# Patient Record
Sex: MALE | Race: WHITE | NOT HISPANIC OR LATINO | Employment: FULL TIME | URBAN - METROPOLITAN AREA
[De-identification: names, ages, dates, MRNs, and addresses within clinical notes are randomized per-mention and may not be internally consistent; named-entity substitution may affect disease eponyms.]

---

## 2017-07-03 RX ORDER — VALSARTAN AND HYDROCHLOROTHIAZIDE 160; 25 MG/1; MG/1
TABLET ORAL
Qty: 90 TABLET | Refills: 2 | Status: SHIPPED | OUTPATIENT
Start: 2017-07-03 | End: 2018-08-11

## 2017-07-26 ENCOUNTER — OFFICE VISIT (OUTPATIENT)
Dept: NEUROSURGERY | Facility: CLINIC | Age: 72
End: 2017-07-26

## 2017-07-26 VITALS
HEART RATE: 94 BPM | HEIGHT: 71 IN | DIASTOLIC BLOOD PRESSURE: 106 MMHG | SYSTOLIC BLOOD PRESSURE: 152 MMHG | WEIGHT: 222.5 LBS | BODY MASS INDEX: 31.15 KG/M2

## 2017-07-26 DIAGNOSIS — R42 DIZZINESS: Primary | ICD-10-CM

## 2017-07-26 DIAGNOSIS — G56.80 PHRENIC NERVE PALSY: ICD-10-CM

## 2017-07-26 PROCEDURE — 99202 OFFICE O/P NEW SF 15 MIN: CPT | Performed by: NEUROLOGICAL SURGERY

## 2017-07-26 NOTE — PROGRESS NOTES
Subjective   Patient ID: Harris Christian is a 71 y.o. male is here today as a self referral for left eye drooping, dizziness and fatigue.    Neurologic Problem   The patient's primary symptoms include focal weakness. This is a new problem. The current episode started more than 1 month ago. The neurological problem developed gradually. The problem has been gradually improving since onset. There was facial and left-sided focality noted. Associated symptoms include dizziness and fatigue. Pertinent negatives include no abdominal pain, auditory change, back pain, bladder incontinence, chest pain, confusion, headaches, light-headedness, neck pain, palpitations, shortness of breath, vertigo or vomiting. Past treatments include nothing.     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  At that time he was found to have an elevated left hemidiaphragm.  The CT scan done at that time reported that the diaphragm was fairly thin and that this had been going on for a long time.  Over the past month or so he has noticed some dizziness and fatigue as well as a little drooping of his left eye.  He has no changes in his vision, no neck pain, no trouble with his arm movement or function, and no pain down his arms.  He has no other symptoms.    The following portions of the patient's history were reviewed and updated as appropriate: allergies, current medications, past family history, past medical history, past social history, past surgical history and problem list.    Review of Systems   Constitutional: Positive for fatigue.   Eyes: Positive for pain ( Drooping left eye).   Respiratory: Negative for chest tightness and shortness of breath.    Cardiovascular: Negative for chest pain and palpitations.   Gastrointestinal: Negative for abdominal pain and vomiting.   Genitourinary: Negative for bladder incontinence.   Musculoskeletal: Negative for back pain and neck pain.   Neurological: Positive for dizziness  and focal weakness. Negative for vertigo, light-headedness and headaches.   Psychiatric/Behavioral: Negative for confusion.   All other systems reviewed and are negative.      Objective   Physical Exam   Constitutional: He is oriented to person, place, and time. He appears well-developed and well-nourished.   HENT:   Head: Normocephalic and atraumatic.   Eyes: Conjunctivae and EOM are normal. Pupils are equal, round, and reactive to light.   Fundoscopic exam:       The right eye shows no papilledema. The right eye shows venous pulsations.        The left eye shows no papilledema. The left eye shows venous pulsations.   Neck: Carotid bruit is not present.   Neurological: He is oriented to person, place, and time. He has a normal Finger-Nose-Finger Test and a normal Heel to Shin Test. Gait normal.   Reflex Scores:       Tricep reflexes are 2+ on the right side and 2+ on the left side.       Bicep reflexes are 2+ on the right side and 2+ on the left side.       Brachioradialis reflexes are 2+ on the right side and 2+ on the left side.       Patellar reflexes are 2+ on the right side and 2+ on the left side.       Achilles reflexes are 2+ on the right side and 2+ on the left side.  Psychiatric: His speech is normal.     Neurologic Exam     Mental Status   Oriented to person, place, and time.   Registration of memory: Good recent and remote memory.   Attention: normal. Concentration: normal.   Speech: speech is normal   Level of consciousness: alert  Knowledge: consistent with education.     Cranial Nerves     CN II   Visual fields full to confrontation.   Visual acuity: normal    CN III, IV, VI   Pupils are equal, round, and reactive to light.  Extraocular motions are normal.     CN V   Facial sensation intact.   Right corneal reflex: normal  Left corneal reflex: normal    CN VII   Facial expression full, symmetric.   Right facial weakness: none  Left facial weakness: none (There is a little drooping of the left eyelid  but no other facial weakness.)    CN VIII   Hearing: intact    CN IX, X   Palate: symmetric    CN XI   Right sternocleidomastoid strength: normal  Left sternocleidomastoid strength: normal    CN XII   Tongue: not atrophic  Tongue deviation: none    Motor Exam   Muscle bulk: normal  Right arm tone: normal  Left arm tone: normal  Right leg tone: normal  Left leg tone: normal    Strength   Strength 5/5 except as noted.     Sensory Exam   Light touch normal.     Gait, Coordination, and Reflexes     Gait  Gait: normal    Coordination   Finger to nose coordination: normal  Heel to shin coordination: normal    Reflexes   Right brachioradialis: 2+  Left brachioradialis: 2+  Right biceps: 2+  Left biceps: 2+  Right triceps: 2+  Left triceps: 2+  Right patellar: 2+  Left patellar: 2+  Right achilles: 2+  Left achilles: 2+  Right : 2+  Left : 2+      Assessment/Plan   Independent Review of Radiographic Studies:      I read the report of the CT scan of his chest which is discussed above.    Medical Decision Making:      I think to be safe we should check an MRI of his brain.  If that looks okay then we might consider either an MRI or a CT of the soft tissues of his neck.  I don't see evidence of a Darby's syndrome.  I don't think we need any contrast for the brain MRI.    Harris was seen today for eye problem.    Diagnoses and all orders for this visit:    Dizziness  -     MRI Brain Without Contrast; Future    Return for Call with results.               mri

## 2017-07-27 PROBLEM — G56.80 PHRENIC NERVE PALSY: Status: ACTIVE | Noted: 2017-07-27

## 2017-07-28 ENCOUNTER — TELEPHONE (OUTPATIENT)
Dept: NEUROSURGERY | Facility: CLINIC | Age: 72
End: 2017-07-28

## 2017-07-28 NOTE — TELEPHONE ENCOUNTER
I talked to this patient today on the phone.  I reviewed his MRI of the cervical soft tissues and his MRI of the brain.  The MRI of the brain shows some tortuosity in the vessels of the brain which I think could account for some mild ptosis.  In addition the MRI of the neck shows a large bone spur on the left side at C3 4 compressing both the C3 and the C4 nerves.  Consequently I think that explains the phrenic nerve paresis.  Because the phrenic nerve paresis has been so long-standing I think it is very unlikely that any surgery would help improve that but I did tell him that I would discuss the films with some other neurosurgeons to see if I get a different opinion.  Otherwise I think he should still keep his appointment with a neurologist just to be sure he doesn't need a spinal tap but I really think that we have a good explanation for his clinical symptoms based on the MRI reports.  I told him to call me if anything else happens.

## 2017-08-01 ENCOUNTER — OFFICE VISIT (OUTPATIENT)
Dept: NEUROLOGY | Facility: CLINIC | Age: 72
End: 2017-08-01

## 2017-08-01 VITALS
SYSTOLIC BLOOD PRESSURE: 148 MMHG | WEIGHT: 231 LBS | HEIGHT: 71 IN | OXYGEN SATURATION: 93 % | HEART RATE: 84 BPM | BODY MASS INDEX: 32.34 KG/M2 | DIASTOLIC BLOOD PRESSURE: 104 MMHG

## 2017-08-01 DIAGNOSIS — H02.402 PTOSIS, LEFT: Primary | ICD-10-CM

## 2017-08-01 PROCEDURE — 99243 OFF/OP CNSLTJ NEW/EST LOW 30: CPT | Performed by: PSYCHIATRY & NEUROLOGY

## 2017-08-01 NOTE — PROGRESS NOTES
Answers for HPI/ROS submitted by the patient on 8/1/2017   Neurologic complaint  altered mental status: No  clumsiness: No  focal sensory loss: No  focal weakness: No  loss of balance: No  memory loss: No  near-syncope: No  slurred speech: No  visual change: No  Chronicity: new  Onset: more than 1 month ago  Onset quality: insidiously  Progression since onset: unchanged  Focality: facial, left-sided  auditory change: No  aura: No  bladder incontinence: No  bowel incontinence: No  vertigo: No  Treatments tried: neck support, position change  Improvement on treatment: moderate      CC: Left ptosis    HPI:  Harris Christian is a  71 y.o.  right-handed white male who was sent for neurologic consultation by Dr. Joshua and Dr. Christian regarding left ptosis.  The patient states that his physician wife had mentioned to him some drooping of the left eyelid.  This has been about one or 2 months ago.  She indicates that it seems to get worse later in the day.  He does not notice it and it does not occlude his vision.  He also states that he has no double vision.  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  This was treated with cervical traction effectively.  He had indicated the right hand was going numb and with cervical traction it stopped going numb.    He does fatigue some gets short of breath some but he is not concerned that it's out of the ordinary.  He denies any speech or swallowing difficulties.  He denies headaches and denies any eye pain.    About a year ago he had thyroid functions which were normal.  He also has had a chest CT which there was no mention of any abnormality in the anterior mediastinum.  MRI of the brain was obtained recently showing some microvascular changes.  There was comment regarding the tip of the basilar and posterior cerebral arteries perhaps may produce some mass effect on the  third nerve.  No tumor and no aneurysm was visible.  Films reviewed.    He does indicate that his hands and feet may go to sleep but that's rare.    There is previous history of a near concussion playing football in high school 1 or 2 times.  No other significant head injuries.  No spine injuries.  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   • Hypertension    • Lower extremity edema    • 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   • Vitamin B 12 deficiency          Past Surgical History:   Procedure Laterality Date   • CARDIAC CATHETERIZATION N/A 6/3/2016    Procedure: Coronary angiography;  Surgeon: Александр Man MD;  Location:  [facility];  Service:    • CARDIAC CATHETERIZATION N/A 6/3/2016    Procedure: Right and Left Heart Cath;  Surgeon: Александр Man MD;  Location:  [facility];  Service:    • CARDIAC CATHETERIZATION N/A 6/3/2016    Procedure: Left ventriculography;  Surgeon: Александр Man MD;  Location:  [facility];  Service:    • NOSE SURGERY             Current Outpatient Prescriptions:   •  aspirin 81 MG EC tablet, Take 81 mg by mouth daily., Disp: , Rfl:   •  Cyanocobalamin 1000 MCG/ML kit, Inject 1,000 mcg as directed every 3 (three) months., Disp: , Rfl:   •  mometasone-formoterol (DULERA 100) 100-5 MCG/ACT inhaler, Inhale 2 puffs 2 (two) times a day., Disp: , Rfl:   •  valsartan (DIOVAN) 160 MG tablet, Take 160 mg by mouth every night., Disp: , Rfl:   •  valsartan-hydrochlorothiazide (DIOVAN-HCT) 160-25 MG per tablet, TAKE 1 TABLET BY MOUTH DAILY, Disp: 90 tablet, Rfl: 2      Family History   Problem Relation Age of Onset   • Heart attack Father    • Cancer Father     • Alcohol abuse Father    • Dementia Father          Social History     Social History   • Marital status:      Spouse name: N/A   • Number of children: N/A   • Years of education: N/A     Occupational History   • Not on file.     Social History Main Topics   • Smoking status: Never Smoker   • Smokeless tobacco: Never Used   • Alcohol use Yes     2 Standard drinks or equivalent per week   • Drug use: No   • Sexual activity: Yes     Partners: Female     Other Topics Concern   • Not on file     Social History Narrative         No Known Allergies      Pain Scale:0/10        ROS:  Review of Systems   Constitutional: Positive for fatigue. Negative for activity change, appetite change, diaphoresis and fever.   HENT: Negative for ear pain, facial swelling and hearing loss.    Eyes: Negative for pain, redness and itching.   Respiratory: Negative for cough, choking and shortness of breath.    Cardiovascular: Negative for chest pain, palpitations and leg swelling.   Gastrointestinal: Negative for abdominal pain, nausea and vomiting.   Endocrine: Negative for cold intolerance and heat intolerance.   Musculoskeletal: Negative for arthralgias, back pain, joint swelling and neck pain.   Skin: Negative for color change, pallor, rash and wound.   Allergic/Immunologic: Negative for environmental allergies and food allergies.   Neurological: Positive for dizziness. Negative for tremors, seizures, syncope, facial asymmetry, speech difficulty, weakness, light-headedness, numbness and headaches.   Hematological: Negative for adenopathy. Does not bruise/bleed easily.   Psychiatric/Behavioral: Negative for agitation, behavioral problems, confusion, decreased concentration, dysphoric mood, hallucinations, self-injury, sleep disturbance and suicidal ideas. The patient is not nervous/anxious and is not hyperactive.            Physical Exam:  Vitals:    08/01/17 1452   BP: (!) 148/104   Pulse: 84   SpO2: 93%   Weight: 231 lb (105  "kg)   Height: 71\" (180.3 cm)     Body mass index is 32.22 kg/(m^2).    Physical Exam  Gen.: Overweight white male no acute distress  HEENT: Normocephalic no evidence of trauma.  Discs flat.  Throat negative.,  Ears: TMs intact  Neck: Supple.  No thyromegaly.  No cervical bruits.  Radial pulses strong and simultaneous.  Heart: Regular rate and rhythm without murmurs         Neurological Exam:   Mental status: Awake alert oriented conversant provides a good history without evidence of an affective disorder thought disorder delusions or hallucinations.  H CF: No aphasia or apraxia or dysarthria.  Recent and remote memory intact.  Knowledge of recent events intact.  Cranial nerves: Visual fields full no left inattention.  Eye movements full no nystagmus.  There is mild left ptosis.  Pupils equal and reactive including in dim light.  Light touch and pinprick normal.  Face symmetric.  Hearing was intact.  Soft palate elevates equally.  Sternomastoid and trapezius are strong.  Tongue midline.  Motor: Normal tone and bulk with full power in all muscles tested in the arms and legs.  Reflexes: Symmetric with downgoing toe signs.  Cerebellar: Finger to nose shows minimal postural tremor.  Rapid movements heel-to-shin normal  Gait and Station: Casual walk toe walk heel walk and tandem walk appear normal for age        Results:      Lab Results   Component Value Date    GLUCOSE 91 05/16/2016    BUN 25 06/15/2016    CREATININE 1.03 06/15/2016    EGFRIFNONA 74 06/15/2016    EGFRIFAFRI 85 06/15/2016    BCR 24 (H) 06/15/2016    CO2 25 06/15/2016    CALCIUM 9.6 06/15/2016    ALBUMIN 4.00 05/16/2016    LABIL2 1.1 05/16/2016    AST 37 05/16/2016    ALT 29 05/16/2016       Lab Results   Component Value Date    WBC 7.08 05/16/2016    HGB 14.6 06/03/2016    HCT 43 06/03/2016    MCV 94.6 05/16/2016     05/16/2016         .No results found for: RPR      Lab Results   Component Value Date    TSH 1.360 05/16/2016    Y6APBEN 7.05 " 05/16/2016         No results found for: UPKOMWEE22      No results found for: FOLATE      No results found for: HGBA1C    MRI brain reviewed as noted above.  MRI of the neck was complex.  There is spurring at C3/4 with left-sided lateral recess stenosis plus similar findings on the left at T1/2        Assessment:   1.  Left ptosis-strongest suspicion is connective tissue breakdown in the lid.  Myasthenia gravis to be ruled out.  I am not impressed with a structural CNS lesion is present to explain it and he does not have a Darby syndrome.  2.  Cervical degenerative disc disease as noted above          Plan:  1.  Myasthenia gravis panel (acetylcholine receptor antibody panel 3 antibodies including blocking, modulating and binding antibodies.  2.  Call for results.  Return when necessary                          Dictated utilizing Dragon dictation.

## 2017-08-03 ENCOUNTER — LAB (OUTPATIENT)
Dept: LAB | Facility: HOSPITAL | Age: 72
End: 2017-08-03

## 2017-08-03 DIAGNOSIS — H02.402 PTOSIS, LEFT: ICD-10-CM

## 2017-08-03 PROCEDURE — 83519 RIA NONANTIBODY: CPT

## 2017-08-03 PROCEDURE — 36415 COLL VENOUS BLD VENIPUNCTURE: CPT

## 2017-08-15 LAB
ACHR AB SER-SCNC: <0.03 NMOL/L (ref 0–0.24)
ACHR BLOCK AB/ACHR TOTAL SFR SER: 20 % (ref 0–25)
ACHR MOD AB/ACHR TOTAL SFR SER: <12 % (ref 0–20)

## 2017-08-30 ENCOUNTER — TELEPHONE (OUTPATIENT)
Dept: NEUROLOGY | Facility: CLINIC | Age: 72
End: 2017-08-30

## 2017-08-30 NOTE — TELEPHONE ENCOUNTER
----- Message from Son Reese MD sent at 8/23/2017  7:24 PM EDT -----  Acetylcholine receptor antibodies were negative for myasthenia gravis.    Jose, please let him know  Gns

## 2018-02-19 ENCOUNTER — DOCUMENTATION (OUTPATIENT)
Dept: SURGERY | Facility: CLINIC | Age: 73
End: 2018-02-19

## 2018-04-03 RX ORDER — VALSARTAN AND HYDROCHLOROTHIAZIDE 160; 25 MG/1; MG/1
TABLET ORAL
Qty: 90 TABLET | Refills: 1 | OUTPATIENT
Start: 2018-04-03

## 2018-04-05 RX ORDER — VALSARTAN AND HYDROCHLOROTHIAZIDE 160; 25 MG/1; MG/1
TABLET ORAL
Qty: 90 TABLET | Refills: 1 | OUTPATIENT
Start: 2018-04-05

## 2018-08-11 RX ORDER — LOSARTAN POTASSIUM 50 MG/1
TABLET ORAL
Qty: 90 TABLET | Refills: 3 | Status: SHIPPED | OUTPATIENT
Start: 2018-08-11 | End: 2019-07-30 | Stop reason: SDUPTHER

## 2018-08-11 RX ORDER — LOSARTAN POTASSIUM AND HYDROCHLOROTHIAZIDE 12.5; 5 MG/1; MG/1
TABLET ORAL
Qty: 90 TABLET | Refills: 3 | Status: SHIPPED | OUTPATIENT
Start: 2018-08-11 | End: 2019-07-30 | Stop reason: SDUPTHER

## 2019-06-01 ENCOUNTER — DOCUMENTATION (OUTPATIENT)
Dept: SURGERY | Facility: CLINIC | Age: 74
End: 2019-06-01

## 2019-06-01 RX ORDER — SULFAMETHOXAZOLE AND TRIMETHOPRIM 800; 160 MG/1; MG/1
1 TABLET ORAL 2 TIMES DAILY
Qty: 30 TABLET | Refills: 0 | Status: SHIPPED | OUTPATIENT
Start: 2019-06-01 | End: 2020-03-11

## 2019-07-26 RX ORDER — LOSARTAN POTASSIUM 50 MG/1
TABLET ORAL
Qty: 90 TABLET | Refills: 2 | OUTPATIENT
Start: 2019-07-26

## 2019-07-26 RX ORDER — LOSARTAN POTASSIUM AND HYDROCHLOROTHIAZIDE 12.5; 5 MG/1; MG/1
TABLET ORAL
Qty: 90 TABLET | Refills: 2 | OUTPATIENT
Start: 2019-07-26

## 2019-07-30 RX ORDER — LOSARTAN POTASSIUM 50 MG/1
TABLET ORAL
Qty: 90 TABLET | Refills: 3 | Status: SHIPPED | OUTPATIENT
Start: 2019-07-30 | End: 2020-01-01

## 2019-07-30 RX ORDER — LOSARTAN POTASSIUM AND HYDROCHLOROTHIAZIDE 12.5; 5 MG/1; MG/1
TABLET ORAL
Qty: 90 TABLET | Refills: 3 | Status: SHIPPED | OUTPATIENT
Start: 2019-07-30 | End: 2020-01-01

## 2020-01-01 ENCOUNTER — TRANSCRIBE ORDERS (OUTPATIENT)
Dept: ADMINISTRATIVE | Facility: HOSPITAL | Age: 75
End: 2020-01-01

## 2020-01-01 ENCOUNTER — HOSPITAL ENCOUNTER (OUTPATIENT)
Dept: MRI IMAGING | Facility: HOSPITAL | Age: 75
Discharge: HOME | End: 2020-07-02
Admitting: SURGERY

## 2020-01-01 ENCOUNTER — TRANSCRIBE ORDERS (OUTPATIENT)
Dept: SLEEP MEDICINE | Facility: HOSPITAL | Age: 75
End: 2020-01-01

## 2020-01-01 ENCOUNTER — LAB (OUTPATIENT)
Dept: OTHER | Facility: HOSPITAL | Age: 75
End: 2020-01-01

## 2020-01-01 ENCOUNTER — HOSPITAL ENCOUNTER (OUTPATIENT)
Dept: PET IMAGING | Facility: HOSPITAL | Age: 75
Discharge: HOME | End: 2020-07-16

## 2020-01-01 ENCOUNTER — OFFICE VISIT (OUTPATIENT)
Dept: ONCOLOGY | Facility: CLINIC | Age: 75
End: 2020-01-01

## 2020-01-01 ENCOUNTER — OFFICE VISIT (OUTPATIENT)
Dept: CARDIOLOGY | Facility: CLINIC | Age: 75
End: 2020-01-01

## 2020-01-01 ENCOUNTER — DOCUMENTATION (OUTPATIENT)
Dept: SURGERY | Facility: CLINIC | Age: 75
End: 2020-01-01

## 2020-01-01 ENCOUNTER — HOSPITAL ENCOUNTER (OUTPATIENT)
Dept: MRI IMAGING | Facility: HOSPITAL | Age: 75
Discharge: HOME | End: 2020-10-15
Admitting: RADIOLOGY

## 2020-01-01 ENCOUNTER — TELEPHONE (OUTPATIENT)
Dept: ONCOLOGY | Facility: CLINIC | Age: 75
End: 2020-01-01

## 2020-01-01 ENCOUNTER — LAB (OUTPATIENT)
Dept: LAB | Facility: HOSPITAL | Age: 75
End: 2020-01-01

## 2020-01-01 ENCOUNTER — DOCUMENTATION (OUTPATIENT)
Dept: ONCOLOGY | Facility: CLINIC | Age: 75
End: 2020-01-01

## 2020-01-01 ENCOUNTER — APPOINTMENT (OUTPATIENT)
Dept: LAB | Facility: HOSPITAL | Age: 75
End: 2020-01-01

## 2020-01-01 ENCOUNTER — PREP FOR SURGERY (OUTPATIENT)
Dept: OTHER | Facility: HOSPITAL | Age: 75
End: 2020-01-01

## 2020-01-01 ENCOUNTER — OFFICE VISIT (OUTPATIENT)
Dept: GASTROENTEROLOGY | Facility: CLINIC | Age: 75
End: 2020-01-01

## 2020-01-01 ENCOUNTER — HOSPITAL ENCOUNTER (OUTPATIENT)
Dept: PET IMAGING | Facility: HOSPITAL | Age: 75
Discharge: HOME | End: 2020-07-16
Admitting: INTERNAL MEDICINE

## 2020-01-01 ENCOUNTER — HOSPITAL ENCOUNTER (OUTPATIENT)
Dept: CT IMAGING | Facility: HOSPITAL | Age: 75
Discharge: HOME | End: 2020-06-26
Admitting: SURGERY

## 2020-01-01 ENCOUNTER — CONSULT (OUTPATIENT)
Dept: ONCOLOGY | Facility: CLINIC | Age: 75
End: 2020-01-01

## 2020-01-01 ENCOUNTER — HOSPITAL ENCOUNTER (OUTPATIENT)
Dept: RESPIRATORY THERAPY | Facility: HOSPITAL | Age: 75
Discharge: HOME | End: 2020-07-13
Admitting: INTERNAL MEDICINE

## 2020-01-01 ENCOUNTER — APPOINTMENT (OUTPATIENT)
Dept: OTHER | Facility: HOSPITAL | Age: 75
End: 2020-01-01

## 2020-01-01 VITALS
BODY MASS INDEX: 28.27 KG/M2 | TEMPERATURE: 97.3 F | HEART RATE: 100 BPM | RESPIRATION RATE: 16 BRPM | DIASTOLIC BLOOD PRESSURE: 82 MMHG | SYSTOLIC BLOOD PRESSURE: 128 MMHG | HEIGHT: 71 IN | WEIGHT: 201.9 LBS | OXYGEN SATURATION: 96 %

## 2020-01-01 VITALS
SYSTOLIC BLOOD PRESSURE: 100 MMHG | BODY MASS INDEX: 28.98 KG/M2 | WEIGHT: 207 LBS | DIASTOLIC BLOOD PRESSURE: 60 MMHG | HEART RATE: 82 BPM | HEIGHT: 71 IN

## 2020-01-01 VITALS
DIASTOLIC BLOOD PRESSURE: 102 MMHG | SYSTOLIC BLOOD PRESSURE: 162 MMHG | WEIGHT: 218.6 LBS | HEIGHT: 71 IN | BODY MASS INDEX: 30.6 KG/M2 | HEART RATE: 91 BPM | OXYGEN SATURATION: 98 %

## 2020-01-01 VITALS
DIASTOLIC BLOOD PRESSURE: 78 MMHG | SYSTOLIC BLOOD PRESSURE: 116 MMHG | WEIGHT: 211.2 LBS | BODY MASS INDEX: 29.57 KG/M2 | OXYGEN SATURATION: 97 % | RESPIRATION RATE: 16 BRPM | HEIGHT: 71 IN | HEART RATE: 85 BPM | TEMPERATURE: 97.4 F

## 2020-01-01 VITALS
HEART RATE: 79 BPM | OXYGEN SATURATION: 97 % | SYSTOLIC BLOOD PRESSURE: 119 MMHG | TEMPERATURE: 97.1 F | HEIGHT: 71 IN | DIASTOLIC BLOOD PRESSURE: 71 MMHG | WEIGHT: 206.4 LBS | BODY MASS INDEX: 28.9 KG/M2 | RESPIRATION RATE: 16 BRPM

## 2020-01-01 DIAGNOSIS — R60.0 BILATERAL LEG EDEMA: ICD-10-CM

## 2020-01-01 DIAGNOSIS — Z01.818 OTHER SPECIFIED PRE-OPERATIVE EXAMINATION: ICD-10-CM

## 2020-01-01 DIAGNOSIS — C22.9 MALIGNANT NEOPLASM OF LIVER, UNSPECIFIED LIVER MALIGNANCY TYPE (HCC): ICD-10-CM

## 2020-01-01 DIAGNOSIS — D51.0 PERNICIOUS ANEMIA: Primary | ICD-10-CM

## 2020-01-01 DIAGNOSIS — C22.9 MALIGNANT NEOPLASM OF LIVER, UNSPECIFIED LIVER MALIGNANCY TYPE (HCC): Primary | ICD-10-CM

## 2020-01-01 DIAGNOSIS — D51.0 PERNICIOUS ANEMIA: ICD-10-CM

## 2020-01-01 DIAGNOSIS — I10 ESSENTIAL HYPERTENSION: Primary | ICD-10-CM

## 2020-01-01 DIAGNOSIS — C22.0 HEPATOMA (HCC): ICD-10-CM

## 2020-01-01 DIAGNOSIS — C22.0 HEPATOCELLULAR CARCINOMA (HCC): ICD-10-CM

## 2020-01-01 DIAGNOSIS — R93.2 ABNORMAL CT OF LIVER: Primary | ICD-10-CM

## 2020-01-01 DIAGNOSIS — Z01.818 OTHER SPECIFIED PRE-OPERATIVE EXAMINATION: Primary | ICD-10-CM

## 2020-01-01 DIAGNOSIS — C22.0 HEPATOCELLULAR CARCINOMA (HCC): Primary | ICD-10-CM

## 2020-01-01 DIAGNOSIS — K74.60 CIRRHOSIS OF LIVER WITHOUT ASCITES, UNSPECIFIED HEPATIC CIRRHOSIS TYPE (HCC): ICD-10-CM

## 2020-01-01 DIAGNOSIS — R93.2 ABNORMAL CT OF LIVER: ICD-10-CM

## 2020-01-01 DIAGNOSIS — K74.60 HEPATIC CIRRHOSIS, UNSPECIFIED HEPATIC CIRRHOSIS TYPE, UNSPECIFIED WHETHER ASCITES PRESENT (HCC): ICD-10-CM

## 2020-01-01 DIAGNOSIS — C22.0 HEPATOMA (HCC): Primary | ICD-10-CM

## 2020-01-01 DIAGNOSIS — J98.6 ELEVATED HEMIDIAPHRAGM: ICD-10-CM

## 2020-01-01 DIAGNOSIS — R16.0 LIVER MASS, RIGHT LOBE: ICD-10-CM

## 2020-01-01 DIAGNOSIS — C22.7 OTHER SPECIFIED CARCINOMAS OF LIVER (HCC): Primary | ICD-10-CM

## 2020-01-01 LAB
ALBUMIN SERPL-MCNC: 3.3 G/DL (ref 3.5–5.2)
ALBUMIN SERPL-MCNC: 3.5 G/DL (ref 3.5–5.2)
ALBUMIN/GLOB SERPL: 1 G/DL
ALBUMIN/GLOB SERPL: 1.1 G/DL (ref 1.1–2.4)
ALP SERPL-CCNC: 163 U/L (ref 38–116)
ALP SERPL-CCNC: 264 U/L (ref 39–117)
ALPHA-FETOPROTEIN: 4.14 NG/ML (ref 0–8.3)
ALPHA-FETOPROTEIN: 4.24 NG/ML (ref 0–8.3)
ALPHA-FETOPROTEIN: 4.83 NG/ML (ref 0–8.3)
ALT SERPL W P-5'-P-CCNC: 41 U/L (ref 1–41)
ALT SERPL W P-5'-P-CCNC: 46 U/L (ref 0–41)
ANION GAP SERPL CALCULATED.3IONS-SCNC: 10.2 MMOL/L (ref 5–15)
ANION GAP SERPL CALCULATED.3IONS-SCNC: 10.9 MMOL/L (ref 5–15)
APTT PPP: 35.6 SECONDS (ref 22.7–35.4)
AST SERPL-CCNC: 75 U/L (ref 0–40)
AST SERPL-CCNC: 76 U/L (ref 1–40)
BASOPHILS # BLD AUTO: 0.02 10*3/MM3 (ref 0–0.2)
BASOPHILS # BLD AUTO: 0.03 10*3/MM3 (ref 0–0.2)
BASOPHILS # BLD AUTO: 0.03 10*3/MM3 (ref 0–0.2)
BASOPHILS NFR BLD AUTO: 0.5 % (ref 0–1.5)
BASOPHILS NFR BLD AUTO: 0.7 % (ref 0–1.5)
BASOPHILS NFR BLD AUTO: 0.7 % (ref 0–1.5)
BDY SITE: NORMAL
BILIRUB SERPL-MCNC: 1.5 MG/DL (ref 0–1.2)
BILIRUB SERPL-MCNC: 1.6 MG/DL (ref 0.2–1.2)
BUN SERPL-MCNC: 17 MG/DL (ref 6–20)
BUN SERPL-MCNC: 19 MG/DL (ref 8–23)
BUN/CREAT SERPL: 19.8 (ref 7.3–30)
BUN/CREAT SERPL: 23.5 (ref 7–25)
CALCIUM SPEC-SCNC: 9.7 MG/DL (ref 8.6–10.5)
CALCIUM SPEC-SCNC: 9.8 MG/DL (ref 8.5–10.2)
CHLORIDE SERPL-SCNC: 101 MMOL/L (ref 98–107)
CHLORIDE SERPL-SCNC: 103 MMOL/L (ref 98–107)
CO2 SERPL-SCNC: 26.1 MMOL/L (ref 22–29)
CO2 SERPL-SCNC: 26.8 MMOL/L (ref 22–29)
CREAT BLDA-MCNC: 0.8 MG/DL (ref 0.6–1.3)
CREAT SERPL-MCNC: 0.81 MG/DL (ref 0.76–1.27)
CREAT SERPL-MCNC: 0.86 MG/DL (ref 0.7–1.3)
DEPRECATED RDW RBC AUTO: 52.9 FL (ref 37–54)
DEPRECATED RDW RBC AUTO: 53.6 FL (ref 37–54)
DEPRECATED RDW RBC AUTO: 55.8 FL (ref 37–54)
EOSINOPHIL # BLD AUTO: 0.06 10*3/MM3 (ref 0–0.4)
EOSINOPHIL # BLD AUTO: 0.08 10*3/MM3 (ref 0–0.4)
EOSINOPHIL # BLD AUTO: 0.12 10*3/MM3 (ref 0–0.4)
EOSINOPHIL NFR BLD AUTO: 1.3 % (ref 0.3–6.2)
EOSINOPHIL NFR BLD AUTO: 1.8 % (ref 0.3–6.2)
EOSINOPHIL NFR BLD AUTO: 2.8 % (ref 0.3–6.2)
ERYTHROCYTE [DISTWIDTH] IN BLOOD BY AUTOMATED COUNT: 13.9 % (ref 12.3–15.4)
ERYTHROCYTE [DISTWIDTH] IN BLOOD BY AUTOMATED COUNT: 14.3 % (ref 12.3–15.4)
ERYTHROCYTE [DISTWIDTH] IN BLOOD BY AUTOMATED COUNT: 14.6 % (ref 12.3–15.4)
FIBRINOGEN PPP-MCNC: 221 MG/DL (ref 219–464)
GFR SERPL CREATININE-BSD FRML MDRD: 87 ML/MIN/1.73
GFR SERPL CREATININE-BSD FRML MDRD: 93 ML/MIN/1.73
GLOBULIN UR ELPH-MCNC: 3.2 GM/DL (ref 1.8–3.5)
GLOBULIN UR ELPH-MCNC: 3.3 GM/DL
GLUCOSE BLDC GLUCOMTR-MCNC: 88 MG/DL (ref 70–130)
GLUCOSE SERPL-MCNC: 126 MG/DL (ref 74–124)
GLUCOSE SERPL-MCNC: 88 MG/DL (ref 65–99)
HCT VFR BLD AUTO: 37.1 % (ref 37.5–51)
HCT VFR BLD AUTO: 41.1 % (ref 37.5–51)
HCT VFR BLD AUTO: 42.1 % (ref 37.5–51)
HGB BLD-MCNC: 12.5 G/DL (ref 13–17.7)
HGB BLD-MCNC: 13.8 G/DL (ref 13–17.7)
HGB BLD-MCNC: 13.9 G/DL (ref 13–17.7)
HGB BLDA-MCNC: 14 G/DL (ref 12–18)
IMM GRANULOCYTES # BLD AUTO: 0.01 10*3/MM3 (ref 0–0.05)
IMM GRANULOCYTES # BLD AUTO: 0.01 10*3/MM3 (ref 0–0.05)
IMM GRANULOCYTES # BLD AUTO: 0.02 10*3/MM3 (ref 0–0.05)
IMM GRANULOCYTES NFR BLD AUTO: 0.2 % (ref 0–0.5)
IMM GRANULOCYTES NFR BLD AUTO: 0.2 % (ref 0–0.5)
IMM GRANULOCYTES NFR BLD AUTO: 0.5 % (ref 0–0.5)
INR PPP: 1.09 (ref 0.9–1.1)
LYMPHOCYTES # BLD AUTO: 1.18 10*3/MM3 (ref 0.7–3.1)
LYMPHOCYTES # BLD AUTO: 1.42 10*3/MM3 (ref 0.7–3.1)
LYMPHOCYTES # BLD AUTO: 1.68 10*3/MM3 (ref 0.7–3.1)
LYMPHOCYTES NFR BLD AUTO: 26.9 % (ref 19.6–45.3)
LYMPHOCYTES NFR BLD AUTO: 33.3 % (ref 19.6–45.3)
LYMPHOCYTES NFR BLD AUTO: 37.8 % (ref 19.6–45.3)
MACROCYTES BLD QL SMEAR: NORMAL
MCH RBC QN AUTO: 33.7 PG (ref 26.6–33)
MCH RBC QN AUTO: 33.9 PG (ref 26.6–33)
MCH RBC QN AUTO: 34.9 PG (ref 26.6–33)
MCHC RBC AUTO-ENTMCNC: 33 G/DL (ref 31.5–35.7)
MCHC RBC AUTO-ENTMCNC: 33.6 G/DL (ref 31.5–35.7)
MCHC RBC AUTO-ENTMCNC: 33.7 G/DL (ref 31.5–35.7)
MCV RBC AUTO: 100.5 FL (ref 79–97)
MCV RBC AUTO: 102.7 FL (ref 79–97)
MCV RBC AUTO: 103.6 FL (ref 79–97)
MONOCYTES # BLD AUTO: 0.66 10*3/MM3 (ref 0.1–0.9)
MONOCYTES # BLD AUTO: 0.75 10*3/MM3 (ref 0.1–0.9)
MONOCYTES # BLD AUTO: 0.79 10*3/MM3 (ref 0.1–0.9)
MONOCYTES NFR BLD AUTO: 14.8 % (ref 5–12)
MONOCYTES NFR BLD AUTO: 17.6 % (ref 5–12)
MONOCYTES NFR BLD AUTO: 18 % (ref 5–12)
NEUTROPHILS NFR BLD AUTO: 1.93 10*3/MM3 (ref 1.7–7)
NEUTROPHILS NFR BLD AUTO: 2.01 10*3/MM3 (ref 1.7–7)
NEUTROPHILS NFR BLD AUTO: 2.29 10*3/MM3 (ref 1.7–7)
NEUTROPHILS NFR BLD AUTO: 45.2 % (ref 42.7–76)
NEUTROPHILS NFR BLD AUTO: 45.4 % (ref 42.7–76)
NEUTROPHILS NFR BLD AUTO: 52.3 % (ref 42.7–76)
NRBC BLD AUTO-RTO: 0 /100 WBC (ref 0–0.2)
PLAT MORPH BLD: NORMAL
PLATELET # BLD AUTO: 109 10*3/MM3 (ref 140–450)
PLATELET # BLD AUTO: 122 10*3/MM3 (ref 140–450)
PLATELET # BLD AUTO: 126 10*3/MM3 (ref 140–450)
PMV BLD AUTO: 10.2 FL (ref 6–12)
PMV BLD AUTO: 10.5 FL (ref 6–12)
PMV BLD AUTO: 10.6 FL (ref 6–12)
POTASSIUM SERPL-SCNC: 3.9 MMOL/L (ref 3.5–4.7)
POTASSIUM SERPL-SCNC: 4 MMOL/L (ref 3.5–5.2)
PROT SERPL-MCNC: 6.6 G/DL (ref 6–8.5)
PROT SERPL-MCNC: 6.7 G/DL (ref 6.3–8)
PROTHROMBIN TIME: 14 SECONDS (ref 11.7–14.2)
RBC # BLD AUTO: 3.58 10*6/MM3 (ref 4.14–5.8)
RBC # BLD AUTO: 4.09 10*6/MM3 (ref 4.14–5.8)
RBC # BLD AUTO: 4.1 10*6/MM3 (ref 4.14–5.8)
REF LAB TEST METHOD: NORMAL
REF LAB TEST METHOD: NORMAL
SARS-COV-2 RNA RESP QL NAA+PROBE: NOT DETECTED
SODIUM SERPL-SCNC: 138 MMOL/L (ref 136–145)
SODIUM SERPL-SCNC: 140 MMOL/L (ref 134–145)
T3FREE SERPL-MCNC: 3.26 PG/ML (ref 2–4.4)
T4 FREE SERPL-MCNC: 1.07 NG/DL (ref 0.93–1.7)
TSH SERPL DL<=0.05 MIU/L-ACNC: 1.34 UIU/ML (ref 0.27–4.2)
WBC # BLD AUTO: 4.26 10*3/MM3 (ref 3.4–10.8)
WBC # BLD AUTO: 4.38 10*3/MM3 (ref 3.4–10.8)
WBC # BLD AUTO: 4.45 10*3/MM3 (ref 3.4–10.8)
WBC MORPH BLD: NORMAL

## 2020-01-01 PROCEDURE — 36415 COLL VENOUS BLD VENIPUNCTURE: CPT

## 2020-01-01 PROCEDURE — 82105 ALPHA-FETOPROTEIN SERUM: CPT | Performed by: INTERNAL MEDICINE

## 2020-01-01 PROCEDURE — 74183 MRI ABD W/O CNTR FLWD CNTR: CPT

## 2020-01-01 PROCEDURE — 99213 OFFICE O/P EST LOW 20 MIN: CPT | Performed by: INTERNAL MEDICINE

## 2020-01-01 PROCEDURE — 94729 DIFFUSING CAPACITY: CPT

## 2020-01-01 PROCEDURE — U0004 COV-19 TEST NON-CDC HGH THRU: HCPCS

## 2020-01-01 PROCEDURE — A9577 INJ MULTIHANCE: HCPCS | Performed by: RADIOLOGY

## 2020-01-01 PROCEDURE — 84481 FREE ASSAY (FT-3): CPT

## 2020-01-01 PROCEDURE — 93000 ELECTROCARDIOGRAM COMPLETE: CPT | Performed by: INTERNAL MEDICINE

## 2020-01-01 PROCEDURE — 80053 COMPREHEN METABOLIC PANEL: CPT

## 2020-01-01 PROCEDURE — 99442 PR PHYS/QHP TELEPHONE EVALUATION 11-20 MIN: CPT | Performed by: INTERNAL MEDICINE

## 2020-01-01 PROCEDURE — 82565 ASSAY OF CREATININE: CPT

## 2020-01-01 PROCEDURE — 0 FLUDEOXYGLUCOSE F18 SOLUTION: Performed by: INTERNAL MEDICINE

## 2020-01-01 PROCEDURE — 94726 PLETHYSMOGRAPHY LUNG VOLUMES: CPT

## 2020-01-01 PROCEDURE — 99245 OFF/OP CONSLTJ NEW/EST HI 55: CPT | Performed by: INTERNAL MEDICINE

## 2020-01-01 PROCEDURE — 36415 COLL VENOUS BLD VENIPUNCTURE: CPT | Performed by: INTERNAL MEDICINE

## 2020-01-01 PROCEDURE — A9552 F18 FDG: HCPCS | Performed by: INTERNAL MEDICINE

## 2020-01-01 PROCEDURE — 85007 BL SMEAR W/DIFF WBC COUNT: CPT | Performed by: INTERNAL MEDICINE

## 2020-01-01 PROCEDURE — 94010 BREATHING CAPACITY TEST: CPT

## 2020-01-01 PROCEDURE — 82962 GLUCOSE BLOOD TEST: CPT

## 2020-01-01 PROCEDURE — 99214 OFFICE O/P EST MOD 30 MIN: CPT | Performed by: INTERNAL MEDICINE

## 2020-01-01 PROCEDURE — 85025 COMPLETE CBC W/AUTO DIFF WBC: CPT

## 2020-01-01 PROCEDURE — C9803 HOPD COVID-19 SPEC COLLECT: HCPCS

## 2020-01-01 PROCEDURE — 85730 THROMBOPLASTIN TIME PARTIAL: CPT | Performed by: INTERNAL MEDICINE

## 2020-01-01 PROCEDURE — 85384 FIBRINOGEN ACTIVITY: CPT | Performed by: INTERNAL MEDICINE

## 2020-01-01 PROCEDURE — A9577 INJ MULTIHANCE: HCPCS | Performed by: SURGERY

## 2020-01-01 PROCEDURE — 25010000002 IOPAMIDOL 61 % SOLUTION: Performed by: SURGERY

## 2020-01-01 PROCEDURE — 80053 COMPREHEN METABOLIC PANEL: CPT | Performed by: INTERNAL MEDICINE

## 2020-01-01 PROCEDURE — 78815 PET IMAGE W/CT SKULL-THIGH: CPT

## 2020-01-01 PROCEDURE — 84443 ASSAY THYROID STIM HORMONE: CPT

## 2020-01-01 PROCEDURE — 99203 OFFICE O/P NEW LOW 30 MIN: CPT | Performed by: INTERNAL MEDICINE

## 2020-01-01 PROCEDURE — 82820 HEMOGLOBIN-OXYGEN AFFINITY: CPT | Performed by: INTERNAL MEDICINE

## 2020-01-01 PROCEDURE — 0 GADOBENATE DIMEGLUMINE 529 MG/ML SOLUTION: Performed by: RADIOLOGY

## 2020-01-01 PROCEDURE — 85610 PROTHROMBIN TIME: CPT | Performed by: INTERNAL MEDICINE

## 2020-01-01 PROCEDURE — 85025 COMPLETE CBC W/AUTO DIFF WBC: CPT | Performed by: INTERNAL MEDICINE

## 2020-01-01 PROCEDURE — 0 GADOBENATE DIMEGLUMINE 529 MG/ML SOLUTION: Performed by: SURGERY

## 2020-01-01 PROCEDURE — 84439 ASSAY OF FREE THYROXINE: CPT

## 2020-01-01 PROCEDURE — 74178 CT ABD&PLV WO CNTR FLWD CNTR: CPT

## 2020-01-01 RX ORDER — BENAZEPRIL HYDROCHLORIDE 20 MG/1
20 TABLET ORAL 2 TIMES DAILY
Qty: 180 TABLET | Refills: 1 | Status: ON HOLD | OUTPATIENT
Start: 2020-01-01 | End: 2021-01-01

## 2020-01-01 RX ORDER — OXYCODONE HCL 10 MG/1
10 TABLET, FILM COATED, EXTENDED RELEASE ORAL ONCE
Status: CANCELLED | OUTPATIENT
Start: 2020-01-01 | End: 2020-01-01

## 2020-01-01 RX ORDER — SPIRONOLACTONE 25 MG/1
25 TABLET ORAL DAILY
Qty: 30 TABLET | Refills: 1 | Status: SHIPPED | OUTPATIENT
Start: 2020-01-01 | End: 2020-01-01 | Stop reason: SDUPTHER

## 2020-01-01 RX ORDER — CEFAZOLIN SODIUM 2 G/100ML
2 INJECTION, SOLUTION INTRAVENOUS ONCE
Status: CANCELLED | OUTPATIENT
Start: 2020-01-01 | End: 2020-01-01

## 2020-01-01 RX ORDER — SPIRONOLACTONE 25 MG/1
25 TABLET ORAL DAILY
Qty: 90 TABLET | Refills: 1 | Status: SHIPPED | OUTPATIENT
Start: 2020-01-01 | End: 2021-01-01

## 2020-01-01 RX ORDER — DOXYCYCLINE HYCLATE 20 MG
TABLET ORAL
COMMUNITY
Start: 2020-01-01 | End: 2021-01-01

## 2020-01-01 RX ORDER — SODIUM CHLORIDE 0.9 % (FLUSH) 0.9 %
3 SYRINGE (ML) INJECTION EVERY 12 HOURS SCHEDULED
Status: CANCELLED | OUTPATIENT
Start: 2020-01-01

## 2020-01-01 RX ORDER — SODIUM CHLORIDE 0.9 % (FLUSH) 0.9 %
1-10 SYRINGE (ML) INJECTION AS NEEDED
Status: CANCELLED | OUTPATIENT
Start: 2020-01-01

## 2020-01-01 RX ORDER — BENAZEPRIL HYDROCHLORIDE 20 MG/1
20 TABLET ORAL 2 TIMES DAILY
Qty: 60 TABLET | Refills: 1 | Status: SHIPPED | OUTPATIENT
Start: 2020-01-01 | End: 2020-01-01 | Stop reason: SDUPTHER

## 2020-01-01 RX ADMIN — GADOBENATE DIMEGLUMINE 20 ML: 529 INJECTION, SOLUTION INTRAVENOUS at 10:46

## 2020-01-01 RX ADMIN — FLUDEOXYGLUCOSE F18 1 DOSE: 300 INJECTION INTRAVENOUS at 07:25

## 2020-01-01 RX ADMIN — GADOBENATE DIMEGLUMINE 20 ML: 529 INJECTION, SOLUTION INTRAVENOUS at 17:38

## 2020-01-01 RX ADMIN — IOPAMIDOL 85 ML: 612 INJECTION, SOLUTION INTRAVENOUS at 08:32

## 2020-01-19 DIAGNOSIS — J98.6 ELEVATED HEMIDIAPHRAGM: Primary | ICD-10-CM

## 2020-02-11 DIAGNOSIS — D51.0 PERNICIOUS ANEMIA: Primary | ICD-10-CM

## 2020-02-11 DIAGNOSIS — J98.6 ELEVATED HEMIDIAPHRAGM: ICD-10-CM

## 2020-02-28 ENCOUNTER — LAB (OUTPATIENT)
Dept: LAB | Facility: HOSPITAL | Age: 75
End: 2020-02-28

## 2020-02-28 ENCOUNTER — HOSPITAL ENCOUNTER (OUTPATIENT)
Dept: GENERAL RADIOLOGY | Facility: HOSPITAL | Age: 75
Discharge: HOME | End: 2020-02-28
Admitting: SURGERY

## 2020-02-28 ENCOUNTER — HOSPITAL ENCOUNTER (OUTPATIENT)
Dept: RESPIRATORY THERAPY | Facility: HOSPITAL | Age: 75
Discharge: HOME | End: 2020-02-28

## 2020-02-28 DIAGNOSIS — J98.6 ELEVATED HEMIDIAPHRAGM: ICD-10-CM

## 2020-02-28 DIAGNOSIS — D51.0 PERNICIOUS ANEMIA: ICD-10-CM

## 2020-02-28 LAB
ALBUMIN SERPL-MCNC: 3.5 G/DL (ref 3.5–5.2)
ALBUMIN/GLOB SERPL: 1.2 G/DL
ALP SERPL-CCNC: 187 U/L (ref 39–117)
ALT SERPL W P-5'-P-CCNC: 46 U/L (ref 1–41)
ANION GAP SERPL CALCULATED.3IONS-SCNC: 11.2 MMOL/L (ref 5–15)
ARTERIAL PATENCY WRIST A: POSITIVE
AST SERPL-CCNC: 69 U/L (ref 1–40)
ATMOSPHERIC PRESS: 755.3 MMHG
BASE EXCESS BLDA CALC-SCNC: 7.1 MMOL/L (ref 0–2)
BASOPHILS # BLD AUTO: 0.05 10*3/MM3 (ref 0–0.2)
BASOPHILS NFR BLD AUTO: 1 % (ref 0–1.5)
BDY SITE: ABNORMAL
BILIRUB SERPL-MCNC: 1.8 MG/DL (ref 0.2–1.2)
BUN BLD-MCNC: 13 MG/DL (ref 8–23)
BUN/CREAT SERPL: 14 (ref 7–25)
CALCIUM SPEC-SCNC: 9.5 MG/DL (ref 8.6–10.5)
CHLORIDE SERPL-SCNC: 99 MMOL/L (ref 98–107)
CHOLEST SERPL-MCNC: 186 MG/DL (ref 0–200)
CO2 SERPL-SCNC: 30.8 MMOL/L (ref 22–29)
CREAT BLD-MCNC: 0.93 MG/DL (ref 0.76–1.27)
DEPRECATED RDW RBC AUTO: 45.4 FL (ref 37–54)
EOSINOPHIL # BLD AUTO: 0.12 10*3/MM3 (ref 0–0.4)
EOSINOPHIL NFR BLD AUTO: 2.5 % (ref 0.3–6.2)
ERYTHROCYTE [DISTWIDTH] IN BLOOD BY AUTOMATED COUNT: 12.2 % (ref 12.3–15.4)
FOLATE SERPL-MCNC: >20 NG/ML (ref 4.78–24.2)
GFR SERPL CREATININE-BSD FRML MDRD: 80 ML/MIN/1.73
GLOBULIN UR ELPH-MCNC: 3 GM/DL
GLUCOSE BLD-MCNC: 96 MG/DL (ref 65–99)
HCO3 BLDA-SCNC: 32.7 MMOL/L (ref 22–28)
HCT VFR BLD AUTO: 44.3 % (ref 37.5–51)
HDLC SERPL-MCNC: 75 MG/DL (ref 40–60)
HGB BLD-MCNC: 14.7 G/DL (ref 13–17.7)
IMM GRANULOCYTES # BLD AUTO: 0.01 10*3/MM3 (ref 0–0.05)
IMM GRANULOCYTES NFR BLD AUTO: 0.2 % (ref 0–0.5)
LDLC SERPL CALC-MCNC: 97 MG/DL (ref 0–100)
LDLC/HDLC SERPL: 1.29 {RATIO}
LYMPHOCYTES # BLD AUTO: 1.64 10*3/MM3 (ref 0.7–3.1)
LYMPHOCYTES NFR BLD AUTO: 34.2 % (ref 19.6–45.3)
MCH RBC QN AUTO: 33 PG (ref 26.6–33)
MCHC RBC AUTO-ENTMCNC: 33.2 G/DL (ref 31.5–35.7)
MCV RBC AUTO: 99.6 FL (ref 79–97)
MODALITY: ABNORMAL
MONOCYTES # BLD AUTO: 0.69 10*3/MM3 (ref 0.1–0.9)
MONOCYTES NFR BLD AUTO: 14.4 % (ref 5–12)
NEUTROPHILS # BLD AUTO: 2.28 10*3/MM3 (ref 1.7–7)
NEUTROPHILS NFR BLD AUTO: 47.7 % (ref 42.7–76)
NRBC BLD AUTO-RTO: 0 /100 WBC (ref 0–0.2)
PCO2 BLDA: 47.4 MM HG (ref 35–45)
PH BLDA: 7.45 PH UNITS (ref 7.35–7.45)
PLATELET # BLD AUTO: 139 10*3/MM3 (ref 140–450)
PMV BLD AUTO: 10.5 FL (ref 6–12)
PO2 BLDA: 65.1 MM HG (ref 80–100)
POTASSIUM BLD-SCNC: 4 MMOL/L (ref 3.5–5.2)
PROT SERPL-MCNC: 6.5 G/DL (ref 6–8.5)
RBC # BLD AUTO: 4.45 10*6/MM3 (ref 4.14–5.8)
SAO2 % BLDCOA: 93 % (ref 92–99)
SODIUM BLD-SCNC: 141 MMOL/L (ref 136–145)
TOTAL RATE: 18 BREATHS/MINUTE
TRIGL SERPL-MCNC: 70 MG/DL (ref 0–150)
VIT B12 BLD-MCNC: 473 PG/ML (ref 211–946)
VLDLC SERPL-MCNC: 14 MG/DL (ref 5–40)
WBC NRBC COR # BLD: 4.79 10*3/MM3 (ref 3.4–10.8)

## 2020-02-28 PROCEDURE — 82607 VITAMIN B-12: CPT

## 2020-02-28 PROCEDURE — 80053 COMPREHEN METABOLIC PANEL: CPT

## 2020-02-28 PROCEDURE — 82746 ASSAY OF FOLIC ACID SERUM: CPT

## 2020-02-28 PROCEDURE — 82803 BLOOD GASES ANY COMBINATION: CPT | Performed by: INTERNAL MEDICINE

## 2020-02-28 PROCEDURE — 71046 X-RAY EXAM CHEST 2 VIEWS: CPT

## 2020-02-28 PROCEDURE — 80061 LIPID PANEL: CPT

## 2020-02-28 PROCEDURE — 36600 WITHDRAWAL OF ARTERIAL BLOOD: CPT | Performed by: INTERNAL MEDICINE

## 2020-02-28 PROCEDURE — 36415 COLL VENOUS BLD VENIPUNCTURE: CPT

## 2020-02-28 PROCEDURE — 85025 COMPLETE CBC W/AUTO DIFF WBC: CPT

## 2020-03-02 ENCOUNTER — OFFICE VISIT (OUTPATIENT)
Dept: GASTROENTEROLOGY | Facility: CLINIC | Age: 75
End: 2020-03-02

## 2020-03-02 VITALS
WEIGHT: 223.5 LBS | SYSTOLIC BLOOD PRESSURE: 130 MMHG | RESPIRATION RATE: 16 BRPM | OXYGEN SATURATION: 92 % | HEIGHT: 71 IN | HEART RATE: 90 BPM | TEMPERATURE: 98.7 F | BODY MASS INDEX: 31.29 KG/M2 | DIASTOLIC BLOOD PRESSURE: 90 MMHG

## 2020-03-02 DIAGNOSIS — Z12.11 ENCOUNTER FOR SCREENING FOR MALIGNANT NEOPLASM OF COLON: ICD-10-CM

## 2020-03-02 DIAGNOSIS — J98.6 ELEVATED HEMIDIAPHRAGM: ICD-10-CM

## 2020-03-02 DIAGNOSIS — G56.80 PHRENIC NERVE PALSY: ICD-10-CM

## 2020-03-02 DIAGNOSIS — R79.89 ELEVATED LIVER FUNCTION TESTS: Primary | ICD-10-CM

## 2020-03-02 DIAGNOSIS — E53.8 COBALAMIN DEFICIENCY: ICD-10-CM

## 2020-03-02 PROCEDURE — 99204 OFFICE O/P NEW MOD 45 MIN: CPT | Performed by: INTERNAL MEDICINE

## 2020-03-02 NOTE — PROGRESS NOTES
Elevated Hepatic Enzymes      HPI  74 year old male presents today for consult regarding abdnormal blood work.     Symptoms first started with shortness of breath walking across the parking lot.     He has had recent blood work with elevated LFT.    He has regular bowel movements, denies rectal bleeding, melena.     Denies heartburn, reflux, nausea or vomiting.     He reports previous cardiac cath that was normal. He has never been diagnosed with heart failure.     Denies yellowing of the skin, history of hepatitis or family history of liver disease.     No more that two drinks per day, averages four drinks per week.     Denies lower extremity swelling or edema.     He has had previous liver biopsy and was told this was normal. Biopsy was obtained due to abnormal liver test.     Denies tobacco use.     No previous colonoscopy or EGD. No family history of colon cancer or polyps.     He has a diagnosis of pernicious anemia. He takes B12 shots.     LIVER BIOPSY RESULTS 9/16/2016 reviewed with patchy mild portal chronic inflammation. No significant steatosis or lobular inflammation. Mild periportal fibrosis without definite architectural distortion, iron stain is negative.       Review of Systems   Constitutional: Negative.    HENT: Negative.    Eyes: Negative.    Respiratory: Negative.    Cardiovascular: Negative.    Endocrine: Negative.    Genitourinary: Negative.    Musculoskeletal: Negative.    Skin: Negative.    Allergic/Immunologic: Negative.    Neurological: Negative.    Hematological: Negative.    Psychiatric/Behavioral: Negative.         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/2016   • Hypertension    • Lower extremity edema    • 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   • Vitamin B 12 deficiency         Social History:    Social History     Socioeconomic History   • Marital status:      Spouse name: Not on file   • Number of children: Not on file   • Years of education: Not on file   • Highest education level: Not on file   Tobacco Use   • Smoking status: Never Smoker   • Smokeless tobacco: Never Used   Substance and Sexual Activity   • Alcohol use: Yes     Alcohol/week: 6.0 standard drinks     Types: 2 Standard drinks or equivalent, 4 Shots of liquor per week   • Drug use: No   • Sexual activity: Yes     Partners: Female       Family History:   Family History   Problem Relation Age of Onset   • Heart attack Father    • Cancer Father    • Alcohol abuse Father    • Dementia Father    • Colon cancer Neg Hx    • Colon polyps Neg Hx        Surgical History:   Past Surgical History:   Procedure Laterality Date   • CARDIAC CATHETERIZATION N/A 6/3/2016    Procedure: Coronary angiography;  Surgeon: Александр Man MD;  Location: [facility] INV[facility] LOC[facility];  Service:    • CARDIAC CATHETERIZATION N/A 6/3/2016    Procedure: Right and Left Heart Cath;  Surgeon: Александр Man MD;  Location: [facility]ATH [facility]VAS[facility] LOCATION;  Service:    • CARDIAC CATHETERIZATION N/A 6/3/2016    Procedure: Left ventriculography;  Surgeon: Александр Man MD;  Location: [facility] CATH INVASIVE LOCATION;  Service:    • NOSE SURGERY           Current Outpatient Medications:   •  aspirin 81 MG EC tablet, Take 81 mg by mouth daily., Disp: , Rfl:   •  Cyanocobalamin 1000 MCG/ML kit, Inject 1,000 mcg as directed every 3 (three) months., Disp: , Rfl:   •  losartan (COZAAR) 50 MG tablet, Take 1 tablet every evening, Disp:  90 tablet, Rfl: 3  •  losartan-hydrochlorothiazide (HYZAAR) 50-12.5 MG per tablet, Take 1 tablet every morning, Disp: 90 tablet, Rfl: 3  •  sulfamethoxazole-trimethoprim (BACTRIM DS) 800-160 MG per tablet, Take 1 tablet by mouth 2 (Two) Times a Day., Disp: 30 tablet, Rfl: 0  •  mometasone-formoterol (DULERA 100) 100-5 MCG/ACT inhaler, Inhale 2 puffs 2 (two) times a day., Disp: , Rfl:     Allergies: No Known Allergies     The following portions of the patient's history were reviewed and updated as appropriate: allergies, current medications, past family history, past medical history, past social history, past surgical history and problem list.    Vitals:    03/02/20 1155   BP: 130/90   Pulse: 90   Resp: 16   Temp: 98.7 °F (37.1 °C)   SpO2: 92%         03/02/20  1155   Weight: 101 kg (223 lb 8 oz)     Body mass index is 31.17 kg/m².      PHYSICAL EXAM:  Physical Exam   HENT:   Nose: No nasal deformity.   Eyes: No scleral icterus.   Neck:   Trachea midline.   Cardiovascular: Normal rate and regular rhythm.   Pulmonary/Chest: Breath sounds normal. No respiratory distress.   Abdominal: Soft. Normal appearance and bowel sounds are normal. He exhibits no distension and no mass. There is no tenderness.   Musculoskeletal: He exhibits edema.   Bilateral 1 plus pitting edema   Lymphadenopathy:   No periumbilical lymphadenopathy.   Neurological: He is alert.   Skin: Skin is warm. No cyanosis.   Psychiatric: He has a normal mood and affect.   Vitals reviewed.        Assessment/ Plan  Harris was seen today for elevated hepatic enzymes.    Diagnoses and all orders for this visit:    Elevated liver function tests  -     CT Abdomen Liver With & Without Contrast; Future  -     Alpha - 1 - Antitrypsin  -     ALBIN  -     Anti-microsomal Antibody  -     Anti-Smooth Muscle Antibody Titer  -     CBC & Differential  -     Comprehensive Metabolic Panel  -     Ferritin  -     Celiac Disease Panel  -     Ceruloplasmin  -     Gamma GT  -      Hepatitis Panel, Acute  -     IgG, IgA, IgM  -     Iron  -     Iron Profile  -     Mitochondrial Antibodies, M2    Encounter for screening for malignant neoplasm of colon    Cobalamin deficiency    Phrenic nerve palsy    Elevated hemidiaphragm         Return for after planned testing.      Discussion:  Reviewed previous liver biopsy and recent blood work with elevated liver function tests.  Recommend liver serologies for further evaluation, orders placed.     Colon cancer screening options discussed including cologuard stool test. Given risk for sedation with colonoscopy if test would be positive, to discuss his preference for screening options again in the future.     Plans to include triple phase CT of the liver to assess for signs of cirrhosis.  CT chosen over MR due to patient self report of possible claustrophobia.  Also to order ultrasound with Doppler flow to ensure vasculature into and out of the liver is normal without congestion or thrombosis.  Also full work-up for causes of elevated LFTs given previous biopsy with portal inflammation simultaneous autoimmune disease of pernicious anemia and lack of fatty cells on previous liver biopsy.  Repeat liver biopsy is not excluded but not necessary as first test.    Documentation was completed by Edyta VARGAS acting as a scribe in the following sections (HPI, Assessment, Plan) on behalf of the billing provider. Edenilson    Note: Review and summarization of old patient records in this note have been personally reviewed by me  And   Refer to After Visit Summary for details of patient counseling, education and instructions provided during the encounter

## 2020-03-04 LAB
A1AT SERPL-MCNC: 164 MG/DL (ref 101–187)
ACTIN IGG SERPL-ACNC: 17 UNITS (ref 0–19)
ALBUMIN SERPL-MCNC: 3.9 G/DL (ref 3.7–4.7)
ALBUMIN/GLOB SERPL: 1.4 {RATIO} (ref 1.2–2.2)
ALP SERPL-CCNC: 202 IU/L (ref 39–117)
ALT SERPL-CCNC: 51 IU/L (ref 0–44)
ANA SER QL: NEGATIVE
AST SERPL-CCNC: 78 IU/L (ref 0–40)
BASOPHILS # BLD AUTO: 0 X10E3/UL (ref 0–0.2)
BASOPHILS NFR BLD AUTO: 1 %
BILIRUB SERPL-MCNC: 1.6 MG/DL (ref 0–1.2)
BUN SERPL-MCNC: 15 MG/DL (ref 8–27)
BUN/CREAT SERPL: 17 (ref 10–24)
CALCIUM SERPL-MCNC: 9.8 MG/DL (ref 8.6–10.2)
CERULOPLASMIN SERPL-MCNC: 28.9 MG/DL (ref 16–31)
CHLORIDE SERPL-SCNC: 100 MMOL/L (ref 96–106)
CO2 SERPL-SCNC: 28 MMOL/L (ref 20–29)
CREAT SERPL-MCNC: 0.9 MG/DL (ref 0.76–1.27)
ENDOMYSIUM IGA SER QL: NEGATIVE
EOSINOPHIL # BLD AUTO: 0.1 X10E3/UL (ref 0–0.4)
EOSINOPHIL NFR BLD AUTO: 2 %
ERYTHROCYTE [DISTWIDTH] IN BLOOD BY AUTOMATED COUNT: 11.9 % (ref 11.6–15.4)
FERRITIN SERPL-MCNC: 151 NG/ML (ref 30–400)
GGT SERPL-CCNC: 281 IU/L (ref 0–65)
GLOBULIN SER CALC-MCNC: 2.7 G/DL (ref 1.5–4.5)
GLUCOSE SERPL-MCNC: 90 MG/DL (ref 65–99)
HAV IGM SERPL QL IA: NEGATIVE
HBV CORE IGM SERPL QL IA: NEGATIVE
HBV SURFACE AG SERPL QL IA: NEGATIVE
HCT VFR BLD AUTO: 46.1 % (ref 37.5–51)
HCV AB S/CO SERPL IA: 0.1 S/CO RATIO (ref 0–0.9)
HGB BLD-MCNC: 15 G/DL (ref 13–17.7)
IGA SERPL-MCNC: 348 MG/DL (ref 61–437)
IGG SERPL-MCNC: 1416 MG/DL (ref 700–1600)
IGM SERPL-MCNC: 195 MG/DL (ref 15–143)
IMM GRANULOCYTES # BLD AUTO: 0 X10E3/UL (ref 0–0.1)
IMM GRANULOCYTES NFR BLD AUTO: 0 %
IRON SATN MFR SERPL: 50 % (ref 15–55)
IRON SERPL-MCNC: 135 UG/DL (ref 38–169)
LKM-1 AB SER-ACNC: 1.2 UNITS (ref 0–20)
LYMPHOCYTES # BLD AUTO: 1.7 X10E3/UL (ref 0.7–3.1)
LYMPHOCYTES NFR BLD AUTO: 34 %
MCH RBC QN AUTO: 32.4 PG (ref 26.6–33)
MCHC RBC AUTO-ENTMCNC: 32.5 G/DL (ref 31.5–35.7)
MCV RBC AUTO: 100 FL (ref 79–97)
MITOCHONDRIA M2 IGG SER-ACNC: 23.2 UNITS (ref 0–20)
MONOCYTES # BLD AUTO: 0.7 X10E3/UL (ref 0.1–0.9)
MONOCYTES NFR BLD AUTO: 13 %
NEUTROPHILS # BLD AUTO: 2.5 X10E3/UL (ref 1.4–7)
NEUTROPHILS NFR BLD AUTO: 50 %
PLATELET # BLD AUTO: 138 X10E3/UL (ref 150–450)
POTASSIUM SERPL-SCNC: 3.9 MMOL/L (ref 3.5–5.2)
PROT SERPL-MCNC: 6.6 G/DL (ref 6–8.5)
RBC # BLD AUTO: 4.63 X10E6/UL (ref 4.14–5.8)
SODIUM SERPL-SCNC: 143 MMOL/L (ref 134–144)
TIBC SERPL-MCNC: 270 UG/DL (ref 250–450)
TTG IGA SER-ACNC: <2 U/ML (ref 0–3)
UIBC SERPL-MCNC: 135 UG/DL (ref 111–343)
WBC # BLD AUTO: 5.1 X10E3/UL (ref 3.4–10.8)

## 2020-03-05 ENCOUNTER — TRANSCRIBE ORDERS (OUTPATIENT)
Dept: ADMINISTRATIVE | Facility: HOSPITAL | Age: 75
End: 2020-03-05

## 2020-03-05 DIAGNOSIS — J98.6 DISORDERS OF DIAPHRAGM: ICD-10-CM

## 2020-03-05 DIAGNOSIS — Z12.11 SPECIAL SCREENING FOR MALIGNANT NEOPLASMS, COLON: ICD-10-CM

## 2020-03-05 DIAGNOSIS — G56.80 PHRENIC NERVE PARALYSIS: ICD-10-CM

## 2020-03-05 DIAGNOSIS — E53.8 BIOTIN-(PROPIONYL-COA-CARBOXYLASE) LIGASE DEFICIENCY: ICD-10-CM

## 2020-03-05 DIAGNOSIS — R94.5 NONSPECIFIC ABNORMAL RESULTS OF LIVER FUNCTION STUDY: Primary | ICD-10-CM

## 2020-03-06 ENCOUNTER — TELEPHONE (OUTPATIENT)
Dept: GASTROENTEROLOGY | Facility: CLINIC | Age: 75
End: 2020-03-06

## 2020-03-06 DIAGNOSIS — R79.89 ELEVATED LIVER FUNCTION TESTS: Primary | ICD-10-CM

## 2020-03-06 NOTE — TELEPHONE ENCOUNTER
Neris with [facility] called and stated the pt needs to have a [id number] to get authorization for his ultrasound (CPT code 77977). She states our provider, Dr. Talavera (saw pt on 3/2/2020), needs to call [employer] at [phone] for this authorization. If there are any questions, Neris can be reached at [phone].

## 2020-03-09 ENCOUNTER — APPOINTMENT (OUTPATIENT)
Dept: CARDIOLOGY | Facility: HOSPITAL | Age: 75
End: 2020-03-09

## 2020-03-09 ENCOUNTER — APPOINTMENT (OUTPATIENT)
Dept: CT IMAGING | Facility: HOSPITAL | Age: 75
End: 2020-03-09

## 2020-03-09 ENCOUNTER — HOSPITAL ENCOUNTER (OUTPATIENT)
Dept: ULTRASOUND IMAGING | Facility: HOSPITAL | Age: 75
End: 2020-03-09

## 2020-03-09 ENCOUNTER — HOSPITAL ENCOUNTER (OUTPATIENT)
Dept: CT IMAGING | Facility: HOSPITAL | Age: 75
Discharge: HOME | End: 2020-03-09
Admitting: INTERNAL MEDICINE

## 2020-03-09 DIAGNOSIS — R79.89 ELEVATED LIVER FUNCTION TESTS: ICD-10-CM

## 2020-03-09 DIAGNOSIS — D51.0 PERNICIOUS ANEMIA: Primary | ICD-10-CM

## 2020-03-09 PROCEDURE — 74170 CT ABD WO CNTRST FLWD CNTRST: CPT

## 2020-03-09 PROCEDURE — 25010000002 IOPAMIDOL 61 % SOLUTION: Performed by: INTERNAL MEDICINE

## 2020-03-09 PROCEDURE — 82565 ASSAY OF CREATININE: CPT

## 2020-03-09 RX ADMIN — IOPAMIDOL 95 ML: 612 INJECTION, SOLUTION INTRAVENOUS at 09:09

## 2020-03-10 DIAGNOSIS — R93.2 ABNORMAL CT OF LIVER: Primary | ICD-10-CM

## 2020-03-10 DIAGNOSIS — K74.60 CIRRHOSIS OF LIVER WITHOUT ASCITES, UNSPECIFIED HEPATIC CIRRHOSIS TYPE (HCC): ICD-10-CM

## 2020-03-10 LAB — CREAT BLDA-MCNC: 0.9 MG/DL (ref 0.6–1.3)

## 2020-03-11 ENCOUNTER — HOSPITAL ENCOUNTER (OUTPATIENT)
Dept: CARDIOLOGY | Facility: HOSPITAL | Age: 75
Discharge: HOME | End: 2020-03-11
Admitting: SURGERY

## 2020-03-11 ENCOUNTER — LAB (OUTPATIENT)
Dept: LAB | Facility: HOSPITAL | Age: 75
End: 2020-03-11

## 2020-03-11 DIAGNOSIS — R93.2 ABNORMAL CT OF LIVER: ICD-10-CM

## 2020-03-11 DIAGNOSIS — K74.60 CIRRHOSIS OF LIVER WITHOUT ASCITES, UNSPECIFIED HEPATIC CIRRHOSIS TYPE (HCC): ICD-10-CM

## 2020-03-11 DIAGNOSIS — D51.0 PERNICIOUS ANEMIA: ICD-10-CM

## 2020-03-11 LAB
ALPHA-FETOPROTEIN: 3.54 NG/ML (ref 0–8.3)
AMMONIA BLD-SCNC: 36 UMOL/L (ref 16–60)
BH CV VAS HEPATIC PORTAL VEIN DIAMETER: 1.1 CM
BH CV VAS SMA AORTA PSV: 57.4 CM/S
BH CV VAS SMA HEPATIC EDV: 26.3 CM/S
BH CV VAS SMA HEPATIC PSV: 62.3 CM/S
BH CV VAS SMA SPLENIC EDV: 29 CM/S
BH CV VAS SMA SPLENIC PSV: 70.4 CM/S
CANCER AG19-9 SERPL-ACNC: <0.6 U/ML
CEA SERPL-MCNC: 3.24 NG/ML
INR PPP: 1.02 (ref 0.9–1.1)
PROTHROMBIN TIME: 13.1 SECONDS (ref 11.7–14.2)

## 2020-03-11 PROCEDURE — 93975 VASCULAR STUDY: CPT

## 2020-03-11 PROCEDURE — 82140 ASSAY OF AMMONIA: CPT | Performed by: SURGERY

## 2020-03-11 PROCEDURE — 36415 COLL VENOUS BLD VENIPUNCTURE: CPT

## 2020-03-11 PROCEDURE — 86301 IMMUNOASSAY TUMOR CA 19-9: CPT | Performed by: SURGERY

## 2020-03-11 PROCEDURE — 82105 ALPHA-FETOPROTEIN SERUM: CPT | Performed by: SURGERY

## 2020-03-11 PROCEDURE — 82378 CARCINOEMBRYONIC ANTIGEN: CPT

## 2020-03-11 PROCEDURE — 85610 PROTHROMBIN TIME: CPT | Performed by: SURGERY

## 2020-03-12 ENCOUNTER — TELEPHONE (OUTPATIENT)
Dept: GASTROENTEROLOGY | Facility: CLINIC | Age: 75
End: 2020-03-12

## 2020-03-12 NOTE — TELEPHONE ENCOUNTER
Labs looked ok  Cea was slightly elevated if non smoker--will need to address at some point   Dopplers revealed normal vessels and appropriate direction of flow  Reviewed ct with [place] covarrubias.  The liver morphology is cirrhotic and significantly more so than last imaging.  There is evidence of portal hypertension and splenomegaly but minimal fluid  The nodule seen was 1.4 cm has features of HCC but regenerative nodule can not be excluded. It is only seen on the third phase of the triple phase ct.  radiologist marked the regular contrast images to note the approximate location.  Given the size, difficulty seeing the nodule without contrast there is concern it will be a “blinded” approach and we will only get cirrhotic tissue.  This will also expose to potential risk of biopsy including bleeding and with his lung compression on left, if anything occurred on the right could be problematic.  As he is likely not a transplant candidate and afp is normal it would be reasonable to consider a short term evaluation ? 3 months to repeat afp and size/growth of the nodule  Thoughts  -short term follow up of imaging and afp  -could consult with transplant surgeon to discuss possible treatment options if there is growth or further evidence of HCC over regenerative nodule (like Radiofrequency ablation)  -proceed with biopsy attempt with above considerations    Also could consider MR with eovist or EGD/EUS guided

## 2020-03-13 ENCOUNTER — TRANSCRIBE ORDERS (OUTPATIENT)
Dept: ADMINISTRATIVE | Facility: HOSPITAL | Age: 75
End: 2020-03-13

## 2020-03-13 DIAGNOSIS — R06.00 DYSPNEA, UNSPECIFIED TYPE: Primary | ICD-10-CM

## 2020-03-19 ENCOUNTER — HOSPITAL ENCOUNTER (OUTPATIENT)
Dept: CARDIOLOGY | Facility: HOSPITAL | Age: 75
Discharge: HOME | End: 2020-03-19
Admitting: INTERNAL MEDICINE

## 2020-03-19 VITALS — WEIGHT: 224 LBS | HEART RATE: 90 BPM | BODY MASS INDEX: 31.36 KG/M2 | HEIGHT: 71 IN

## 2020-03-19 DIAGNOSIS — R06.00 DYSPNEA, UNSPECIFIED TYPE: ICD-10-CM

## 2020-03-19 LAB
AORTIC ARCH: 2.5 CM
AORTIC ROOT ANNULUS: 2.1 CM
ASCENDING AORTA: 3.8 CM
BH CV ECHO MEAS - ACS: 2.3 CM
BH CV ECHO MEAS - AO MAX PG (FULL): 2.4 MMHG
BH CV ECHO MEAS - AO MAX PG: 7 MMHG
BH CV ECHO MEAS - AO MEAN PG (FULL): 1 MMHG
BH CV ECHO MEAS - AO MEAN PG: 4 MMHG
BH CV ECHO MEAS - AO ROOT AREA (BSA CORRECTED): 1.7
BH CV ECHO MEAS - AO ROOT AREA: 10.8 CM^2
BH CV ECHO MEAS - AO ROOT DIAM: 3.7 CM
BH CV ECHO MEAS - AO V2 MAX: 132 CM/SEC
BH CV ECHO MEAS - AO V2 MEAN: 98.9 CM/SEC
BH CV ECHO MEAS - AO V2 VTI: 26.5 CM
BH CV ECHO MEAS - AVA(I,A): 3.1 CM^2
BH CV ECHO MEAS - AVA(I,D): 3.1 CM^2
BH CV ECHO MEAS - AVA(V,A): 3.1 CM^2
BH CV ECHO MEAS - AVA(V,D): 3.1 CM^2
BH CV ECHO MEAS - BSA(HAYCOCK): 2.3 M^2
BH CV ECHO MEAS - BSA: 2.2 M^2
BH CV ECHO MEAS - BZI_BMI: 31.2 KILOGRAMS/M^2
BH CV ECHO MEAS - BZI_METRIC_HEIGHT: 180.3 CM
BH CV ECHO MEAS - BZI_METRIC_WEIGHT: 101.6 KG
BH CV ECHO MEAS - EDV(MOD-SP2): 64 ML
BH CV ECHO MEAS - EDV(MOD-SP4): 69 ML
BH CV ECHO MEAS - EDV(TEICH): 93.4 ML
BH CV ECHO MEAS - EF(CUBED): 73.3 %
BH CV ECHO MEAS - EF(MOD-BP): 56 %
BH CV ECHO MEAS - EF(MOD-SP2): 56.3 %
BH CV ECHO MEAS - EF(MOD-SP4): 56.5 %
BH CV ECHO MEAS - EF(TEICH): 65.2 %
BH CV ECHO MEAS - ESV(MOD-SP2): 28 ML
BH CV ECHO MEAS - ESV(MOD-SP4): 30 ML
BH CV ECHO MEAS - ESV(TEICH): 32.5 ML
BH CV ECHO MEAS - FS: 35.6 %
BH CV ECHO MEAS - IVS/LVPW: 1.5
BH CV ECHO MEAS - IVSD: 1.4 CM
BH CV ECHO MEAS - LAT PEAK E' VEL: 7 CM/SEC
BH CV ECHO MEAS - LV DIASTOLIC VOL/BSA (35-75): 31.2 ML/M^2
BH CV ECHO MEAS - LV MASS(C)D: 193.1 GRAMS
BH CV ECHO MEAS - LV MASS(C)DI: 87.2 GRAMS/M^2
BH CV ECHO MEAS - LV MAX PG: 4.6 MMHG
BH CV ECHO MEAS - LV MEAN PG: 3 MMHG
BH CV ECHO MEAS - LV SYSTOLIC VOL/BSA (12-30): 13.6 ML/M^2
BH CV ECHO MEAS - LV V1 MAX: 107 CM/SEC
BH CV ECHO MEAS - LV V1 MEAN: 79.1 CM/SEC
BH CV ECHO MEAS - LV V1 VTI: 21.8 CM
BH CV ECHO MEAS - LVIDD: 4.5 CM
BH CV ECHO MEAS - LVIDS: 2.9 CM
BH CV ECHO MEAS - LVLD AP2: 7.4 CM
BH CV ECHO MEAS - LVLD AP4: 7.4 CM
BH CV ECHO MEAS - LVLS AP2: 5.9 CM
BH CV ECHO MEAS - LVLS AP4: 6.2 CM
BH CV ECHO MEAS - LVOT AREA (M): 3.8 CM^2
BH CV ECHO MEAS - LVOT AREA: 3.8 CM^2
BH CV ECHO MEAS - LVOT DIAM: 2.2 CM
BH CV ECHO MEAS - LVPWD: 0.96 CM
BH CV ECHO MEAS - MED PEAK E' VEL: 5 CM/SEC
BH CV ECHO MEAS - MV A DUR: 0.11 SEC
BH CV ECHO MEAS - MV A MAX VEL: 84.8 CM/SEC
BH CV ECHO MEAS - MV DEC TIME: 0.12 SEC
BH CV ECHO MEAS - MV E MAX VEL: 44.3 CM/SEC
BH CV ECHO MEAS - MV E/A: 0.52
BH CV ECHO MEAS - MV MAX PG: 4.8 MMHG
BH CV ECHO MEAS - MV MEAN PG: 2 MMHG
BH CV ECHO MEAS - MV V2 MAX: 109 CM/SEC
BH CV ECHO MEAS - MV V2 MEAN: 71.7 CM/SEC
BH CV ECHO MEAS - MV V2 VTI: 25.6 CM
BH CV ECHO MEAS - MVA(VTI): 3.2 CM^2
BH CV ECHO MEAS - PA MAX PG (FULL): 5 MMHG
BH CV ECHO MEAS - PA MAX PG: 5.9 MMHG
BH CV ECHO MEAS - PA V2 MAX: 121 CM/SEC
BH CV ECHO MEAS - PVA(V,A): 2.9 CM^2
BH CV ECHO MEAS - PVA(V,D): 2.9 CM^2
BH CV ECHO MEAS - QP/QS: 0.84
BH CV ECHO MEAS - RV MAX PG: 0.86 MMHG
BH CV ECHO MEAS - RV MEAN PG: 1 MMHG
BH CV ECHO MEAS - RV V1 MAX: 46.3 CM/SEC
BH CV ECHO MEAS - RV V1 MEAN: 36.7 CM/SEC
BH CV ECHO MEAS - RV V1 VTI: 9.3 CM
BH CV ECHO MEAS - RVOT AREA: 7.5 CM^2
BH CV ECHO MEAS - RVOT DIAM: 3.1 CM
BH CV ECHO MEAS - SI(AO): 128.8 ML/M^2
BH CV ECHO MEAS - SI(CUBED): 30.6 ML/M^2
BH CV ECHO MEAS - SI(LVOT): 37.4 ML/M^2
BH CV ECHO MEAS - SI(MOD-SP2): 16.3 ML/M^2
BH CV ECHO MEAS - SI(MOD-SP4): 17.6 ML/M^2
BH CV ECHO MEAS - SI(TEICH): 27.5 ML/M^2
BH CV ECHO MEAS - SUP REN AO DIAM: 2.1 CM
BH CV ECHO MEAS - SV(AO): 284.9 ML
BH CV ECHO MEAS - SV(CUBED): 67.7 ML
BH CV ECHO MEAS - SV(LVOT): 82.9 ML
BH CV ECHO MEAS - SV(MOD-SP2): 36 ML
BH CV ECHO MEAS - SV(MOD-SP4): 39 ML
BH CV ECHO MEAS - SV(RVOT): 69.8 ML
BH CV ECHO MEAS - SV(TEICH): 60.9 ML
BH CV ECHO MEAS - TAPSE (>1.6): 2.2 CM2
BH CV ECHO MEASUREMENTS AVERAGE E/E' RATIO: 7.38
LEFT ATRIUM VOLUME INDEX: 28 ML/M2
SINUS: 3 CM
STJ: 3.7 CM

## 2020-03-19 PROCEDURE — 93306 TTE W/DOPPLER COMPLETE: CPT | Performed by: INTERNAL MEDICINE

## 2020-03-19 PROCEDURE — 93306 TTE W/DOPPLER COMPLETE: CPT

## 2020-06-04 ENCOUNTER — TELEPHONE (OUTPATIENT)
Dept: GASTROENTEROLOGY | Facility: CLINIC | Age: 75
End: 2020-06-04

## 2020-06-04 DIAGNOSIS — K74.60 CIRRHOSIS OF LIVER WITHOUT ASCITES, UNSPECIFIED HEPATIC CIRRHOSIS TYPE (HCC): ICD-10-CM

## 2020-06-04 DIAGNOSIS — K76.9 LIVER LESION: ICD-10-CM

## 2020-06-04 DIAGNOSIS — R93.2 ABNORMAL CT OF LIVER: Primary | ICD-10-CM

## 2020-06-04 DIAGNOSIS — D51.0 PERNICIOUS ANEMIA: ICD-10-CM

## 2020-06-04 DIAGNOSIS — K74.60 HEPATIC CIRRHOSIS, UNSPECIFIED HEPATIC CIRRHOSIS TYPE, UNSPECIFIED WHETHER ASCITES PRESENT (HCC): Primary | ICD-10-CM

## 2020-06-04 DIAGNOSIS — J98.6 ELEVATED HEMIDIAPHRAGM: ICD-10-CM

## 2020-06-04 NOTE — TELEPHONE ENCOUNTER
----- Message from ALICIA Romero sent at 6/4/2020 12:49 PM EDT -----  Regarding: RE: Place lab orders.  Cirrhosis, liver lesion  ----- Message -----  From: Geri Chiang MA  Sent: 6/4/2020  12:28 PM EDT  To: ALICIA Watkins  Subject: RE: Place lab orders.                            Needing to know the DX?     ----- Message -----  From: Edyta Rajan APRN  Sent: 6/4/2020  12:16 PM EDT  To: Geri Chiang MA  Subject: Place lab orders.                                Please place orders for CBC, INR, AFP, CEA and CBC   For Dr. Talavera on this patient.    Dr. Talavera requested lab orders on this patient.  Please place orders, see above, and Dr. Talavera's name.    Edenilson       Established Patient    Ms. Lund a 87 y.o. female who presents today with:  CC: Follow-Up (3 month follow up) and Other (fill out DMV form)        Assessment and Plan    1. Chronic neck pain  Chronic pain in multiple  Joints and areas. Back pain might be the worst. Managed by pain management. Doesn't want injections. Not a good candidate for medications to manage pain. Would suggest she do tylenol as needed and strongly recommend she consider injection therapy. Can also consider lidocaine patch. I have sent a prescription to her pharmacy. Sister can help place patch on neck. She will consider injection therapy and contact [place]. She also may benefit from pain psychology. It appears that pain is a significant component of her quality of life.    2. Essential hypertension, benign  I reviewed her medication list with her and home BPs. Most of the SBP are in the 120-140 with occasional low SBP. She will contact Dr. Bloch office with her results. Currently her blood pressure is at goal today and therefore I will not make any changes to her blood pressure regimen. In the last few days her blood pressure has been normal.    3. Osteopenia of other site  She does have osteopenia. She is a candidate for bisphosphonate therapy secondary to hip fractures. She has a history of gastroesophageal reflux disease therefore oral bisphosphonates not ideal and is relatively adverse to taking additional medications or injections. We will need to discuss this in further detail at the next appointment. For now she'll continue vitamin D replacement. She may consider using Reclast but will think about it.    4. Vitamin D deficiency  Continue vitamin D supplementation. I will check a vitamin D level.    5. Primary osteoarthritis of shoulder, unspecified laterality  Unchanged. She can try lidocaine patches over the most painful area of her right shoulder. She may also be a good candidate for injection therapy and we'll consider this  with her pain management physician    6. Gastroesophageal reflux disease without esophagitis  Stable. Currently compliant with Protonix.      Current Outpatient Prescriptions   Medication Sig Dispense Refill   • pantoprazole (PROTONIX) 40 MG Tablet Delayed Response TAKE 1 TABLET BY MOUTH ONCE DAILY 30 Tab 4   • ranitidine (ZANTAC) 150 MG Tab TAKE 1 TABLET BY MOUTH ONCE DAILY 30 Tab 5   • vitamin D (CHOLECALCIFEROL) 1000 UNIT Tab Take 1,000 Units by mouth every day.     • acetaminophen (TYLENOL) 325 MG Tab Take 650 mg by mouth every four hours as needed.     • Multiple Vitamins-Minerals (PRESERVISION AREDS 2) Cap Take  by mouth.     • spironolactone (ALDACTONE) 25 MG Tab Half tablet daily (Patient taking differently: Take 50 mg by mouth every day. Half tablet daily  Indications: 1/2 with food daily) 1 Tab 0   • valsartan (DIOVAN) 320 MG tablet Take 1 Tab by mouth every day. 30 Tab 3   • doxazosin (CARDURA) 2 MG Tab 2 pills at night (Patient taking differently: Take 2 mg by mouth 2 Times a Day. 2 pills at night) 30 Tab 0   • Polyethylene Glycol 3350 (MIRALAX PO) Take  by mouth.     • aspirin (ASA) 81 MG CHEW chewable tablet Take 81 mg by mouth every day.     • simethicone (GAS-X) 80 MG CHEW Take 80 mg by mouth every 6 hours as needed.     • lorazepam (ATIVAN) 0.5 MG Tab Take 0.5 mg by mouth every four hours as needed for Anxiety.     • Sennosides (SENNA LAX PO) Take 8.6 mg by mouth. Indications: 2 mon, wed, fri     • docusate sodium (COLACE) 100 MG Cap Take 100 mg by mouth 2 times a day.       No current facility-administered medications for this visit.          followup No Follow-up on file.    This note was created using voice recognition software (Dragon). The accuracy of the dictation is limited by the abilities of the software. I have reviewed the note prior to signing, however some errors in grammar and context are still possible. If you have any questions related to this note please do not hesitate to contact  our office.   _______________________________________________________    HPI:   1. Chronic neck pain  S/p PT. Intermediate effect after 4 treatments. Better pain when doing the home program. Evaluated at [place] [facility] for this. Offered injection therapy but she did not want it due to prior experience with injections that were not helpful. Upper back injection or knee pain. They recommended tylenol.   She has pain all over including shoulders, right. Pop horses in the bilateral legs.  Has pain all day and every day. Traumeel helps with the wrist. Can't use traumeel elsewhere because she cannot reach it. She tried  Hydrocodone but became tolerant and doesn't want to take it. When she takes regular tylenol she becomes nauseous and dizzy and stopped it. It helps the pain. Neurotin did not work. Later in the afternoon her pain is better  I wrote for lidocaine patches in the past which she never filled    2. Essential hypertension, benign  At home her SBP pressures can be in double digits 89 for example. Yesterday 108/57. Later in day 88/50. Same machine. New batteries. Most days are 110-140.  When BP is lower she has light headedness when standing. Last week she thinks she almost fainted during the Eclipse. SBP in the 70s. No syncopal episodes and no recent falls. Thinks she may take her medications at the same time every day. Salt intake may vary. Level of activity varies based on level of pain    3. Osteopenia of other site  Reviewed her DXA  With osteopenia and 6/16/2017 Major risk % 27.3. And Hip 9.8 %.  Currently compliant with vitamin D    4. Vitamin D deficiency  Compliant with vitamin D. No recent fractures.    5. Primary osteoarthritis of shoulder, unspecified laterality  Chronic pain. No injection therapy. Unable to lift greater than 90 for prolonged time without pain.     6. Gastroesophageal reflux disease without esophagitis  Stable. No significant symptoms       has a past medical history of  Chronic venous insufficiency (5/26/2016); Hypertension (5/26/2016); LLQ abdominal mass (5/26/2016); Palpitations (5/26/2016); and WILLIAN (renal artery stenosis) (CMS-HCC) (5/26/2016).     reports that she has never smoked. She has never used smokeless tobacco. She reports that she does not drink alcohol or use drugs.      ROS: Pertinent positives as stated in HPI, all others reviewed as negative:  Cardiovascular ROS: No exercise intolerance, No chest pain, No shortness of breath, No dyspnea on exertion        Physical Exam  /80   Pulse 80   Temp 36.4 °C (97.5 °F)   Wt 53.9 kg (118 lb 12.8 oz)   SpO2 95%   BMI 24.83 kg/m²   Constitutional:  oriented to person, place, and time. No distress.   Generalized tenderness throughout the cervical neck muscles and trapezius muscles. Chronic flexion of the cervical neck. Pain with abduction of the right shoulder      Current Outpatient Prescriptions on File Prior to Visit   Medication Sig Dispense Refill   • pantoprazole (PROTONIX) 40 MG Tablet Delayed Response TAKE 1 TABLET BY MOUTH ONCE DAILY 30 Tab 4   • ranitidine (ZANTAC) 150 MG Tab TAKE 1 TABLET BY MOUTH ONCE DAILY 30 Tab 5   • vitamin D (CHOLECALCIFEROL) 1000 UNIT Tab Take 1,000 Units by mouth every day.     • acetaminophen (TYLENOL) 325 MG Tab Take 650 mg by mouth every four hours as needed.     • Multiple Vitamins-Minerals (PRESERVISION AREDS 2) Cap Take  by mouth.     • spironolactone (ALDACTONE) 25 MG Tab Half tablet daily (Patient taking differently: Take 50 mg by mouth every day. Half tablet daily  Indications: 1/2 with food daily) 1 Tab 0   • valsartan (DIOVAN) 320 MG tablet Take 1 Tab by mouth every day. 30 Tab 3   • doxazosin (CARDURA) 2 MG Tab 2 pills at night (Patient taking differently: Take 2 mg by mouth 2 Times a Day. 2 pills at night) 30 Tab 0   • Polyethylene Glycol 3350 (MIRALAX PO) Take  by mouth.     • aspirin (ASA) 81 MG CHEW chewable tablet Take 81 mg by mouth every day.     • simethicone  (GAS-X) 80 MG CHEW Take 80 mg by mouth every 6 hours as needed.     • lorazepam (ATIVAN) 0.5 MG Tab Take 0.5 mg by mouth every four hours as needed for Anxiety.     • Sennosides (SENNA LAX PO) Take 8.6 mg by mouth. Indications: 2 mon, wed, fri     • docusate sodium (COLACE) 100 MG Cap Take 100 mg by mouth 2 times a day.       No current facility-administered medications on file prior to visit.            Signed by: Jaswinder Iqbal M.D.

## 2020-06-04 NOTE — TELEPHONE ENCOUNTER
----- Message from ALICIA Watkins sent at 6/4/2020 12:16 PM EDT -----  Regarding: Place lab orders.  Please place orders for CBC, INR, AFP, CEA and CBC   For Dr. Talavera on this patient.    Dr. Talavera requested lab orders on this patient.  Please place orders, see above, and Dr. Talavera's name.    Edenilson

## 2020-06-11 DIAGNOSIS — D51.0 PERNICIOUS ANEMIA: Primary | ICD-10-CM

## 2020-06-12 LAB
AFP-TM SERPL-MCNC: 3.7 NG/ML (ref 0–8.3)
ALBUMIN SERPL-MCNC: 3.5 G/DL (ref 3.5–5.2)
ALBUMIN/GLOB SERPL: 1 G/DL
ALP SERPL-CCNC: 179 U/L (ref 39–117)
ALT SERPL-CCNC: 56 U/L (ref 1–41)
AST SERPL-CCNC: 80 U/L (ref 1–40)
BASOPHILS # BLD AUTO: 0.03 10*3/MM3 (ref 0–0.2)
BASOPHILS NFR BLD AUTO: 0.8 % (ref 0–1.5)
BILIRUB SERPL-MCNC: 1.7 MG/DL (ref 0.2–1.2)
BUN SERPL-MCNC: 16 MG/DL (ref 8–23)
BUN/CREAT SERPL: 17.8 (ref 7–25)
CALCIUM SERPL-MCNC: 10.2 MG/DL (ref 8.6–10.5)
CEA SERPL-MCNC: 4.2 NG/ML
CHLORIDE SERPL-SCNC: 104 MMOL/L (ref 98–107)
CO2 SERPL-SCNC: 30 MMOL/L (ref 22–29)
CREAT SERPL-MCNC: 0.9 MG/DL (ref 0.76–1.27)
EOSINOPHIL # BLD AUTO: 0.11 10*3/MM3 (ref 0–0.4)
EOSINOPHIL NFR BLD AUTO: 2.8 % (ref 0.3–6.2)
ERYTHROCYTE [DISTWIDTH] IN BLOOD BY AUTOMATED COUNT: 12.1 % (ref 12.3–15.4)
GLOBULIN SER CALC-MCNC: 3.4 GM/DL
GLUCOSE SERPL-MCNC: 94 MG/DL (ref 65–99)
HCT VFR BLD AUTO: 42.1 % (ref 37.5–51)
HGB BLD-MCNC: 14 G/DL (ref 13–17.7)
IMM GRANULOCYTES # BLD AUTO: 0.01 10*3/MM3 (ref 0–0.05)
IMM GRANULOCYTES NFR BLD AUTO: 0.3 % (ref 0–0.5)
INR PPP: 1.05 (ref 0.9–1.1)
LYMPHOCYTES # BLD AUTO: 1.44 10*3/MM3 (ref 0.7–3.1)
LYMPHOCYTES NFR BLD AUTO: 36.9 % (ref 19.6–45.3)
MCH RBC QN AUTO: 33.7 PG (ref 26.6–33)
MCHC RBC AUTO-ENTMCNC: 33.3 G/DL (ref 31.5–35.7)
MCV RBC AUTO: 101.2 FL (ref 79–97)
MONOCYTES # BLD AUTO: 0.55 10*3/MM3 (ref 0.1–0.9)
MONOCYTES NFR BLD AUTO: 14.1 % (ref 5–12)
NEUTROPHILS # BLD AUTO: 1.76 10*3/MM3 (ref 1.7–7)
NEUTROPHILS NFR BLD AUTO: 45.1 % (ref 42.7–76)
NRBC BLD AUTO-RTO: 0 /100 WBC (ref 0–0.2)
PLATELET # BLD AUTO: 112 10*3/MM3 (ref 140–450)
POTASSIUM SERPL-SCNC: 4.1 MMOL/L (ref 3.5–5.2)
PROT SERPL-MCNC: 6.9 G/DL (ref 6–8.5)
PROTHROMBIN TIME: 13.4 SECONDS (ref 11.7–14.2)
RBC # BLD AUTO: 4.16 10*6/MM3 (ref 4.14–5.8)
SODIUM SERPL-SCNC: 141 MMOL/L (ref 136–145)
WBC # BLD AUTO: 3.9 10*3/MM3 (ref 3.4–10.8)

## 2020-06-23 NOTE — PROGRESS NOTES
Date of Office Visit: 2020    Patient Name: Harris Christian  : 1945    Encounter Provider: Алексаднр Man MD  Referring Provider: No ref. provider found  Place of Service: [facility] CARDIOLOGY  Patient Care Team:  Provider, No Known as PCP - Fam Carson MD as Consulting Physician (Gastroenterology)      Chief Complaint   Patient presents with   • Leg Swelling     History of Present Illness  The patient is a 74-year-old white male with a history of phrenic nerve palsy and elevated  hemidiaphragm.  I evaluated him in number years ago for shortness of breath.  At the time the patient had both a right and left heart catheterization.  The findings indicated normal left ventricular systolic function, normal intracardiac pressures without evidence of intracardiac shunt.  And normal coronary arteries.    The patient presents today with progressive lower extremity edema.  Most of the edema present appears to be by early afternoon and most of it dissipates after a night's rest.  He does not use excessive salt.  His weight is down about 10 pounds in the last 6 months.  Along with this new finding the patient has had a duplex portal hepatic scan which shows no evidence of thrombosis and normal portal hepatic vein flow.  A CT scan showed that there is progression of cirrhotic liver morphology and more prominent varices.  There is also a slight increase in splenic size.  The patient's liver functions are also elevated now.    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   • Hypertension    • Lower extremity edema    • Phrenic nerve palsy 2017   • Vitamin B 12 deficiency          Past Surgical History:   Procedure  Laterality Date   • CARDIAC CATHETERIZATION N/A 6/3/2016    Procedure: Coronary angiography;  Surgeon: Александр Man MD;  Location: [facility] CATH INVASIVE LOCATION;  Service:    • CARDIAC CATHETERIZATION N/A 6/3/2016    Procedure: Right and Left Heart Cath;  Surgeon: Александр Man MD;  Location: [facility] CATH INVASIVE LOCATION;  Service:    • CARDIAC CATHETERIZATION N/A 6/3/2016    Procedure: Left ventriculography;  Surgeon: Александр Man MD;  Location: [facility] CATH INVASIVE LOCATION;  Service:    • NOSE SURGERY             Current Outpatient Medications:   •  Cyanocobalamin 1000 MCG/ML kit, Inject 1,000 mcg as directed every 3 (three) months., Disp: , Rfl:   •  benazepril (Lotensin) 20 MG tablet, Take 1 tablet by mouth 2 (two) times a day., Disp: 60 tablet, Rfl: 1  •  spironolactone (ALDACTONE) 25 MG tablet, Take 1 tablet by mouth Daily., Disp: 30 tablet, Rfl: 1      Social History     Socioeconomic History   • Marital status:      Spouse name: Not on file   • Number of children: 0   • Years of education: Not on file   • Highest education level: Not on file   Occupational History   • Occupation:    Tobacco Use   • Smoking status: Never Smoker   • Smokeless tobacco: Never Used   Substance and Sexual Activity   • Alcohol use: Yes     Comment: rare/caffeine use   • Drug use: No   • Sexual activity: Yes     Partners: Female         Review of Systems   Constitution: Negative for malaise/fatigue.   Eyes: Negative for blurred vision.   Cardiovascular: Positive for leg swelling. Negative for chest pain, orthopnea, palpitations and paroxysmal nocturnal dyspnea.   Respiratory: Negative for shortness of breath.    Musculoskeletal: Negative for neck pain.   Neurological: Negative for headaches.       Procedures      ECG 12 Lead  Date/Time: 6/23/2020 12:50 PM  Performed by: Александр Man MD  Authorized by: Александр Man MD   Comparison: compared with previous ECG from  "5/16/2016  Comparison to previous ECG: Isolated PVC is new.  Otherwise no change.  Rhythm: sinus rhythm  Ectopy: unifocal PVCs  Rate: normal  Conduction: conduction normal  QRS axis: normal                  Objective:    BP (!) 162/102 (BP Location: Right arm, Patient Position: Sitting, Cuff Size: Adult)   Pulse 91   Ht 180.3 cm (71\")   Wt 99.2 kg (218 lb 9.6 oz)   SpO2 98%   BMI 30.49 kg/m²         Physical Exam   Constitutional: He is oriented to person, place, and time. He appears well-developed and well-nourished.   HENT:   Head: Normocephalic.   Eyes: Pupils are equal, round, and reactive to light.   Neck: Normal range of motion. No JVD present. Carotid bruit is not present. No thyromegaly present.   Cardiovascular: Normal rate, regular rhythm, S1 normal, S2 normal, normal heart sounds and intact distal pulses. Exam reveals no gallop and no friction rub.   No murmur heard.  Pulmonary/Chest: Effort normal. He has decreased breath sounds in the left middle field and the left lower field.   Abdominal: Soft. Bowel sounds are normal. He exhibits no distension, no pulsatile liver, no abdominal bruit and no mass. There is no tenderness. There is no rebound.   Musculoskeletal: He exhibits edema (1+ to 2+ bilateral).   Neurological: He is alert and oriented to person, place, and time.   Skin: Skin is warm, dry and intact. No erythema.   Psychiatric: He has a normal mood and affect.   Vitals reviewed.          Assessment:       Diagnosis Plan   1. Essential hypertension     2. Bilateral leg edema       Hypertension: Elevated today.  He reports that his blood pressure has been under better control.  I think today's elevation indicates that there is some room for improvement.  I am concerned that losartan may not be as effective as it had been.  Going to change him over to Lotensin.  I do not think a beta-blocker is a choice of drugs for him in view of his pulmonary issue.  There is also some reports in the literature " concerning hepatitis type picture with continue losartan.  I am also going to prescribe Aldactone for him in the hopes of improving his lower extremity edema.    I reviewed his echocardiogram.  I do not think the right-sided chambers are completely well identified.  I do not however find an enlarged liver or elevated jugular veins.  I do not think at this point that he has right-sided heart failure.    Going to discuss with Dr. Hernandes his thoughts.  He will follow back up in 4 to 6 weeks.         Plan:         Answers for HPI/ROS submitted by the patient on 6/22/2020   Hypertension  What is the primary reason for your visit?: High Blood Pressure  Chronicity: recurrent  Onset: more than 1 year ago  Progression since onset: waxing and waning  Condition status: resistant  anxiety: No  peripheral edema: Yes  sweats: No  Agents associated with hypertension: no associated agents  CAD risks: obesity  Compliance problems: no compliance problems

## 2020-07-07 NOTE — TELEPHONE ENCOUNTER
Patient's wife, Bonita, calling.    They request that the patient reschedule lab appt only on 7/9.  Would like to reschedule 1-1:30 if possible.    He has another doctor appt at 2:45, and if he can get lab changed, he can make the appointment with Dr. Gonzalez and Dr. Jansen that day.    [phone]

## 2020-07-08 NOTE — PROGRESS NOTES
Subjective Discussed course to date, no acute symptomatology    REASON FOR CONSULTATION: Apparent hepatocellular carcinoma    Provide an opinion on any further workup or treatment                             REQUESTING PHYSICIAN:  Regino Talavera MD    RECORDS OBTAINED:  Records of the patients history including those obtained from the referring provider were reviewed and summarized in detail.    HISTORY OF PRESENT ILLNESS:  The patient is a 74 y.o. year old male who is here for an opinion about the above issue.    History of Present Illness          The patient is a 74-year-old male with a history of hypertension, B12 deficiency, elevated hemidiaphragm with previous assessment by pulmonary medicine with initial review by cardiology in May 2016 for dyspnea and also unspecified hepatic cirrhosis.  Assessments had included CT of chest May 26, 2016 with significant elevation of left hemidiaphragm at the level of the love with mass-effect on the mediastinum to the right, compressive atelectatic change left lower lobe and lingula, pulmonary arteries enlarged with the main pulmonary artery measuring 3.5 cm, ectasia of the ascending thoracic aorta measuring 4.2 cm and evidence of a cirrhotic liver with diffusely nodular and lobular liver contour with significant enlargement left hepatic lobe.  The spleen was not enlarged and there is no evidence of ascites though there was suprahepatic IVC dilation.  This cirrhosis has been assessed by CT-guided biopsy the liver in September 2016 pathology revealing patchy mild portal chronic inflammatory change without significant steatosis or lobular inflammation, trichrome and reticulin stains with mild periportal fibrosis, iron stains negative.     He had follow-up with primary care was seen in 2017 by neurosurgery when he developed focal weakness starting in June 2017 with facial left-sided focality noted, associated dizziness and fatigue.  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  An MRI of the brain was obtained MRI of the brain and revealed no definite acute or subacute intracranial abnormality, evidence of chronic small vessel disease, tortuosity of the intracranial arteries from chronic hypertension, mild effacement of the left 3rd cranial nerve as the etiology of ptosis and additional vascular impingement involving intracranial right optic nerve, bilateral mamillary bodies and possible proximal cisternal left 7th/8th cranial nerves of unknown clinical significance.  This was reviewed by neurosurgery with a large bone spur on the left side at L3-4 compressing the C3 and C4 nerves thus producing the phrenic nerve paralysis.  Is not felt that this would change with surgical intervention.  Neurologic assessment proceeded for possible myasthenia gravis with negative findings         The patient is next seen by GI medicine March 2, 2020 for elevated liver function tests and laboratory studies as well as additional radiologic studies were obtained.  CT scan of the abdomen demonstrated progression of cirrhotic liver morphology with more prominent varices, splenic size increased in size and 1.4 cm hyperenhancing left hepatic lobe lesion thought to represent a regenerating nodule?  Hepatocellular carcinoma, nonspecific increase in a small node involving the elvin hepatis and no change in left hemidiaphragmatic elevation.  Duplex studies portal circulation revealed no evidence of acute or chronic thrombosis normal flow directions.  Additional laboratory studies including ALBIN, alpha-1 antitrypsin, antimicrosomal antibodies and anti-smooth muscle were negative.  Celiac panel, ceruloplasmin, hepatitis profile negative though monoclonal antibodies were slightly elevated 23.2.  Additional markers for fetoprotein, CEA and CA 19-9 were normal.       CTP score of 5-class A, meld score of 13         A follow-up  CT scan of the abdomen performed June 26, 2020 revealed an increase in the size of the hyperenhancing 1.6 x 1.1 medial hepatic segment nodule now up to 1.8 cm suspicious for dysplastic nodule hepatocellular carcinoma, 1 cm hyperenhancing focus in lateral panic segment is indeterminant, stable nodes in the elvin hepatis and stable elevation of left hemidiaphragm.  A follow-up MRI shows cirrhotic appearance of the liver with a well defined lesion with a medial hepatic segment measuring 3.7 x 3.4.  There is a questionable lesion in the lateral hepatic  segment of the previous exam is not identified with no evidence of biliary dilatation and a 2 cm node elvin hepatis seen on the previous CT scan is not defined secondary to breathing artifact.     These findings are discussed with radiology July 2020 with the recommendation to pay closer attention to the CT scanning rather than to the MRI with the latter suggesting some degree of regeneration around the known increasing medial hepatic lesion.  As result the patient staging would be  [id number] MX by TNM staging and albumin-bilirubin (ALBl) score 0.67.       We were contacted about this patient prior to his visit July 9, 2020 as he had not been felt a candidate for surgery secondary to respiratory compromise and been advised to proceed with stereotactic radiation therapy to the liver lesion in question.    As he is seen with his wife July 9, 2020 they have just been assessed by Dr. Nadir Gonzalez at [facility] and advised to proceed with stereotactic radiation therapy which will likely proceed over the next 1 to 2 weeks.  Symptomatically the patient feels well having improved respiratorily with the use of additional respirator at home and diuretic therapy through his cardiologist.  Past Medical History:   Diagnosis Date   • CAD (coronary artery disease)    • Cirrhosis of liver without ascites (CMS/HCC)    • Cobalamin deficiency 6/15/2016   • COPD (chronic  obstructive pulmonary disease) (CMS/HCC)     paralyzed left diaphragm   • Elevated hemidiaphragm 6/15/2016   • Hypertension    • Lower extremity edema    • 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   • Vitamin B 12 deficiency         Past Surgical History:   Procedure Laterality Date   • CARDIAC CATHETERIZATION N/A 6/3/2016    Procedure: Coronary angiography;  Surgeon: Александр Man MD;  Location: [facility] CATH INVASIVE LOCATION;  Service:    • CARDIAC CATHETERIZATION N/A 6/3/2016    Procedure: Right and Left Heart Cath;  Surgeon: Александр Man MD;  Location: [facility] CATH INVASIVE LOCATION;  Service:    • CARDIAC CATHETERIZATION N/A 6/3/2016    Procedure: Left ventriculography;  Surgeon: Александр Man MD;  Location: [facility] CATH INVASIVE LOCATION;  Service:    • NOSE SURGERY          Current Outpatient Medications on File Prior to Visit   Medication Sig Dispense Refill   • benazepril (Lotensin) 20 MG tablet Take 1 tablet by mouth 2 (two) times a day. 60 tablet 1   • Cyanocobalamin 1000 MCG/ML kit Inject 1,000 mcg as directed every 3 (three) months.     • spironolactone (ALDACTONE) 25 MG tablet Take 1 tablet by mouth Daily. 30 tablet 1     No current facility-administered medications on file prior to visit.         ALLERGIES:  No Known Allergies     Social History     Socioeconomic History   • Marital status:      Spouse name: Not on file   • Number of children: 0   • Years of education: Not on file   • Highest education level: Not on file   Occupational History   • Occupation:    Tobacco Use   • Smoking status: Never Smoker   • Smokeless tobacco: Never Used   Substance and Sexual Activity   • Alcohol use: Yes     Comment: rare/caffeine use   • Drug use: No   • Sexual activity: Yes     Partners: Female        Family History   Problem Relation Age of Onset   • Heart attack Father    • Cancer Father    • Alcohol abuse Father    • Dementia Father    • Colon cancer Neg Hx    • Colon polyps Neg  Hx         Review of Systems   Constitutional: Positive for activity change, fatigue and unexpected weight change (In part related to diuresis).   HENT: Negative.    Respiratory: Positive for shortness of breath.    Cardiovascular: Positive for leg swelling.   Gastrointestinal: Negative.    Genitourinary: Negative.    Musculoskeletal: Positive for arthralgias.   Skin: Negative.    Neurological: Negative.    Psychiatric/Behavioral: Negative.         Objective     There were no vitals filed for this visit.  No flowsheet data found.    Physical Exam   Constitutional: He is oriented to person, place, and time. He appears well-developed and well-nourished.   HENT:   Head: Normocephalic and atraumatic.   Eyes: Pupils are equal, round, and reactive to light. Conjunctivae and EOM are normal.   Neck: Normal range of motion. Neck supple.   Cardiovascular: Normal rate, regular rhythm, normal heart sounds and intact distal pulses.   Pulmonary/Chest: Effort normal.   Absent breath sounds left lung base to apex   Abdominal: Soft. Bowel sounds are normal. He exhibits no distension and no mass. There is no tenderness. There is no guarding.   Musculoskeletal: Normal range of motion. He exhibits edema.   Neurological: He is alert and oriented to person, place, and time.   Skin: Skin is warm and dry.   Psychiatric: He has a normal mood and affect. His behavior is normal. Thought content normal.         RECENT LABS:  Hematology WBC   Date Value Ref Range Status   07/09/2020 4.45 3.40 - 10.80 10*3/mm3 Final   06/11/2020 3.90 3.40 - 10.80 10*3/mm3 Final     RBC   Date Value Ref Range Status   07/09/2020 4.10 (L) 4.14 - 5.80 10*6/mm3 Final   06/11/2020 4.16 4.14 - 5.80 10*6/mm3 Final     Hemoglobin   Date Value Ref Range Status   07/09/2020 13.9 13.0 - 17.7 g/dL Final     Hematocrit   Date Value Ref Range Status   07/09/2020 42.1 37.5 - 51.0 % Final     Platelets   Date Value Ref Range Status   07/09/2020 122 (L) 140 - 450 10*3/mm3 Final           Assessment/Plan        The patient is a 74-year-old male with a history of hypertension, B12 deficiency, 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  He has not had additional therapeutic intervention for his elevated hemidiaphragm up to this point though this is now felt to be an option.  He had recently been seen by GI medicine in March with elevated liver function tests and CT scan of the abdomen demonstrated progression of cirrhotic liver morphology with more prominent varices, splenic size and 1.4 cm hyperenhancing left hepatic lobe lesion that is been somewhat suspicious leading to laboratory studies negative including alpha-fetoprotein, CEA and CA 19-9 levels.     A follow-up CT scan of the abdomen in late June demonstrates an increase in hyperenhancing medial hepatic segment nodule with a 1 cm additional focus in the lateral hepatic segment.  A follow-up MRI demonstrates a cirrhotic appearance of the liver with a defined lesion in the medial hepatic segment measuring 3.7 x 3.4 with no other clear abnormalities present.  The patient has a LI- RAD category 5 lesion after discussions with radiology and has had assessments at [facility] surgical oncology.  He is not felt to be an operative candidate and not a transplant candidate at this point.        We have discussed in the interval proceeding to PET/CT and liquid biopsy to assess any other sites of potential metastasis and whether we might learn indirectly about the patient's lesion diagnostically and/or therapeutically.  He has now been offered stereotactic radiation therapy at [facility].  After local therapy if he demonstrates progression of disease systemic therapy options include sorafenib or regorfenib versus lapatinib versus a combination of atezolizumab plus bevacizumab as first-line and  second line nivolumab.    Plan:  *Today the patient undergo foundation liquid assessment, pending PT (INR), PTT, fibrinogen, AFP  *Plan to schedule PET/CT next week, pulmonary function tests  *Case discussed with thoracic surgery  *MD follow-up in 2 to 3 weeks during which time he will presumably proceed to and through stereotactic RT.  *Pending the above results he would undergo reassessment for his response to RT and be seen by thoracic surgery to consider diaphragmatic plication.

## 2020-07-14 NOTE — PROGRESS NOTES
Subjective Telephone conference today held as patient is just undergone evaluation by radiation therapy at [facility].    REASON FOR CONSULTATION: Apparent hepatocellular carcinoma    Provide an opinion on any further workup or treatment                             REQUESTING PHYSICIAN:  Regino Talavera MD    RECORDS OBTAINED:  Records of the patients history including those obtained from the referring provider were reviewed and summarized in detail.    HISTORY OF PRESENT ILLNESS:  The patient is a 74 y.o. year old male who is here for an opinion about the above issue.    History of Present Illness          The patient is a 74-year-old male with a history of hypertension, B12 deficiency, elevated hemidiaphragm with previous assessment by pulmonary medicine with initial review by cardiology in May 2016 for dyspnea and also unspecified hepatic cirrhosis.  Assessments had included CT of chest May 26, 2016 with significant elevation of left hemidiaphragm at the level of the love with mass-effect on the mediastinum to the right, compressive atelectatic change left lower lobe and lingula, pulmonary arteries enlarged with the main pulmonary artery measuring 3.5 cm, ectasia of the ascending thoracic aorta measuring 4.2 cm and evidence of a cirrhotic liver with diffusely nodular and lobular liver contour with significant enlargement left hepatic lobe.  The spleen was not enlarged and there is no evidence of ascites though there was suprahepatic IVC dilation.  This cirrhosis has been assessed by CT-guided biopsy the liver in September 2016 pathology revealing patchy mild portal chronic inflammatory change without significant steatosis or lobular inflammation, trichrome and reticulin stains with mild periportal fibrosis, iron stains negative.     He had follow-up with primary care was seen in 2017 by neurosurgery when he developed focal weakness starting in June 2017 with facial left-sided focality noted, associated  dizziness and fatigue.  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  An MRI of the brain was obtained MRI of the brain and revealed no definite acute or subacute intracranial abnormality, evidence of chronic small vessel disease, tortuosity of the intracranial arteries from chronic hypertension, mild effacement of the left 3rd cranial nerve as the etiology of ptosis and additional vascular impingement involving intracranial right optic nerve, bilateral mamillary bodies and possible proximal cisternal left 7th/8th cranial nerves of unknown clinical significance.  This was reviewed by neurosurgery with a large bone spur on the left side at L3-4 compressing the C3 and C4 nerves thus producing the phrenic nerve paralysis.  Is not felt that this would change with surgical intervention.  Neurologic assessment proceeded for possible myasthenia gravis with negative findings         The patient is next seen by GI medicine March 2, 2020 for elevated liver function tests and laboratory studies as well as additional radiologic studies were obtained.  CT scan of the abdomen demonstrated progression of cirrhotic liver morphology with more prominent varices, splenic size increased in size and 1.4 cm hyperenhancing left hepatic lobe lesion thought to represent a regenerating nodule?  Hepatocellular carcinoma, nonspecific increase in a small node involving the elvin hepatis and no change in left hemidiaphragmatic elevation.  Duplex studies portal circulation revealed no evidence of acute or chronic thrombosis normal flow directions.  Additional laboratory studies including ALBIN, alpha-1 antitrypsin, antimicrosomal antibodies and anti-smooth muscle were negative.  Celiac panel, ceruloplasmin, hepatitis profile negative though monoclonal antibodies were slightly elevated 23.2.  Additional markers for fetoprotein, CEA and CA 19-9 were  normal.       CTP score of 5-class A, meld score of 13         A follow-up CT scan of the abdomen performed June 26, 2020 revealed an increase in the size of the hyperenhancing 1.6 x 1.1 medial hepatic segment nodule now up to 1.8 cm suspicious for dysplastic nodule hepatocellular carcinoma, 1 cm hyperenhancing focus in lateral panic segment is indeterminant, stable nodes in the elvin hepatis and stable elevation of left hemidiaphragm.  A follow-up MRI shows cirrhotic appearance of the liver with a well defined lesion with a medial hepatic segment measuring 3.7 x 3.4.  There is a questionable lesion in the lateral hepatic  segment of the previous exam is not identified with no evidence of biliary dilatation and a 2 cm node elvin hepatis seen on the previous CT scan is not defined secondary to breathing artifact.     These findings are discussed with radiology July 2020 with the recommendation to pay closer attention to the CT scanning rather than to the MRI with the latter suggesting some degree of regeneration around the known increasing medial hepatic lesion.  As result the patient staging would be  [id number] MX by TNM staging and albumin-bilirubin (ALBl) score 0.67.       We were contacted about this patient prior to his visit July 9, 2020 as he had not been felt a candidate for surgery secondary to respiratory compromise and been advised to proceed with stereotactic radiation therapy to the liver lesion in question.    As he is seen with his wife July 9, 2020 they have just been assessed by Dr. Nadir Gonzalez at [facility] and advised to proceed with stereotactic radiation therapy which will likely proceed over the next 1 to 2 weeks.  Symptomatically the patient feels well having improved respiratorily with the use of additional respirator at home and diuretic therapy through his cardiologist.    It was elected to have him undergo a PET/CT examination and liquid biopsy as well as additional laboratory  studies.  The studies have included a normal pro time with an INR of 1.09, PTT of 35.6, normal fibrinogen, normal AFP, transaminases with ALT of 46 AST of 75, alk phos of 163 and total bilirubin of 1.6.  Further the patient's PET/CT fails to show any hypermetabolic liver lesion and no hypermetabolic lymphadenopathy within the abdomen though this considers the fact that hepatocellular carcinoma typically has low FDG avidity.  There is no other suspicious activity in the neck, chest, abdomen or pelvis.  The patient is contacted July 21 by telephone having just been seen by Dr. Ian Sandoval radiation therapy at [facility].  The patient is not a candidate for stereotactic radiation given over approximately a week's time with treatment planning scheduled within the next week.  Is agreeable that a follow-up MRI examination to compare to previous would be done a month after treatment is completed.  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   • Vitamin B 12 deficiency         Past Surgical History:   Procedure Laterality Date   • CARDIAC CATHETERIZATION N/A 6/3/2016    Procedure: Coronary angiography;  Surgeon: Александр Man MD;  Location: [facility] INVASIVE LOCATION;  Service:    • CARDIAC CATHETERIZATION N/A 6/3/2016    Procedure: Right and Left Heart Cath;  Surgeon: Александр Man MD;  Location: [facility] INVASIVE LOCATION;  Service:    • CARDIAC CATHETERIZATION N/A 6/3/2016    Procedure: Left ventriculography;  Surgeon: Александр Man MD;  Location: [facility] INVASIVE LOCATION;  Service:    • NOSE SURGERY          Current Outpatient Medications on File Prior to Visit    Medication Sig Dispense Refill   • benazepril (Lotensin) 20 MG tablet Take 1 tablet by mouth 2 (two) times a day. 60 tablet 1   • Cyanocobalamin 1000 MCG/ML kit Inject 1,000 mcg as directed every 3 (three) months.     • spironolactone (ALDACTONE) 25 MG tablet Take 1 tablet by mouth Daily. 30 tablet 1     No current facility-administered medications on file prior to visit.         ALLERGIES:  No Known Allergies     Social History     Socioeconomic History   • Marital status:      Spouse name: Not on file   • Number of children: 0   • Years of education: Not on file   • Highest education level: Not on file   Occupational History   • Occupation:    Tobacco Use   • Smoking status: Never Smoker   • Smokeless tobacco: Never Used   Substance and Sexual Activity   • Alcohol use: Yes     Comment: rare/caffeine use   • Drug use: No   • Sexual activity: Yes     Partners: Female        Family History   Problem Relation Age of Onset   • Heart attack Father    • Cancer Father    • Alcohol abuse Father    • Dementia Father    • Colon cancer Neg Hx    • Colon polyps Neg Hx       Review of systems unchanged from previous assessed July 21, 2020  Review of Systems   Constitutional: Positive for activity change, fatigue and unexpected weight change (In part related to diuresis).   HENT: Negative.    Respiratory: Positive for shortness of breath.    Cardiovascular: Positive for leg swelling.   Gastrointestinal: Negative.    Genitourinary: Negative.    Musculoskeletal: Positive for arthralgias.   Skin: Negative.    Neurological: Negative.    Psychiatric/Behavioral: Negative.         Objective     There were no vitals filed for this visit.  No flowsheet data found.  Physical exam not performed, below is from July 9, 2020  Physical Exam   Constitutional: He is oriented to person, place, and time. He appears well-developed and well-nourished.   HENT:   Head: Normocephalic and atraumatic.   Eyes: Pupils are equal,  round, and reactive to light. Conjunctivae and EOM are normal.   Neck: Normal range of motion. Neck supple.   Cardiovascular: Normal rate, regular rhythm, normal heart sounds and intact distal pulses.   Pulmonary/Chest: Effort normal.   Absent breath sounds left lung base to apex   Abdominal: Soft. Bowel sounds are normal. He exhibits no distension and no mass. There is no tenderness. There is no guarding.   Musculoskeletal: Normal range of motion. He exhibits edema.   Neurological: He is alert and oriented to person, place, and time.   Skin: Skin is warm and dry.   Psychiatric: He has a normal mood and affect. His behavior is normal. Thought content normal.         RECENT LABS:  Hematology WBC   Date Value Ref Range Status   07/09/2020 4.45 3.40 - 10.80 10*3/mm3 Final   06/11/2020 3.90 3.40 - 10.80 10*3/mm3 Final     RBC   Date Value Ref Range Status   07/09/2020 4.10 (L) 4.14 - 5.80 10*6/mm3 Final   06/11/2020 4.16 4.14 - 5.80 10*6/mm3 Final     Hemoglobin   Date Value Ref Range Status   07/09/2020 13.9 13.0 - 17.7 g/dL Final     Hematocrit   Date Value Ref Range Status   07/09/2020 42.1 37.5 - 51.0 % Final     Platelets   Date Value Ref Range Status   07/09/2020 122 (L) 140 - 450 10*3/mm3 Final          Assessment/Plan        The patient is a 74-year-old male with a history of hypertension, B12 deficiency, 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  He has not had additional therapeutic intervention for his elevated hemidiaphragm up to this point though this is now felt to be an option.  He had recently been seen by GI medicine in March with elevated liver function tests and CT scan of the abdomen demonstrated progression of cirrhotic liver morphology with more prominent varices, splenic size and 1.4 cm hyperenhancing left hepatic lobe lesion that is been somewhat suspicious leading  to laboratory studies negative including alpha-fetoprotein, CEA and CA 19-9 levels.     A follow-up CT scan of the abdomen in late June demonstrates an increase in hyperenhancing medial hepatic segment nodule with a 1 cm additional focus in the lateral hepatic segment.  A follow-up MRI demonstrates a cirrhotic appearance of the liver with a defined lesion in the medial hepatic segment measuring 3.7 x 3.4 with no other clear abnormalities present.  The patient has a LI- RAD category 5 lesion after discussions with radiology and has had assessments at [facility] surgical oncology.  He is not felt to be an operative candidate and not a transplant candidate at this point.        We have discussed in the interval proceeding to PET/CT and liquid biopsy to assess any other sites of potential metastasis and whether we might learn indirectly about the patient's lesion diagnostically and/or therapeutically.  He has now been offered stereotactic radiation therapy at [facility].  After local therapy if he demonstrates progression of disease systemic therapy options include sorafenib or regorfenib versus lapatinib versus a combination of atezolizumab plus bevacizumab as first-line and second line nivolumab.  The patient was further assessed with foundation liquid which is currently pending at the time of this dictation when the patient is contacted by telephone July 21, 2020.  Additional laboratory studies were otherwise unremarkable except mild elevation of transaminases.  At this point the patient is been assessed by radiation therapy at [facility] and is a candidate for stereotactic radiation within the next 2 weeks.  It is agreed that this would proceed.  Plan also to review the patient's pulmonary function testing as we try to determine whether he will proceed with diaphragmatic plication at a later point.  After discussion plan:          *Patient proceed with radiation therapy as scheduled  through Dr. Ian Sandoval  *We will schedule patient be seen back in 4 to 5 weeks with plans to repeat evaluate by subsequent MRI approximately month after his radiation therapy is completed  *Pending the above results he would undergo reassessment for his response to RT and be seen by thoracic surgery to consider diaphragmatic plication.  *The patient be contacted concerning the liquid biopsy results as they become available as well  *The patient and his wife are agreeable with this plan and follow-up    You have chosen to receive care through a telephone visit today. Do you consent to use a telephone visit for your medical care today? Yes     This visit has been rescheduled as a phone visit to comply with patient safety concerns in accordance with CDC recommendations. Total time of discussion was 15 minutes.    44300 is 5-10 minutes  37781 is 11-20 minutes  72747 is 21 or more minutes

## 2020-07-21 PROBLEM — C22.0 HEPATOCELLULAR CARCINOMA (HCC): Status: ACTIVE | Noted: 2020-01-01

## 2020-07-26 NOTE — PROGRESS NOTES
Contacted Dr. Christian concerning this patient's foundation liquid assessment which was, essentially, negative.  MSI was also indeterminate as were the lack of findings of pertinent genomic drivers.

## 2020-07-28 PROBLEM — I10 ESSENTIAL HYPERTENSION: Status: ACTIVE | Noted: 2020-01-01

## 2020-07-28 NOTE — PROGRESS NOTES
Date of Office Visit: 2020    Patient Name: Harris Christian  : 1945    Encounter Provider: Александр Man MD  Referring Provider: Александр Man MD  Place of Service: [facility] CARDIOLOGY  Patient Care Team:  Provider, No Known as PCP - General  Fam Talavera MD as Consulting Physician (Gastroenterology)  Fam Talavera MD as Referring Physician (Gastroenterology)  Ephraim aJnsen MD as Consulting Physician (Hematology and Oncology)      Chief Complaint   Patient presents with   • Hypertension     History of Present Illness  Patient is a 74-year-old white male who I saw him a month ago because of progressive lower extremity edema and hypertension.  His medications were altered.  He is now on spironolactone.  He has lost 11 pounds since his last visit and feeling better.  His edema has improved significantly.  He does not have any major complaints at the present time.  He does report his blood pressure under better control in fact today's pressure little lower than normal.  Generally he is in the 120-130 range systolic      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   • Hepatocellular carcinoma (CMS/HCC) 2020   • Hypertension    • Lower extremity edema    • Phrenic nerve palsy 2017   • Vitamin B 12 deficiency          Past Surgical History:   Procedure Laterality Date   • CARDIAC CATHETERIZATION N/A 6/3/2016    Procedure: Coronary angiography;  Surgeon: Александр Man MD;  Location: [facility] INVASIVE LOCATION;  Service:    • CARDIAC CATHETERIZATION N/A 6/3/2016    Procedure: Right and Left Heart Cath;  Surgeon: Александр Man MD;  Location: [facility] INVASIVE LOCATION;   "Service:    • CARDIAC CATHETERIZATION N/A 6/3/2016    Procedure: Left ventriculography;  Surgeon: Александр Man MD;  Location: [facility] INVAS[facility] LOC[facility];  Service:    • NOSE SURGERY             Current Outpatient Medications:   •  benazepril (Lotensin) 20 MG tablet, Take 1 tablet by mouth 2 (two) times a day., Disp: 60 tablet, Rfl: 1  •  Cyanocobalamin 1000 MCG/ML kit, Inject 1,000 mcg as directed every 3 (three) months., Disp: , Rfl:   •  spironolactone (ALDACTONE) 25 MG tablet, Take 1 tablet by mouth Daily., Disp: 30 tablet, Rfl: 1      Social History     Socioeconomic History   • Marital status:      Spouse name: Jan   • Number of children: 0   • Years of education: Not on file   • Highest education level: Not on file   Occupational History   • Occupation:      Employer: [employer]   Tobacco Use   • Smoking status: Never Smoker   • Smokeless tobacco: Never Used   Substance and Sexual Activity   • Alcohol use: Yes     Comment: rare/  Daily caffeine use   • Drug use: No   • Sexual activity: Yes     Partners: Female         Review of Systems   Constitution: Negative.   HENT: Negative.    Eyes: Negative.    Cardiovascular: Positive for leg swelling (Improved).   Respiratory: Negative.    Endocrine: Negative.    Skin: Negative.    Musculoskeletal: Negative.    Gastrointestinal: Negative.    Neurological: Negative.    Psychiatric/Behavioral: Negative.        Procedures      ECG 12 Lead  Date/Time: 7/28/2020 10:04 AM  Performed by: Александр Man MD  Authorized by: Александр Man MD   Comparison: compared with previous ECG   Comparison to previous ECG: PVCs no longer present.  Rhythm: sinus rhythm  Rate: normal  Conduction: 1st degree AV block  QRS axis: normal                  Objective:    /60   Pulse 82   Ht 180.3 cm (71\")   Wt 93.9 kg (207 lb)   BMI 28.87 kg/m²         Physical Exam   Constitutional: He is oriented to person, place, and time. He appears " well-developed and well-nourished.   HENT:   Head: Normocephalic.   Eyes: Pupils are equal, round, and reactive to light.   Neck: Normal range of motion. No JVD present. Carotid bruit is not present. No thyromegaly present.   Cardiovascular: Normal rate, regular rhythm, S1 normal, S2 normal, normal heart sounds and intact distal pulses. Exam reveals no gallop and no friction rub.   No murmur heard.  Pulmonary/Chest: Effort normal and breath sounds normal.   Abdominal: Soft. Bowel sounds are normal.   Musculoskeletal: He exhibits edema (Trace).   Neurological: He is alert and oriented to person, place, and time.   Skin: Skin is warm, dry and intact. No erythema.   Psychiatric: He has a normal mood and affect.   Vitals reviewed.          Assessment:       Diagnosis Plan   1. Essential hypertension       Overall the patient appears to be improved on the spironolactone.  We will continue the same at this time.  I will have him come back in the next year or so unless there is a concern.       Plan:

## 2020-08-18 NOTE — PROGRESS NOTES
Subjective Patient feeling fairly well, discussed recent stereotactic therapy for hepatocellular carcinoma.      REASON FOR FOLLOW-UP: Hepatocellular carcinoma        History of Present Illness          The patient is a 74-year-old male with a history of hypertension, B12 deficiency, elevated hemidiaphragm with previous assessment by pulmonary medicine with initial review by cardiology in May 2016 for dyspnea and also unspecified hepatic cirrhosis.  Assessments had included CT of chest May 26, 2016 with significant elevation of left hemidiaphragm at the level of the love with mass-effect on the mediastinum to the right, compressive atelectatic change left lower lobe and lingula, pulmonary arteries enlarged with the main pulmonary artery measuring 3.5 cm, ectasia of the ascending thoracic aorta measuring 4.2 cm and evidence of a cirrhotic liver with diffusely nodular and lobular liver contour with significant enlargement left hepatic lobe.  The spleen was not enlarged and there is no evidence of ascites though there was suprahepatic IVC dilation.  This cirrhosis has been assessed by CT-guided biopsy the liver in September 2016 pathology revealing patchy mild portal chronic inflammatory change without significant steatosis or lobular inflammation, trichrome and reticulin stains with mild periportal fibrosis, iron stains negative.     He had follow-up with primary care was seen in 2017 by neurosurgery when he developed focal weakness starting in June 2017 with facial left-sided focality noted, associated dizziness and fatigue.  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  An MRI of the brain was obtained MRI of the brain and revealed no definite acute or subacute intracranial abnormality, evidence of chronic small vessel disease, tortuosity of the intracranial arteries from chronic hypertension, mild effacement of the left  3rd cranial nerve as the etiology of ptosis and additional vascular impingement involving intracranial right optic nerve, bilateral mamillary bodies and possible proximal cisternal left 7th/8th cranial nerves of unknown clinical significance.  This was reviewed by neurosurgery with a large bone spur on the left side at L3-4 compressing the C3 and C4 nerves thus producing the phrenic nerve paralysis.  Is not felt that this would change with surgical intervention.  Neurologic assessment proceeded for possible myasthenia gravis with negative findings         The patient is next seen by GI medicine March 2, 2020 for elevated liver function tests and laboratory studies as well as additional radiologic studies were obtained.  CT scan of the abdomen demonstrated progression of cirrhotic liver morphology with more prominent varices, splenic size increased in size and 1.4 cm hyperenhancing left hepatic lobe lesion thought to represent a regenerating nodule?  Hepatocellular carcinoma, nonspecific increase in a small node involving the elvin hepatis and no change in left hemidiaphragmatic elevation.  Duplex studies portal circulation revealed no evidence of acute or chronic thrombosis normal flow directions.  Additional laboratory studies including ALBIN, alpha-1 antitrypsin, antimicrosomal antibodies and anti-smooth muscle were negative.  Celiac panel, ceruloplasmin, hepatitis profile negative though monoclonal antibodies were slightly elevated 23.2.  Additional markers for fetoprotein, CEA and CA 19-9 were normal.       CTP score of 5-class A, meld score of 13         A follow-up CT scan of the abdomen performed June 26, 2020 revealed an increase in the size of the hyperenhancing 1.6 x 1.1 medial hepatic segment nodule now up to 1.8 cm suspicious for dysplastic nodule hepatocellular carcinoma, 1 cm hyperenhancing focus in lateral panic segment is indeterminant, stable nodes in the elvin hepatis and stable elevation of left  hemidiaphragm.  A follow-up MRI shows cirrhotic appearance of the liver with a well defined lesion with a medial hepatic segment measuring 3.7 x 3.4.  There is a questionable lesion in the lateral hepatic  segment of the previous exam is not identified with no evidence of biliary dilatation and a 2 cm node elvin hepatis seen on the previous CT scan is not defined secondary to breathing artifact.     These findings are discussed with radiology July 2020 with the recommendation to pay closer attention to the CT scanning rather than to the MRI with the latter suggesting some degree of regeneration around the known increasing medial hepatic lesion.  As result the patient staging would be  [id number] MX by TNM staging and albumin-bilirubin (ALBl) score 0.67.       We were contacted about this patient prior to his visit July 9, 2020 as he had not been felt a candidate for surgery secondary to respiratory compromise and been advised to proceed with stereotactic radiation therapy to the liver lesion in question.    As he is seen with his wife July 9, 2020 they have just been assessed by Dr. Nadir Gonzalez at [facility] and advised to proceed with stereotactic radiation therapy which will likely proceed over the next 1 to 2 weeks.  Symptomatically the patient feels well having improved respiratorily with the use of additional respirator at home and diuretic therapy through his cardiologist.    It was elected to have him undergo a PET/CT examination and liquid biopsy as well as additional laboratory studies.  The studies have included a normal pro time with an INR of 1.09, PTT of 35.6, normal fibrinogen, normal AFP, transaminases with ALT of 46 AST of 75, alk phos of 163 and total bilirubin of 1.6.  Further the patient's PET/CT fails to show any hypermetabolic liver lesion and no hypermetabolic lymphadenopathy within the abdomen though this considers the fact that hepatocellular carcinoma typically has low FDG  avidity.  There is no other suspicious activity in the neck, chest, abdomen or pelvis.  The patient is contacted July 21 by telephone having just been seen by Dr. Ian Sandoval radiation therapy at [facility].  The patient is not a candidate for stereotactic radiation given over approximately a week's time with treatment planning scheduled within the next week.  Is agreeable that a follow-up MRI examination to compare to previous would be done a month after treatment is completed.     Patient had follow-up visits with cardiology August 28, 2020 improving on spironolactone.  He was seen by GI medicine August 18, 2020 having recently proceeded through radiation therapy with some degree of fatigue.  Patient Chase he was treated, August 11 and August 13, 2020.  Plans were made for EGD for evaluation of varices and colonoscopy for screening and plans to reinstitute surveillance of cirrhosis without fetoprotein imaging every 6 months.  Patient seen in office August 25, 2020 with pain surrounding the hepatic capsule that developed to start radiation therapy, now resolved, stable weight and appetite and generally good performance status.  He is to have a follow-up MRI in mid October, reviewed by radiation therapy and reassessment here shortly thereafter as discussed.  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   • Vitamin B 12 deficiency         Past Surgical History:   Procedure Laterality Date   • CARDIAC CATHETERIZATION N/A 6/3/2016    Procedure: Coronary angiography;  Surgeon: Александр Man MD;  Location: [facility] INVASIVE LOCATION;   Service:    • CARDIAC CATHETERIZATION N/A 6/3/2016    Procedure: Right and Left Heart Cath;  Surgeon: Александр Man MD;  Location:  [facility] INVAS[facility] LOCATION;  Service:    • CARDIAC CATHETERIZATION N/A 6/3/2016    Procedure: Left ventriculography;  Surgeon: Александр Man MD;  Location:  [facility] INVASIVE LOCATION;  Service:    • NOSE SURGERY          Current Outpatient Medications on File Prior to Visit   Medication Sig Dispense Refill   • benazepril (Lotensin) 20 MG tablet Take 1 tablet by mouth 2 (two) times a day. 180 tablet 1   • Cyanocobalamin 1000 MCG/ML kit Inject 1,000 mcg as directed every 3 (three) months.     • spironolactone (ALDACTONE) 25 MG tablet Take 1 tablet by mouth Daily. 90 tablet 1     No current facility-administered medications on file prior to visit.         ALLERGIES:  No Known Allergies     Social History     Socioeconomic History   • Marital status:      Spouse name: Jan   • Number of children: 0   • Years of education: Not on file   • Highest education level: Not on file   Occupational History   • Occupation:      Employer: [employer]   Tobacco Use   • Smoking status: Never Smoker   • Smokeless tobacco: Never Used   Substance and Sexual Activity   • Alcohol use: Yes     Comment: rare/  Daily caffeine use   • Drug use: No   • Sexual activity: Yes     Partners: Female        Family History   Problem Relation Age of Onset   • Heart attack Father    • Cancer Father    • Alcohol abuse Father    • Dementia Father    • Colon cancer Neg Hx    • Colon polyps Neg Hx       Review of systems unchanged from previous assessed July 21, 2020  Review of Systems   Constitutional: Positive for activity change, fatigue and unexpected weight change (In part related to diuresis).   HENT: Negative.    Respiratory: Positive for shortness of breath.    Cardiovascular: Positive for leg swelling.   Gastrointestinal: Negative.    Genitourinary: Negative.   "  Musculoskeletal: Positive for arthralgias.   Skin: Negative.    Neurological: Negative.    Psychiatric/Behavioral: Negative.         Objective     Vitals:    08/25/20 1058   BP: 119/71   Pulse: 79   Resp: 16   Temp: 97.1 °F (36.2 °C)   TempSrc: Temporal   SpO2: 97%   Weight: 93.6 kg (206 lb 6.4 oz)   Height: 180.3 cm (70.98\")   PainSc: 0-No pain     Current Status 8/25/2020   ECOG score 0     Physical exam not performed, below is from July 9, 2020  Physical Exam   Constitutional: He is oriented to person, place, and time. He appears well-developed and well-nourished.   HENT:   Head: Normocephalic and atraumatic.   Eyes: Pupils are equal, round, and reactive to light. Conjunctivae and EOM are normal.   Neck: Normal range of motion. Neck supple.   Cardiovascular: Normal rate, regular rhythm, normal heart sounds and intact distal pulses.   Pulmonary/Chest: Effort normal.   Absent breath sounds left lung base to apex   Abdominal: Soft. Bowel sounds are normal. He exhibits no distension and no mass. There is no tenderness. There is no guarding.   Musculoskeletal: Normal range of motion. He exhibits edema.   Improved edema as compared to previous   Neurological: He is alert and oriented to person, place, and time.   Skin: Skin is warm and dry.   Psychiatric: He has a normal mood and affect. His behavior is normal. Thought content normal.         RECENT LABS:  Hematology WBC   Date Value Ref Range Status   08/25/2020 4.38 3.40 - 10.80 10*3/mm3 Final   06/11/2020 3.90 3.40 - 10.80 10*3/mm3 Final     RBC   Date Value Ref Range Status   08/25/2020 4.09 (L) 4.14 - 5.80 10*6/mm3 Final   06/11/2020 4.16 4.14 - 5.80 10*6/mm3 Final     Hemoglobin   Date Value Ref Range Status   08/25/2020 13.8 13.0 - 17.7 g/dL Final     Hematocrit   Date Value Ref Range Status   08/25/2020 41.1 37.5 - 51.0 % Final     Platelets   Date Value Ref Range Status   08/25/2020 126 (L) 140 - 450 10*3/mm3 Final      F-18 FDG PET FROM SKULL BASE TO MID " THIGH WITH PET/CT FUSION 7/16/2020    FINDINGS: The liver has a heterogeneous speckled pattern of activity.  There is no hypermetabolic activity corresponding to the approximately  3.7 x 3.4 cm mass at the medial hepatic segment seen on the recent MRI.  There is no hypermetabolic lymphadenopathy within the abdomen. There is  no suspicious hypermetabolic activity within the abdomen or pelvis.  There is no suspicious hypermetabolic activity within the chest or neck.     IMPRESSION:  1. There is no hypermetabolic liver lesion and there is no  hypermetabolic lymphadenopathy within the abdomen. Hepatocellular  carcinoma typically has low FDG avidity and the lesion at the medial  hepatic segment has low FDG avidity.  2. There is no suspicious hypermetabolic activity within the neck,  chest, abdomen, or pelvis.     This report was finalized on 7/17/2020 3:10 PM by Dr. Rosemarie Dominguez M.D.    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  He has not had additional therapeutic intervention for his elevated hemidiaphragm up to this point though this is now felt to be an option.  He had recently been seen by GI medicine in March with elevated liver function tests and CT scan of the abdomen demonstrated progression of cirrhotic liver morphology with more prominent varices, splenic size and 1.4 cm hyperenhancing left hepatic lobe lesion that is been somewhat suspicious leading to laboratory studies negative including alpha-fetoprotein, CEA and CA 19-9 levels.     A follow-up CT scan of the abdomen in late June demonstrates an increase in hyperenhancing medial hepatic segment nodule with a 1 cm additional focus in the lateral hepatic segment.  A follow-up MRI demonstrates a cirrhotic appearance of the  liver with a defined lesion in the medial hepatic segment measuring 3.7 x 3.4 with no other clear abnormalities present.  The patient has a LI- RAD category 5 lesion after discussions with radiology and has had assessments at [facility] surgical oncology.  He is not felt to be an operative candidate and not a transplant candidate at this point.        We have discussed in the interval proceeding to PET/CT and liquid biopsy to assess any other sites of potential metastasis and whether we might learn indirectly about the patient's lesion diagnostically and/or therapeutically.  He has now been offered stereotactic radiation therapy at [facility].  After local therapy if he demonstrates progression of disease systemic therapy options include sorafenib or regorfenib versus lapatinib versus a combination of atezolizumab plus bevacizumab as first-line and second line nivolumab.  The patient was further assessed with foundation liquid which is currently pending at the time of this dictation when the patient is contacted by telephone July 21, 2020.  Additional laboratory studies were otherwise unremarkable except mild elevation of transaminases.  At this point the patient is been assessed by radiation therapy at [facility] and is a candidate for stereotactic radiation within the next 2 weeks.  It is agreed that this would proceed.  Plan also to review the patient's pulmonary function testing as we try to determine whether he will proceed with diaphragmatic plication at a later point.  After discussion it was agreed the patient will continue with radiation therapy plan to Dr. Ian Sandoval, follow-up with MRI after his procedure and pains results again reassess for thoracic surgery to consider diaphragmatic plication.   Fortunately as he is assessed August 25 he is recovering from the acute effects of stereotactic radiation therapy and has an excellent performance status overall.        Plan:    *Discussion held with patient and, additionally, wife by telephone.    *Continue current medications including spironolactone.    *Patient return to laboratory for alpha-fetoprotein testing, repeat liver function test not available generally stable though with anticipated increase per alkaline phosphatase.    *MRI of the abdomen now scheduled October 15    *Patient is follow-up with Dr. Sandoval shortly thereafter and we will see the patient also at that point.    *Pending those results there is a question of fundoplication or not as an additional option.

## 2020-08-18 NOTE — PROGRESS NOTES
Hepatic Disease (elevated lft)      HPI  Here for follow-up of liver disease.  He carries a diagnosis of hypertension B12 deficiency and chronic elevated hemidiaphragm which was the result of a phrenic nerve injury.  Cirrhosis on liver biopsy 2016 CT scan earlier this year showed cirrhotic morphology varices splenomegaly and a 1.4 cm hyperenhancing left hepatic lobe lesion CEA and CA 19 9 and AFP negative repeat imaging showed an increase in the nodule with an additional focus follow-up MRI showing a defined lesion in the medial hepatic segment of 3.7 x 3.4 he has been seen by Dr. Jansen and at the [facility] surgical oncology department not undergoing surgery but was proceeding with PET/CT and liquid biopsy and is undergoing stereotactic radiation therapy at the [facility].  The patient's foundation liquid assessment was essentially negative MSI was also indeterminant and a lack of findings of pertinent genomic drivers    Recently seen by Dr. Man for essential hypertension which has improved with spironolactone    Discussed with the patient's wife who is a physician here at [facility] the patient has had XRT for the last 2 weeks.  He has been having some fatigue.  He has been on spironolactone with an improvement in edema.  Follow-up imaging is planned of the liver after treatment to see of any improvement or changes    Due to his pulmonary status and breathing issues patient has not had EGD for evaluation of varices or colonoscopy for colon cancer screening    Patient reports overall he has been feeling better. He reports he is breathing more easily. He completed radiation therapy last Thursday with plans to re-image liver mass in around 1 month and have lab work next week.    There has been discussion about surgically repairing his diaphragm after treatment for HCC is completed.    He reports good appetite. He is not drinking any alcohol.    He denies abdominal pain, denies  GERD, denies dysphagia, denies bowel changes. Denies rectal bleeding.    Review of Systems   Constitutional: Negative for appetite change, chills, diaphoresis, fatigue, fever and unexpected weight change.   HENT: Negative for dental problem, ear pain, mouth sores, rhinorrhea, sore throat and voice change.    Eyes: Negative for pain, redness and visual disturbance.   Respiratory: Negative for cough, chest tightness and wheezing.    Cardiovascular: Negative for chest pain, palpitations and leg swelling.   Endocrine: Negative for cold intolerance, heat intolerance, polydipsia, polyphagia and polyuria.   Genitourinary: Negative for dysuria, frequency, hematuria and urgency.   Musculoskeletal: Negative for arthralgias, back pain, joint swelling, myalgias and neck pain.   Skin: Negative for rash.   Allergic/Immunologic: Negative for environmental allergies, food allergies and immunocompromised state.   Neurological: Negative for dizziness, seizures, weakness, numbness and headaches.   Hematological: Does not bruise/bleed easily.   Psychiatric/Behavioral: Negative for sleep disturbance. The patient is not nervous/anxious.         I have reviewed and confirmed the accuracy of the HPI and ROS as documented by the APRN ALICIA Romero     Problem List:    Patient Active Problem List   Diagnosis   • Elevated hemidiaphragm   • Cobalamin deficiency   • Dizziness   • Phrenic nerve palsy   • Hepatocellular carcinoma (CMS/HCC)   • Essential hypertension       Medical History:    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   • Vitamin B 12 deficiency         Social  History:    Social History     Socioeconomic History   • Marital status:      Spouse name: Jan   • Number of children: 0   • Years of education: Not on file   • Highest education level: Not on file   Occupational History   • Occupation:      Employer: [employer]   Tobacco Use   • Smoking status: Never Smoker   • Smokeless tobacco: Never Used   Substance and Sexual Activity   • Alcohol use: Yes     Comment: rare/  Daily caffeine use   • Drug use: No   • Sexual activity: Yes     Partners: Female       Family History:   Family History   Problem Relation Age of Onset   • Heart attack Father    • Cancer Father    • Alcohol abuse Father    • Dementia Father    • Colon cancer Neg Hx    • Colon polyps Neg Hx        Surgical History:   Past Surgical History:   Procedure Laterality Date   • CARDIAC CATHETERIZATION N/A 6/3/2016    Procedure: Coronary angiography;  Surgeon: Александр Man MD;  Location: [facility] CATH INVASIVE LOCATION;  Service:    • CARDIAC CATHETERIZATION N/A 6/3/2016    Procedure: Right and Left Heart Cath;  Surgeon: Александр Man MD;  Location: [facility] CATH INVASIVE LOCATION;  Service:    • CARDIAC CATHETERIZATION N/A 6/3/2016    Procedure: Left ventriculography;  Surgeon: Александр Man MD;  Location: [facility] CATH INVASIVE LOCATION;  Service:    • NOSE SURGERY           Current Outpatient Medications:   •  benazepril (Lotensin) 20 MG tablet, Take 1 tablet by mouth 2 (two) times a day., Disp: 180 tablet, Rfl: 1  •  Cyanocobalamin 1000 MCG/ML kit, Inject 1,000 mcg as directed every 3 (three) months., Disp: , Rfl:   •  spironolactone (ALDACTONE) 25 MG tablet, Take 1 tablet by mouth Daily., Disp: 90 tablet, Rfl: 1    Allergies: No Known Allergies     The following portions of the patient's history were reviewed and updated as appropriate: allergies, current medications, past family history, past medical history, past social history, past surgical history and problem  list.    Vitals:    08/18/20 1310   BP: 128/82   Pulse: 100   Resp: 16   Temp: 97.3 °F (36.3 °C)   SpO2: 96%         08/18/20  1310   Weight: 91.6 kg (201 lb 14.4 oz)     Body mass index is 28.16 kg/m².      Physical Exam   Abdominal: Soft. Normal appearance and bowel sounds are normal. He exhibits no distension, no pulsatile midline mass and no mass. There is no tenderness. There is no rigidity, no rebound and no guarding.   Vitals reviewed.   chest mild dec over L hemithorax    Assessment/ Plan  Harris was seen today for hepatic disease.    Diagnoses and all orders for this visit:    Hepatocellular carcinoma (CMS/HCC)    Hepatic cirrhosis, unspecified hepatic cirrhosis type, unspecified whether ascites present (CMS/HCC)         No follow-ups on file.    There are no Patient Instructions on file for this visit.      Discussion:  Discussed with patient today that I would recommend screening EGD to evaluate for varices and colonoscopy for colon cancer screening, however I feel his HCC treatment needs to be followed up on first and pulmonary issues need to be addressed first to minimize risk.    Discussed with patient we will be here on an as needed basis. We will plan on EGD and colonoscopy when it is safe to do so. Once his HCC treatment is completed, we will re-institute surveillance of Cirrhosis with AFP and imaging every 6 months.    Documentation by Marilyn VARGAS acting as a scribe in the following sections on behalf of the billable provider: HPI, ROS, assessment, & plan.

## 2020-10-20 NOTE — PROGRESS NOTES
Subjective Patient feeling  well, discussed recent stereotactic therapy for hepatocellular carcinoma and follow-up scans demonstrating response.      REASON FOR FOLLOW-UP: Hepatocellular carcinoma        History of Present Illness          The patient is a 75-year-old male with a history of hypertension, B12 deficiency, elevated hemidiaphragm with previous assessment by pulmonary medicine with initial review by cardiology in May 2016 for dyspnea and also unspecified hepatic cirrhosis.  Assessments had included CT of chest May 26, 2016 with significant elevation of left hemidiaphragm at the level of the love with mass-effect on the mediastinum to the right, compressive atelectatic change left lower lobe and lingula, pulmonary arteries enlarged with the main pulmonary artery measuring 3.5 cm, ectasia of the ascending thoracic aorta measuring 4.2 cm and evidence of a cirrhotic liver with diffusely nodular and lobular liver contour with significant enlargement left hepatic lobe.  The spleen was not enlarged and there is no evidence of ascites though there was suprahepatic IVC dilation.  This cirrhosis has been assessed by CT-guided biopsy the liver in September 2016 pathology revealing patchy mild portal chronic inflammatory change without significant steatosis or lobular inflammation, trichrome and reticulin stains with mild periportal fibrosis, iron stains negative.     He had follow-up with primary care was seen in 2017 by neurosurgery when he developed focal weakness starting in June 2017 with facial left-sided focality noted, associated dizziness and fatigue.  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  An MRI of the brain was obtained MRI of the brain and revealed no definite acute or subacute intracranial abnormality, evidence of chronic small vessel disease, tortuosity of the intracranial arteries from chronic  hypertension, mild effacement of the left 3rd cranial nerve as the etiology of ptosis and additional vascular impingement involving intracranial right optic nerve, bilateral mamillary bodies and possible proximal cisternal left 7th/8th cranial nerves of unknown clinical significance.  This was reviewed by neurosurgery with a large bone spur on the left side at L3-4 compressing the C3 and C4 nerves thus producing the phrenic nerve paralysis.  Is not felt that this would change with surgical intervention.  Neurologic assessment proceeded for possible myasthenia gravis with negative findings         The patient is next seen by GI medicine March 2, 2020 for elevated liver function tests and laboratory studies as well as additional radiologic studies were obtained.  CT scan of the abdomen demonstrated progression of cirrhotic liver morphology with more prominent varices, splenic size increased in size and 1.4 cm hyperenhancing left hepatic lobe lesion thought to represent a regenerating nodule?  Hepatocellular carcinoma, nonspecific increase in a small node involving the elvin hepatis and no change in left hemidiaphragmatic elevation.  Duplex studies portal circulation revealed no evidence of acute or chronic thrombosis normal flow directions.  Additional laboratory studies including ALBIN, alpha-1 antitrypsin, antimicrosomal antibodies and anti-smooth muscle were negative.  Celiac panel, ceruloplasmin, hepatitis profile negative though monoclonal antibodies were slightly elevated 23.2.  Additional markers for fetoprotein, CEA and CA 19-9 were normal.       CTP score of 5-class A, meld score of 13         A follow-up CT scan of the abdomen performed June 26, 2020 revealed an increase in the size of the hyperenhancing 1.6 x 1.1 medial hepatic segment nodule now up to 1.8 cm suspicious for dysplastic nodule hepatocellular carcinoma, 1 cm hyperenhancing focus in lateral panic segment is indeterminant, stable nodes in the  elvin hepatis and stable elevation of left hemidiaphragm.  A follow-up MRI shows cirrhotic appearance of the liver with a well defined lesion with a medial hepatic segment measuring 3.7 x 3.4.  There is a questionable lesion in the lateral hepatic  segment of the previous exam is not identified with no evidence of biliary dilatation and a 2 cm node elvin hepatis seen on the previous CT scan is not defined secondary to breathing artifact.     These findings are discussed with radiology July 2020 with the recommendation to pay closer attention to the CT scanning rather than to the MRI with the latter suggesting some degree of regeneration around the known increasing medial hepatic lesion.  As result the patient staging would be  [id number] MX by TNM staging and albumin-bilirubin (ALBl) score 0.67.       We were contacted about this patient prior to his visit July 9, 2020 as he had not been felt a candidate for surgery secondary to respiratory compromise and been advised to proceed with stereotactic radiation therapy to the liver lesion in question.    As he is seen with his wife July 9, 2020 they have just been assessed by Dr. Nadir Gonzalez at [facility] and advised to proceed with stereotactic radiation therapy which will likely proceed over the next 1 to 2 weeks.  Symptomatically the patient feels well having improved respiratorily with the use of additional respirator at home and diuretic therapy through his cardiologist.    It was elected to have him undergo a PET/CT examination and liquid biopsy as well as additional laboratory studies.  The studies have included a normal pro time with an INR of 1.09, PTT of 35.6, normal fibrinogen, normal AFP, transaminases with ALT of 46 AST of 75, alk phos of 163 and total bilirubin of 1.6.  Further the patient's PET/CT fails to show any hypermetabolic liver lesion and no hypermetabolic lymphadenopathy within the abdomen though this considers the fact that  hepatocellular carcinoma typically has low FDG avidity.  There is no other suspicious activity in the neck, chest, abdomen or pelvis.  The patient is contacted July 21 by telephone having just been seen by Dr. Ian Sandoval radiation therapy at [facility].  The patient is not a candidate for stereotactic radiation given over approximately a week's time with treatment planning scheduled within the next week.  Is agreeable that a follow-up MRI examination to compare to previous would be done a month after treatment is completed.     Patient had follow-up visits with cardiology August 28, 2020 improving on spironolactone.  He was seen by GI medicine August 18, 2020 having recently proceeded through radiation therapy with some degree of fatigue.  Patient Chase he was treated, August 11 and August 13, 2020.  Plans were made for EGD for evaluation of varices and colonoscopy for screening and plans to reinstitute surveillance of cirrhosis without fetoprotein imaging every 6 months.  Patient seen in office August 25, 2020 with pain surrounding the hepatic capsule that developed to start radiation therapy, now resolved, stable weight and appetite and generally good performance status.  He is to have a follow-up MRI in mid October, reviewed by radiation therapy and reassessment here shortly thereafter as discussed.    The patient did proceed with a follow-up MRI of the abdomen obtained October 15 showing a decrease in the medial hepatic segment mass measuring 3.4 x 2.8 previously 3.7 x 3.4.  There is no enhancement on the early postcontrast arterial phase and no change in appearance of the liver which is grossly cirrhotic.  The portal vein, splenic vein and SMV are patent.  At this point there are plans for him to undergo follow-up scans in approximately 3 months in mid January.  Fortunately he is not otherwise symptomatic with stable weight, appetite and performance status.  And a fetoprotein level is currently  pending.      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   • Vitamin B 12 deficiency         Past Surgical History:   Procedure Laterality Date   • CARDIAC CATHETERIZATION N/A 6/3/2016    Procedure: Coronary angiography;  Surgeon: Александр Man MD;  Location: [facility] CATH INVASIVE LOCATION;  Service:    • CARDIAC CATHETERIZATION N/A 6/3/2016    Procedure: Right and Left Heart Cath;  Surgeon: Александр Man MD;  [facility] C[facility];  Service:    • CARDIAC CATHETERIZATION N/A 6/3/2016    Procedure: Left ventriculography;  Surgeon: Александр Man MD;  Location: [facility] CATH INVASIVE LOCATION;  Service:    • NOSE SURGERY          Current Outpatient Medications on File Prior to Visit   Medication Sig Dispense Refill   • benazepril (Lotensin) 20 MG tablet Take 1 tablet by mouth 2 (two) times a day. 180 tablet 1   • Cyanocobalamin 1000 MCG/ML kit Inject 1,000 mcg as directed every 3 (three) months.     • doxycycline (PERIOSTAT) 20 MG tablet      • spironolactone (ALDACTONE) 25 MG tablet Take 1 tablet by mouth Daily. 90 tablet 1     No current facility-administered medications on file prior to visit.         ALLERGIES:  No Known Allergies     Social History     Socioeconomic History   • Marital status:      Spouse name: Jan   • Number of children: 0   • Years of education: Not on file   • Highest education level: Not on file   Occupational History   • Occupation:      Employer: [employer]   Tobacco Use   • Smoking status: Never Smoker   • Smokeless tobacco: Never Used   Substance and Sexual  "Activity   • Alcohol use: Yes     Comment: rare/  Daily caffeine use   • Drug use: No   • Sexual activity: Yes     Partners: Female        Family History   Problem Relation Age of Onset   • Heart attack Father    • Cancer Father    • Alcohol abuse Father    • Dementia Father    • Colon cancer Neg Hx    • Colon polyps Neg Hx       Review of systems unchanged from previous assessed July 21, 2020  Review of Systems   Constitutional: Positive for activity change and fatigue. Negative for unexpected weight change.   HENT: Negative.    Respiratory: Positive for shortness of breath.    Cardiovascular: Positive for leg swelling.   Gastrointestinal: Negative.    Genitourinary: Negative.    Musculoskeletal: Positive for arthralgias.   Skin: Negative.    Neurological: Negative.    Psychiatric/Behavioral: Negative.         Objective     Vitals:    10/27/20 1428   BP: 116/78   Pulse: 85   Resp: 16   Temp: 97.4 °F (36.3 °C)   TempSrc: Temporal   SpO2: 97%   Weight: 95.8 kg (211 lb 3.2 oz)   Height: 180.3 cm (70.98\")   PainSc: 0-No pain     Current Status 10/27/2020   ECOG score 0     Physical exam not performed, below is from July 9, 2020  Physical Exam   Constitutional: He is oriented to person, place, and time. He appears well-developed.   HENT:   Head: Normocephalic and atraumatic.   Eyes: Pupils are equal, round, and reactive to light. Conjunctivae are normal.   Neck: Normal range of motion. Neck supple.   Cardiovascular: Normal rate, regular rhythm and normal heart sounds.   Pulmonary/Chest: Effort normal.   Absent breath sounds left lung base to apex   Abdominal: Soft. Bowel sounds are normal. He exhibits no distension and no mass. There is no abdominal tenderness. There is no guarding.   Musculoskeletal: Normal range of motion.      Comments: Improved edema as compared to previous   Neurological: He is alert and oriented to person, place, and time.   Skin: Skin is warm and dry.   Psychiatric: His behavior is normal. " Thought content normal.         RECENT LABS:  Hematology WBC   Date Value Ref Range Status   10/27/2020 4.26 3.40 - 10.80 10*3/mm3 Final   06/11/2020 3.90 3.40 - 10.80 10*3/mm3 Final     RBC   Date Value Ref Range Status   10/27/2020 3.58 (L) 4.14 - 5.80 10*6/mm3 Final   06/11/2020 4.16 4.14 - 5.80 10*6/mm3 Final     Hemoglobin   Date Value Ref Range Status   10/27/2020 12.5 (L) 13.0 - 17.7 g/dL Final     Hematocrit   Date Value Ref Range Status   10/27/2020 37.1 (L) 37.5 - 51.0 % Final     Platelets   Date Value Ref Range Status   10/27/2020 109 (L) 140 - 450 10*3/mm3 Final      F-18 FDG PET FROM SKULL BASE TO MID THIGH WITH PET/CT FUSION 7/16/2020    FINDINGS: The liver has a heterogeneous speckled pattern of activity.  There is no hypermetabolic activity corresponding to the approximately  3.7 x 3.4 cm mass at the medial hepatic segment seen on the recent MRI.  There is no hypermetabolic lymphadenopathy within the abdomen. There is  no suspicious hypermetabolic activity within the abdomen or pelvis.  There is no suspicious hypermetabolic activity within the chest or neck.     IMPRESSION:  1. There is no hypermetabolic liver lesion and there is no  hypermetabolic lymphadenopathy within the abdomen. Hepatocellular  carcinoma typically has low FDG avidity and the lesion at the medial  hepatic segment has low FDG avidity.  2. There is no suspicious hypermetabolic activity within the neck,  chest, abdomen, or pelvis.     This report was finalized on 7/17/2020 3:10 PM by Dr. Rosemarie Dominguez M.D.        MRI OF THE LIVER WITH AND WITHOUT CONTRAST October 15, 2020      FINDINGS: There has been interval decrease in the size of the medial  hepatic segment mass measuring 3.4 x 2.8 cm, previously 3.7 x 3.4 cm.  There is some heterogeneous internal signal on the precontrast series  without evidence for postcontrast enhancement. Specifically, there is no  hyperenhancement on the early postcontrast or arterial phase. There  is  otherwise no change in the appearance of the liver which is grossly  cirrhotic and heterogeneous in signal. The portal vein, splenic vein,  and SMV are patent. The gallbladder appears unremarkable and there is no  biliary dilatation. There is no lymphadenopathy within the abdomen.  There is a trace amount of perihepatic fluid.     IMPRESSION:  1. There has been interval decrease in the size of the 3.4 x 2.8 cm  medial hepatic segment lesion and there is no postcontrast enhancement  following stereotactic radiation. Followup is recommended with a liver  MRI in 6-8 weeks.  2. 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  He has not had additional therapeutic intervention for his elevated hemidiaphragm up to this point though this is now felt to be an option.  He had recently been seen by GI medicine in March with elevated liver function tests and CT scan of the abdomen demonstrated progression of cirrhotic liver morphology with more prominent varices, splenic size and 1.4 cm hyperenhancing left hepatic lobe lesion that is been somewhat suspicious leading to laboratory studies negative including alpha-fetoprotein, CEA and CA 19-9 levels.     A follow-up CT scan of the abdomen in late June demonstrates an increase in hyperenhancing medial hepatic segment nodule with a 1 cm additional focus in the lateral hepatic segment.  A follow-up MRI demonstrates a cirrhotic appearance of the liver with a defined lesion in the medial hepatic segment measuring 3.7 x 3.4 with no other clear abnormalities present.  The patient has a LI- RAD category 5 lesion after discussions with radiology and has had assessments at [facility] surgical oncology.   He is not felt to be an operative candidate and not a transplant candidate at this point.        We have discussed in the interval proceeding to PET/CT and liquid biopsy to assess any other sites of potential metastasis and whether we might learn indirectly about the patient's lesion diagnostically and/or therapeutically.  He has now been offered stereotactic radiation therapy at [facility].  After local therapy if he demonstrates progression of disease systemic therapy options include sorafenib or regorfenib versus lapatinib versus a combination of atezolizumab plus bevacizumab as first-line and second line nivolumab.  The patient was further assessed with foundation liquid which is currently pending at the time of this dictation when the patient is contacted by telephone July 21, 2020.  Additional laboratory studies were otherwise unremarkable except mild elevation of transaminases.  At this point the patient is been assessed by radiation therapy at [facility] and is a candidate for stereotactic radiation within the next 2 weeks.  It is agreed that this would proceed.  Plan also to review the patient's pulmonary function testing as we try to determine whether he will proceed with diaphragmatic plication at a later point.  After discussion it was agreed the patient will continue with radiation therapy plan to Dr. Ian Sandoval, follow-up with MRI after his procedure and pains results again reassess for thoracic surgery to consider diaphragmatic plication.   Fortunately as he is assessed August 25 he is recovering from the acute effects of stereotactic radiation therapy and has an excellent performance status overall.    The patient is next reassessed October 27, 2020 with recent MRI of abdomen October 15 demonstrating a clear response to therapy.  We discussed follow-up examinations now scheduled in January as well as repeat AFP now pending.       Plan:    *The patient's next MRI evaluation is  planned in mid January and we hope to see him shortly thereafter in office.    *There remains a question about diaphragmatic surgery which I will discuss with Dr. Christian.

## 2021-01-01 ENCOUNTER — HOSPITAL ENCOUNTER (OUTPATIENT)
Dept: ULTRASOUND IMAGING | Facility: HOSPITAL | Age: 76
Discharge: HOME | End: 2021-05-18
Admitting: SURGERY

## 2021-01-01 ENCOUNTER — OFFICE VISIT (OUTPATIENT)
Dept: SURGERY | Facility: CLINIC | Age: 76
End: 2021-01-01

## 2021-01-01 ENCOUNTER — HOSPITAL ENCOUNTER (OUTPATIENT)
Facility: HOSPITAL | Age: 76
Setting detail: OBSERVATION
Discharge: HOME HEALTH | End: 2021-06-10
Attending: SURGERY | Admitting: SURGERY

## 2021-01-01 ENCOUNTER — APPOINTMENT (OUTPATIENT)
Dept: ULTRASOUND IMAGING | Facility: HOSPITAL | Age: 76
End: 2021-01-01

## 2021-01-01 ENCOUNTER — APPOINTMENT (OUTPATIENT)
Dept: GENERAL RADIOLOGY | Facility: HOSPITAL | Age: 76
End: 2021-01-01

## 2021-01-01 ENCOUNTER — TELEPHONE (OUTPATIENT)
Dept: ONCOLOGY | Facility: CLINIC | Age: 76
End: 2021-01-01

## 2021-01-01 ENCOUNTER — PREP FOR SURGERY (OUTPATIENT)
Dept: OTHER | Facility: HOSPITAL | Age: 76
End: 2021-01-01

## 2021-01-01 ENCOUNTER — LAB (OUTPATIENT)
Dept: LAB | Facility: HOSPITAL | Age: 76
End: 2021-01-01

## 2021-01-01 ENCOUNTER — HOME HEALTH ADMISSION (OUTPATIENT)
Dept: HOME HEALTH SERVICES | Facility: HOME HEALTHCARE | Age: 76
End: 2021-01-01

## 2021-01-01 ENCOUNTER — HOSPITAL ENCOUNTER (OUTPATIENT)
Dept: ULTRASOUND IMAGING | Facility: HOSPITAL | Age: 76
Discharge: HOME | End: 2021-05-28
Admitting: SURGERY

## 2021-01-01 ENCOUNTER — READMISSION MANAGEMENT (OUTPATIENT)
Dept: CALL CENTER | Facility: HOSPITAL | Age: 76
End: 2021-01-01

## 2021-01-01 ENCOUNTER — TELEPHONE (OUTPATIENT)
Dept: GASTROENTEROLOGY | Facility: CLINIC | Age: 76
End: 2021-01-01

## 2021-01-01 ENCOUNTER — ANESTHESIA EVENT (OUTPATIENT)
Dept: PERIOP | Facility: HOSPITAL | Age: 76
End: 2021-01-01

## 2021-01-01 ENCOUNTER — CLINICAL SUPPORT (OUTPATIENT)
Dept: SURGERY | Facility: CLINIC | Age: 76
End: 2021-01-01

## 2021-01-01 ENCOUNTER — LAB REQUISITION (OUTPATIENT)
Dept: LAB | Facility: HOSPITAL | Age: 76
End: 2021-01-01

## 2021-01-01 ENCOUNTER — HOME CARE VISIT (OUTPATIENT)
Dept: HOME HEALTH SERVICES | Facility: HOME HEALTHCARE | Age: 76
End: 2021-01-01

## 2021-01-01 ENCOUNTER — HOSPITAL ENCOUNTER (OUTPATIENT)
Facility: HOSPITAL | Age: 76
Setting detail: HOSPITAL OUTPATIENT SURGERY
Discharge: HOME | End: 2021-03-10
Attending: SURGERY | Admitting: SURGERY

## 2021-01-01 ENCOUNTER — TRANSCRIBE ORDERS (OUTPATIENT)
Dept: ADMINISTRATIVE | Facility: HOSPITAL | Age: 76
End: 2021-01-01

## 2021-01-01 ENCOUNTER — DOCUMENTATION (OUTPATIENT)
Dept: SURGERY | Facility: CLINIC | Age: 76
End: 2021-01-01

## 2021-01-01 ENCOUNTER — HOSPITAL ENCOUNTER (OUTPATIENT)
Dept: RESPIRATORY THERAPY | Facility: HOSPITAL | Age: 76
Discharge: HOME | End: 2021-01-19

## 2021-01-01 ENCOUNTER — APPOINTMENT (OUTPATIENT)
Dept: LAB | Facility: HOSPITAL | Age: 76
End: 2021-01-01

## 2021-01-01 ENCOUNTER — APPOINTMENT (OUTPATIENT)
Dept: OTHER | Facility: HOSPITAL | Age: 76
End: 2021-01-01

## 2021-01-01 ENCOUNTER — APPOINTMENT (OUTPATIENT)
Dept: PREADMISSION TESTING | Facility: HOSPITAL | Age: 76
End: 2021-01-01

## 2021-01-01 ENCOUNTER — OFFICE VISIT (OUTPATIENT)
Dept: ONCOLOGY | Facility: CLINIC | Age: 76
End: 2021-01-01

## 2021-01-01 ENCOUNTER — HOSPITAL ENCOUNTER (OUTPATIENT)
Dept: ULTRASOUND IMAGING | Facility: HOSPITAL | Age: 76
Discharge: HOME | End: 2021-05-06
Admitting: SURGERY

## 2021-01-01 ENCOUNTER — HOSPITAL ENCOUNTER (OUTPATIENT)
Dept: MRI IMAGING | Facility: HOSPITAL | Age: 76
Discharge: HOME | End: 2021-01-18
Admitting: RADIOLOGY

## 2021-01-01 ENCOUNTER — ANESTHESIA (OUTPATIENT)
Dept: PERIOP | Facility: HOSPITAL | Age: 76
End: 2021-01-01

## 2021-01-01 ENCOUNTER — TRANSCRIBE ORDERS (OUTPATIENT)
Dept: HOME HEALTH SERVICES | Facility: HOME HEALTHCARE | Age: 76
End: 2021-01-01

## 2021-01-01 ENCOUNTER — HOSPITAL ENCOUNTER (OUTPATIENT)
Dept: ULTRASOUND IMAGING | Facility: HOSPITAL | Age: 76
Discharge: HOME | End: 2021-06-15
Admitting: INTERNAL MEDICINE

## 2021-01-01 ENCOUNTER — TELEPHONE (OUTPATIENT)
Dept: INTERVENTIONAL RADIOLOGY/VASCULAR | Facility: HOSPITAL | Age: 76
End: 2021-01-01

## 2021-01-01 ENCOUNTER — APPOINTMENT (OUTPATIENT)
Dept: CARDIOLOGY | Facility: HOSPITAL | Age: 76
End: 2021-01-01

## 2021-01-01 VITALS
HEIGHT: 71 IN | HEART RATE: 66 BPM | SYSTOLIC BLOOD PRESSURE: 105 MMHG | DIASTOLIC BLOOD PRESSURE: 70 MMHG | RESPIRATION RATE: 18 BRPM | BODY MASS INDEX: 30.21 KG/M2 | TEMPERATURE: 97.3 F | OXYGEN SATURATION: 97 % | WEIGHT: 215.8 LBS

## 2021-01-01 VITALS
TEMPERATURE: 97.7 F | SYSTOLIC BLOOD PRESSURE: 118 MMHG | BODY MASS INDEX: 29.62 KG/M2 | OXYGEN SATURATION: 97 % | RESPIRATION RATE: 18 BRPM | WEIGHT: 200 LBS | DIASTOLIC BLOOD PRESSURE: 81 MMHG | HEART RATE: 94 BPM | HEIGHT: 69 IN

## 2021-01-01 VITALS
HEIGHT: 67 IN | BODY MASS INDEX: 29.82 KG/M2 | DIASTOLIC BLOOD PRESSURE: 72 MMHG | SYSTOLIC BLOOD PRESSURE: 106 MMHG | HEART RATE: 80 BPM | OXYGEN SATURATION: 97 % | WEIGHT: 190 LBS | RESPIRATION RATE: 18 BRPM

## 2021-01-01 VITALS
DIASTOLIC BLOOD PRESSURE: 80 MMHG | SYSTOLIC BLOOD PRESSURE: 122 MMHG | BODY MASS INDEX: 31.67 KG/M2 | OXYGEN SATURATION: 97 % | WEIGHT: 213.8 LBS | RESPIRATION RATE: 16 BRPM | HEART RATE: 98 BPM | HEIGHT: 69 IN | TEMPERATURE: 97.7 F

## 2021-01-01 VITALS
DIASTOLIC BLOOD PRESSURE: 80 MMHG | BODY MASS INDEX: 32.96 KG/M2 | HEIGHT: 67 IN | TEMPERATURE: 97.7 F | OXYGEN SATURATION: 99 % | WEIGHT: 210 LBS | RESPIRATION RATE: 18 BRPM | SYSTOLIC BLOOD PRESSURE: 110 MMHG | HEART RATE: 91 BPM

## 2021-01-01 VITALS — BODY MASS INDEX: 29.83 KG/M2 | WEIGHT: 190.04 LBS | HEIGHT: 67 IN

## 2021-01-01 VITALS
TEMPERATURE: 97.5 F | HEART RATE: 63 BPM | RESPIRATION RATE: 17 BRPM | SYSTOLIC BLOOD PRESSURE: 89 MMHG | OXYGEN SATURATION: 99 % | DIASTOLIC BLOOD PRESSURE: 57 MMHG

## 2021-01-01 VITALS
SYSTOLIC BLOOD PRESSURE: 89 MMHG | OXYGEN SATURATION: 94 % | DIASTOLIC BLOOD PRESSURE: 69 MMHG | HEART RATE: 60 BPM | TEMPERATURE: 97.1 F | RESPIRATION RATE: 16 BRPM

## 2021-01-01 VITALS
BODY MASS INDEX: 29.82 KG/M2 | HEART RATE: 82 BPM | TEMPERATURE: 97.3 F | HEIGHT: 67 IN | SYSTOLIC BLOOD PRESSURE: 101 MMHG | OXYGEN SATURATION: 99 % | WEIGHT: 190 LBS | DIASTOLIC BLOOD PRESSURE: 74 MMHG | RESPIRATION RATE: 16 BRPM

## 2021-01-01 VITALS
SYSTOLIC BLOOD PRESSURE: 121 MMHG | TEMPERATURE: 97.7 F | DIASTOLIC BLOOD PRESSURE: 73 MMHG | HEIGHT: 68 IN | HEART RATE: 97 BPM | WEIGHT: 213.4 LBS | OXYGEN SATURATION: 98 % | RESPIRATION RATE: 16 BRPM | BODY MASS INDEX: 32.34 KG/M2

## 2021-01-01 DIAGNOSIS — Z23 IMMUNIZATION DUE: ICD-10-CM

## 2021-01-01 DIAGNOSIS — C22.0 HEPATOCELLULAR CARCINOMA (HCC): ICD-10-CM

## 2021-01-01 DIAGNOSIS — C22.0 HEPATOCELLULAR CARCINOMA (HCC): Primary | ICD-10-CM

## 2021-01-01 DIAGNOSIS — J98.6 ELEVATED HEMIDIAPHRAGM: ICD-10-CM

## 2021-01-01 DIAGNOSIS — C22.0 HEPATOMA (HCC): ICD-10-CM

## 2021-01-01 DIAGNOSIS — R19.09 MASS OF RIGHT INGUINAL REGION: ICD-10-CM

## 2021-01-01 DIAGNOSIS — Z01.818 PRE-OP EXAM: ICD-10-CM

## 2021-01-01 DIAGNOSIS — K74.60 HEPATIC CIRRHOSIS, UNSPECIFIED HEPATIC CIRRHOSIS TYPE, UNSPECIFIED WHETHER ASCITES PRESENT (HCC): ICD-10-CM

## 2021-01-01 DIAGNOSIS — R18.8 OTHER ASCITES: Primary | ICD-10-CM

## 2021-01-01 DIAGNOSIS — R19.09 MASS OF RIGHT INGUINAL REGION: Primary | ICD-10-CM

## 2021-01-01 DIAGNOSIS — C22.0 HEPATOMA (HCC): Primary | ICD-10-CM

## 2021-01-01 DIAGNOSIS — R05.9 COUGH: Primary | ICD-10-CM

## 2021-01-01 DIAGNOSIS — K74.60 CIRRHOSIS OF LIVER WITHOUT ASCITES, UNSPECIFIED HEPATIC CIRRHOSIS TYPE (HCC): ICD-10-CM

## 2021-01-01 DIAGNOSIS — R18.8 OTHER ASCITES: ICD-10-CM

## 2021-01-01 DIAGNOSIS — K74.60 CIRRHOSIS OF LIVER WITH ASCITES, UNSPECIFIED HEPATIC CIRRHOSIS TYPE (HCC): Primary | ICD-10-CM

## 2021-01-01 DIAGNOSIS — K74.60 UNSPECIFIED CIRRHOSIS OF LIVER (HCC): ICD-10-CM

## 2021-01-01 DIAGNOSIS — Z01.818 PRE-OP EXAM: Primary | ICD-10-CM

## 2021-01-01 DIAGNOSIS — R93.2 ABNORMAL CT OF LIVER: ICD-10-CM

## 2021-01-01 DIAGNOSIS — R18.8 CIRRHOSIS OF LIVER WITH ASCITES, UNSPECIFIED HEPATIC CIRRHOSIS TYPE (HCC): Primary | ICD-10-CM

## 2021-01-01 DIAGNOSIS — C22.0 HEPATOCELLULAR CARCINOMA: ICD-10-CM

## 2021-01-01 DIAGNOSIS — K40.90 RIGHT INGUINAL HERNIA: Primary | ICD-10-CM

## 2021-01-01 DIAGNOSIS — K40.90 RIGHT INGUINAL HERNIA: ICD-10-CM

## 2021-01-01 DIAGNOSIS — C22.7 OTHER SPECIFIED CARCINOMAS OF LIVER (HCC): ICD-10-CM

## 2021-01-01 DIAGNOSIS — R93.2 ABNORMAL CT OF LIVER: Primary | ICD-10-CM

## 2021-01-01 LAB
ALBUMIN FLD-MCNC: <0.4 G/DL
ALBUMIN SERPL-MCNC: 2.4 G/DL (ref 3.5–5.2)
ALBUMIN SERPL-MCNC: 2.6 G/DL (ref 3.5–5.2)
ALBUMIN SERPL-MCNC: 2.7 G/DL (ref 3.5–5.2)
ALBUMIN SERPL-MCNC: 2.8 G/DL (ref 3.5–5.2)
ALBUMIN SERPL-MCNC: 2.9 G/DL (ref 3.5–5.2)
ALBUMIN SERPL-MCNC: 3 G/DL (ref 3.5–5.2)
ALBUMIN SERPL-MCNC: 3.1 G/DL (ref 3.5–5.2)
ALBUMIN SERPL-MCNC: 3.1 G/DL (ref 3.5–5.2)
ALBUMIN/GLOB SERPL: 0.7 G/DL
ALBUMIN/GLOB SERPL: 0.8 G/DL
ALBUMIN/GLOB SERPL: 0.9 G/DL
ALBUMIN/GLOB SERPL: 1 G/DL
ALP SERPL-CCNC: 209 U/L (ref 39–117)
ALP SERPL-CCNC: 280 U/L (ref 39–117)
ALP SERPL-CCNC: 284 U/L (ref 39–117)
ALP SERPL-CCNC: 285 U/L (ref 39–117)
ALP SERPL-CCNC: 290 U/L (ref 39–117)
ALP SERPL-CCNC: 297 U/L (ref 39–117)
ALP SERPL-CCNC: 298 U/L (ref 39–117)
ALP SERPL-CCNC: 322 U/L (ref 39–117)
ALP SERPL-CCNC: 323 U/L (ref 39–117)
ALP SERPL-CCNC: 332 U/L (ref 39–117)
ALP SERPL-CCNC: 344 U/L (ref 39–117)
ALPHA-FETOPROTEIN: 4.74 NG/ML (ref 0–8.3)
ALT SERPL W P-5'-P-CCNC: 140 U/L (ref 1–41)
ALT SERPL W P-5'-P-CCNC: 149 U/L (ref 1–41)
ALT SERPL W P-5'-P-CCNC: 159 U/L (ref 1–41)
ALT SERPL W P-5'-P-CCNC: 165 U/L (ref 1–41)
ALT SERPL W P-5'-P-CCNC: 44 U/L (ref 1–41)
ALT SERPL W P-5'-P-CCNC: 47 U/L (ref 1–41)
ALT SERPL W P-5'-P-CCNC: 48 U/L (ref 1–41)
ALT SERPL W P-5'-P-CCNC: 57 U/L (ref 1–41)
ALT SERPL W P-5'-P-CCNC: 57 U/L (ref 1–41)
ALT SERPL W P-5'-P-CCNC: 79 U/L (ref 1–41)
ALT SERPL W P-5'-P-CCNC: 99 U/L (ref 1–41)
AMMONIA BLD-SCNC: 27 UMOL/L (ref 16–60)
AMYLASE FLD-CCNC: 13 U/L
ANION GAP SERPL CALCULATED.3IONS-SCNC: 11 MMOL/L (ref 5–15)
ANION GAP SERPL CALCULATED.3IONS-SCNC: 11.5 MMOL/L (ref 5–15)
ANION GAP SERPL CALCULATED.3IONS-SCNC: 11.6 MMOL/L (ref 5–15)
ANION GAP SERPL CALCULATED.3IONS-SCNC: 12.3 MMOL/L (ref 5–15)
ANION GAP SERPL CALCULATED.3IONS-SCNC: 6.2 MMOL/L (ref 5–15)
ANION GAP SERPL CALCULATED.3IONS-SCNC: 6.5 MMOL/L (ref 5–15)
ANION GAP SERPL CALCULATED.3IONS-SCNC: 6.9 MMOL/L (ref 5–15)
ANION GAP SERPL CALCULATED.3IONS-SCNC: 7.2 MMOL/L (ref 5–15)
ANION GAP SERPL CALCULATED.3IONS-SCNC: 7.5 MMOL/L (ref 5–15)
ANION GAP SERPL CALCULATED.3IONS-SCNC: 7.7 MMOL/L (ref 5–15)
ANION GAP SERPL CALCULATED.3IONS-SCNC: 8.5 MMOL/L (ref 5–15)
ANION GAP SERPL CALCULATED.3IONS-SCNC: 8.8 MMOL/L (ref 5–15)
APPEARANCE FLD: ABNORMAL
APPEARANCE FLD: CLEAR
ARTERIAL PATENCY WRIST A: POSITIVE
AST SERPL-CCNC: 101 U/L (ref 1–40)
AST SERPL-CCNC: 139 U/L (ref 1–40)
AST SERPL-CCNC: 141 U/L (ref 1–40)
AST SERPL-CCNC: 174 U/L (ref 1–40)
AST SERPL-CCNC: 186 U/L (ref 1–40)
AST SERPL-CCNC: 229 U/L (ref 1–40)
AST SERPL-CCNC: 82 U/L (ref 1–40)
AST SERPL-CCNC: 86 U/L (ref 1–40)
AST SERPL-CCNC: 87 U/L (ref 1–40)
AST SERPL-CCNC: 92 U/L (ref 1–40)
AST SERPL-CCNC: 93 U/L (ref 1–40)
ATMOSPHERIC PRESS: 758.3 MMHG
BACTERIA SPEC AEROBE CULT: ABNORMAL
BACTERIA SPEC AEROBE CULT: NORMAL
BACTERIA SPEC AEROBE CULT: NORMAL
BACTERIA UR QL AUTO: ABNORMAL /HPF
BASE EXCESS BLDA CALC-SCNC: 1.1 MMOL/L (ref 0–2)
BASOPHILS # BLD AUTO: 0.01 10*3/MM3 (ref 0–0.2)
BASOPHILS # BLD AUTO: 0.02 10*3/MM3 (ref 0–0.2)
BASOPHILS # BLD AUTO: 0.03 10*3/MM3 (ref 0–0.2)
BASOPHILS NFR BLD AUTO: 0.2 % (ref 0–1.5)
BASOPHILS NFR BLD AUTO: 0.5 % (ref 0–1.5)
BASOPHILS NFR BLD AUTO: 0.6 % (ref 0–1.5)
BDY SITE: ABNORMAL
BH CV VAS HEPATIC PORTAL VEIN DIAMETER: 0.7 CM
BH CV VAS SMA HEPATIC EDV: 18.2 CM/S
BH CV VAS SMA HEPATIC PSV: 43.7 CM/S
BILIRUB SERPL-MCNC: 10.5 MG/DL (ref 0–1.2)
BILIRUB SERPL-MCNC: 11.2 MG/DL (ref 0–1.2)
BILIRUB SERPL-MCNC: 13.6 MG/DL (ref 0–1.2)
BILIRUB SERPL-MCNC: 3.3 MG/DL (ref 0–1.2)
BILIRUB SERPL-MCNC: 3.3 MG/DL (ref 0–1.2)
BILIRUB SERPL-MCNC: 3.6 MG/DL (ref 0–1.2)
BILIRUB SERPL-MCNC: 3.7 MG/DL (ref 0–1.2)
BILIRUB SERPL-MCNC: 4.3 MG/DL (ref 0–1.2)
BILIRUB SERPL-MCNC: 6.1 MG/DL (ref 0–1.2)
BILIRUB SERPL-MCNC: 6.8 MG/DL (ref 0–1.2)
BILIRUB SERPL-MCNC: 9.8 MG/DL (ref 0–1.2)
BILIRUB UR QL STRIP: ABNORMAL
BUN SERPL-MCNC: 21 MG/DL (ref 8–23)
BUN SERPL-MCNC: 22 MG/DL (ref 8–23)
BUN SERPL-MCNC: 26 MG/DL (ref 8–23)
BUN SERPL-MCNC: 28 MG/DL (ref 8–23)
BUN SERPL-MCNC: 31 MG/DL (ref 8–23)
BUN SERPL-MCNC: 32 MG/DL (ref 8–23)
BUN SERPL-MCNC: 35 MG/DL (ref 8–23)
BUN SERPL-MCNC: 43 MG/DL (ref 8–23)
BUN SERPL-MCNC: 43 MG/DL (ref 8–23)
BUN SERPL-MCNC: 46 MG/DL (ref 8–23)
BUN SERPL-MCNC: 61 MG/DL (ref 8–23)
BUN SERPL-MCNC: 62 MG/DL (ref 8–23)
BUN/CREAT SERPL: 21.2 (ref 7–25)
BUN/CREAT SERPL: 23.7 (ref 7–25)
BUN/CREAT SERPL: 25.2 (ref 7–25)
BUN/CREAT SERPL: 27.1 (ref 7–25)
BUN/CREAT SERPL: 27.4 (ref 7–25)
BUN/CREAT SERPL: 27.7 (ref 7–25)
BUN/CREAT SERPL: 35 (ref 7–25)
BUN/CREAT SERPL: 36.5 (ref 7–25)
BUN/CREAT SERPL: 37.1 (ref 7–25)
BUN/CREAT SERPL: 41.6 (ref 7–25)
BUN/CREAT SERPL: 41.8 (ref 7–25)
BUN/CREAT SERPL: 43 (ref 7–25)
CALCIUM SPEC-SCNC: 10 MG/DL (ref 8.6–10.5)
CALCIUM SPEC-SCNC: 10.1 MG/DL (ref 8.6–10.5)
CALCIUM SPEC-SCNC: 10.4 MG/DL (ref 8.6–10.5)
CALCIUM SPEC-SCNC: 8.8 MG/DL (ref 8.6–10.5)
CALCIUM SPEC-SCNC: 9.2 MG/DL (ref 8.6–10.5)
CALCIUM SPEC-SCNC: 9.2 MG/DL (ref 8.6–10.5)
CALCIUM SPEC-SCNC: 9.3 MG/DL (ref 8.6–10.5)
CALCIUM SPEC-SCNC: 9.5 MG/DL (ref 8.6–10.5)
CALCIUM SPEC-SCNC: 9.5 MG/DL (ref 8.6–10.5)
CALCIUM SPEC-SCNC: 9.6 MG/DL (ref 8.6–10.5)
CALCIUM SPEC-SCNC: 9.6 MG/DL (ref 8.6–10.5)
CALCIUM SPEC-SCNC: 9.7 MG/DL (ref 8.6–10.5)
CHLORIDE SERPL-SCNC: 101 MMOL/L (ref 98–107)
CHLORIDE SERPL-SCNC: 102 MMOL/L (ref 98–107)
CHLORIDE SERPL-SCNC: 105 MMOL/L (ref 98–107)
CHLORIDE SERPL-SCNC: 84 MMOL/L (ref 98–107)
CHLORIDE SERPL-SCNC: 84 MMOL/L (ref 98–107)
CHLORIDE SERPL-SCNC: 85 MMOL/L (ref 98–107)
CHLORIDE SERPL-SCNC: 86 MMOL/L (ref 98–107)
CHLORIDE SERPL-SCNC: 89 MMOL/L (ref 98–107)
CHLORIDE SERPL-SCNC: 90 MMOL/L (ref 98–107)
CHLORIDE SERPL-SCNC: 94 MMOL/L (ref 98–107)
CHLORIDE SERPL-SCNC: 96 MMOL/L (ref 98–107)
CHLORIDE SERPL-SCNC: 97 MMOL/L (ref 98–107)
CLARITY UR: CLEAR
CO2 SERPL-SCNC: 23.5 MMOL/L (ref 22–29)
CO2 SERPL-SCNC: 25.7 MMOL/L (ref 22–29)
CO2 SERPL-SCNC: 25.8 MMOL/L (ref 22–29)
CO2 SERPL-SCNC: 26.2 MMOL/L (ref 22–29)
CO2 SERPL-SCNC: 26.3 MMOL/L (ref 22–29)
CO2 SERPL-SCNC: 26.4 MMOL/L (ref 22–29)
CO2 SERPL-SCNC: 27 MMOL/L (ref 22–29)
CO2 SERPL-SCNC: 27.5 MMOL/L (ref 22–29)
CO2 SERPL-SCNC: 27.5 MMOL/L (ref 22–29)
CO2 SERPL-SCNC: 27.8 MMOL/L (ref 22–29)
CO2 SERPL-SCNC: 28.5 MMOL/L (ref 22–29)
CO2 SERPL-SCNC: 29.1 MMOL/L (ref 22–29)
COLOR FLD: YELLOW
COLOR FLD: YELLOW
COLOR UR: ABNORMAL
CREAT BLDA-MCNC: 0.9 MG/DL (ref 0.6–1.3)
CREAT SERPL-MCNC: 0.93 MG/DL (ref 0.76–1.27)
CREAT SERPL-MCNC: 0.99 MG/DL (ref 0.76–1.27)
CREAT SERPL-MCNC: 1 MG/DL (ref 0.76–1.27)
CREAT SERPL-MCNC: 1 MG/DL (ref 0.76–1.27)
CREAT SERPL-MCNC: 1.01 MG/DL (ref 0.76–1.27)
CREAT SERPL-MCNC: 1.03 MG/DL (ref 0.76–1.27)
CREAT SERPL-MCNC: 1.13 MG/DL (ref 0.76–1.27)
CREAT SERPL-MCNC: 1.16 MG/DL (ref 0.76–1.27)
CREAT SERPL-MCNC: 1.18 MG/DL (ref 0.76–1.27)
CREAT SERPL-MCNC: 1.26 MG/DL (ref 0.76–1.27)
CREAT SERPL-MCNC: 1.46 MG/DL (ref 0.76–1.27)
CREAT SERPL-MCNC: 1.49 MG/DL (ref 0.76–1.27)
CREAT UR-MCNC: 140 MG/DL
CRYPTOC AG CSF QL: NEGATIVE
CYTO UR: NORMAL
DEPRECATED RDW RBC AUTO: 45.5 FL (ref 37–54)
DEPRECATED RDW RBC AUTO: 46.4 FL (ref 37–54)
DEPRECATED RDW RBC AUTO: 46.8 FL (ref 37–54)
DEPRECATED RDW RBC AUTO: 46.8 FL (ref 37–54)
DEPRECATED RDW RBC AUTO: 48.8 FL (ref 37–54)
DEPRECATED RDW RBC AUTO: 50.4 FL (ref 37–54)
DEPRECATED RDW RBC AUTO: 52.2 FL (ref 37–54)
DEPRECATED RDW RBC AUTO: 53.4 FL (ref 37–54)
DEPRECATED RDW RBC AUTO: 53.8 FL (ref 37–54)
EOSINOPHIL # BLD AUTO: 0.03 10*3/MM3 (ref 0–0.4)
EOSINOPHIL # BLD AUTO: 0.06 10*3/MM3 (ref 0–0.4)
EOSINOPHIL # BLD AUTO: 0.09 10*3/MM3 (ref 0–0.4)
EOSINOPHIL NFR BLD AUTO: 0.5 % (ref 0.3–6.2)
EOSINOPHIL NFR BLD AUTO: 1.3 % (ref 0.3–6.2)
EOSINOPHIL NFR BLD AUTO: 2.2 % (ref 0.3–6.2)
ERYTHROCYTE [DISTWIDTH] IN BLOOD BY AUTOMATED COUNT: 11.9 % (ref 12.3–15.4)
ERYTHROCYTE [DISTWIDTH] IN BLOOD BY AUTOMATED COUNT: 12.1 % (ref 12.3–15.4)
ERYTHROCYTE [DISTWIDTH] IN BLOOD BY AUTOMATED COUNT: 12.3 % (ref 12.3–15.4)
ERYTHROCYTE [DISTWIDTH] IN BLOOD BY AUTOMATED COUNT: 12.3 % (ref 12.3–15.4)
ERYTHROCYTE [DISTWIDTH] IN BLOOD BY AUTOMATED COUNT: 12.7 % (ref 12.3–15.4)
ERYTHROCYTE [DISTWIDTH] IN BLOOD BY AUTOMATED COUNT: 13.4 % (ref 12.3–15.4)
ERYTHROCYTE [DISTWIDTH] IN BLOOD BY AUTOMATED COUNT: 14.4 % (ref 12.3–15.4)
ERYTHROCYTE [DISTWIDTH] IN BLOOD BY AUTOMATED COUNT: 15.2 % (ref 12.3–15.4)
ERYTHROCYTE [DISTWIDTH] IN BLOOD BY AUTOMATED COUNT: 15.5 % (ref 12.3–15.4)
GFR SERPL CREATININE-BSD FRML MDRD: 46 ML/MIN/1.73
GFR SERPL CREATININE-BSD FRML MDRD: 47 ML/MIN/1.73
GFR SERPL CREATININE-BSD FRML MDRD: 56 ML/MIN/1.73
GFR SERPL CREATININE-BSD FRML MDRD: 60 ML/MIN/1.73
GFR SERPL CREATININE-BSD FRML MDRD: 62 ML/MIN/1.73
GFR SERPL CREATININE-BSD FRML MDRD: 63 ML/MIN/1.73
GFR SERPL CREATININE-BSD FRML MDRD: 71 ML/MIN/1.73
GFR SERPL CREATININE-BSD FRML MDRD: 72 ML/MIN/1.73
GFR SERPL CREATININE-BSD FRML MDRD: 73 ML/MIN/1.73
GFR SERPL CREATININE-BSD FRML MDRD: 73 ML/MIN/1.73
GFR SERPL CREATININE-BSD FRML MDRD: 74 ML/MIN/1.73
GFR SERPL CREATININE-BSD FRML MDRD: 79 ML/MIN/1.73
GLOBULIN UR ELPH-MCNC: 2.9 GM/DL
GLOBULIN UR ELPH-MCNC: 3.1 GM/DL
GLOBULIN UR ELPH-MCNC: 3.3 GM/DL
GLOBULIN UR ELPH-MCNC: 3.3 GM/DL
GLOBULIN UR ELPH-MCNC: 3.4 GM/DL
GLOBULIN UR ELPH-MCNC: 3.5 GM/DL
GLOBULIN UR ELPH-MCNC: 3.5 GM/DL
GLOBULIN UR ELPH-MCNC: 3.7 GM/DL
GLUCOSE FLD-MCNC: 98 MG/DL
GLUCOSE SERPL-MCNC: 107 MG/DL (ref 65–99)
GLUCOSE SERPL-MCNC: 112 MG/DL (ref 65–99)
GLUCOSE SERPL-MCNC: 116 MG/DL (ref 65–99)
GLUCOSE SERPL-MCNC: 119 MG/DL (ref 65–99)
GLUCOSE SERPL-MCNC: 122 MG/DL (ref 65–99)
GLUCOSE SERPL-MCNC: 124 MG/DL (ref 65–99)
GLUCOSE SERPL-MCNC: 125 MG/DL (ref 65–99)
GLUCOSE SERPL-MCNC: 72 MG/DL (ref 65–99)
GLUCOSE SERPL-MCNC: 75 MG/DL (ref 65–99)
GLUCOSE SERPL-MCNC: 91 MG/DL (ref 65–99)
GLUCOSE SERPL-MCNC: 92 MG/DL (ref 65–99)
GLUCOSE SERPL-MCNC: 97 MG/DL (ref 65–99)
GLUCOSE UR STRIP-MCNC: NEGATIVE MG/DL
GRAM STN SPEC: ABNORMAL
GRAM STN SPEC: ABNORMAL
HCO3 BLDA-SCNC: 25.6 MMOL/L (ref 22–28)
HCT VFR BLD AUTO: 34 % (ref 37.5–51)
HCT VFR BLD AUTO: 35.8 % (ref 37.5–51)
HCT VFR BLD AUTO: 37 % (ref 37.5–51)
HCT VFR BLD AUTO: 40 % (ref 37.5–51)
HCT VFR BLD AUTO: 40.1 % (ref 37.5–51)
HCT VFR BLD AUTO: 40.8 % (ref 37.5–51)
HCT VFR BLD AUTO: 41.3 % (ref 37.5–51)
HCT VFR BLD AUTO: 42.1 % (ref 37.5–51)
HCT VFR BLD AUTO: 44.4 % (ref 37.5–51)
HGB BLD-MCNC: 12.2 G/DL (ref 13–17.7)
HGB BLD-MCNC: 12.9 G/DL (ref 13–17.7)
HGB BLD-MCNC: 13.5 G/DL (ref 13–17.7)
HGB BLD-MCNC: 13.6 G/DL (ref 13–17.7)
HGB BLD-MCNC: 13.7 G/DL (ref 13–17.7)
HGB BLD-MCNC: 13.8 G/DL (ref 13–17.7)
HGB BLD-MCNC: 14.1 G/DL (ref 13–17.7)
HGB BLD-MCNC: 14.3 G/DL (ref 13–17.7)
HGB BLD-MCNC: 15.3 G/DL (ref 13–17.7)
HGB UR QL STRIP.AUTO: NEGATIVE
HYALINE CASTS UR QL AUTO: ABNORMAL /LPF
IMM GRANULOCYTES # BLD AUTO: 0.02 10*3/MM3 (ref 0–0.05)
IMM GRANULOCYTES NFR BLD AUTO: 0.4 % (ref 0–0.5)
INR PPP: 1 (ref 0.8–1.2)
INR PPP: 1.21 (ref 0.9–1.1)
INR PPP: 1.29 (ref 0.9–1.1)
INR PPP: 1.3 (ref 0.9–1.1)
INR PPP: 1.31 (ref 0.9–1.1)
INR PPP: 1.33 (ref 0.9–1.1)
INR PPP: 1.5 (ref 0.9–1.1)
INR PPP: 1.53 (ref 0.9–1.1)
INR PPP: 1.67 (ref 0.9–1.1)
KETONES UR QL STRIP: NEGATIVE
LAB AP CASE REPORT: NORMAL
LEUKOCYTE ESTERASE UR QL STRIP.AUTO: ABNORMAL
LYMPHOCYTES # BLD AUTO: 0.69 10*3/MM3 (ref 0.7–3.1)
LYMPHOCYTES # BLD AUTO: 1.03 10*3/MM3 (ref 0.7–3.1)
LYMPHOCYTES # BLD AUTO: 1.14 10*3/MM3 (ref 0.7–3.1)
LYMPHOCYTES NFR BLD AUTO: 11.8 % (ref 19.6–45.3)
LYMPHOCYTES NFR BLD AUTO: 21.9 % (ref 19.6–45.3)
LYMPHOCYTES NFR BLD AUTO: 27.9 % (ref 19.6–45.3)
LYMPHOCYTES NFR FLD MANUAL: 45 %
LYMPHOCYTES NFR FLD MANUAL: 51 %
MAGNESIUM SERPL-MCNC: 2.6 MG/DL (ref 1.6–2.4)
MCH RBC QN AUTO: 34.3 PG (ref 26.6–33)
MCH RBC QN AUTO: 34.3 PG (ref 26.6–33)
MCH RBC QN AUTO: 35 PG (ref 26.6–33)
MCH RBC QN AUTO: 35.1 PG (ref 26.6–33)
MCH RBC QN AUTO: 35.3 PG (ref 26.6–33)
MCH RBC QN AUTO: 35.5 PG (ref 26.6–33)
MCH RBC QN AUTO: 35.7 PG (ref 26.6–33)
MCH RBC QN AUTO: 35.9 PG (ref 26.6–33)
MCH RBC QN AUTO: 36.3 PG (ref 26.6–33)
MCHC RBC AUTO-ENTMCNC: 33.2 G/DL (ref 31.5–35.7)
MCHC RBC AUTO-ENTMCNC: 33.3 G/DL (ref 31.5–35.7)
MCHC RBC AUTO-ENTMCNC: 33.8 G/DL (ref 31.5–35.7)
MCHC RBC AUTO-ENTMCNC: 34 G/DL (ref 31.5–35.7)
MCHC RBC AUTO-ENTMCNC: 34.1 G/DL (ref 31.5–35.7)
MCHC RBC AUTO-ENTMCNC: 34.5 G/DL (ref 31.5–35.7)
MCHC RBC AUTO-ENTMCNC: 35.2 G/DL (ref 31.5–35.7)
MCHC RBC AUTO-ENTMCNC: 37 G/DL (ref 31.5–35.7)
MCHC RBC AUTO-ENTMCNC: 37.9 G/DL (ref 31.5–35.7)
MCV RBC AUTO: 102.3 FL (ref 79–97)
MCV RBC AUTO: 102.8 FL (ref 79–97)
MCV RBC AUTO: 103.4 FL (ref 79–97)
MCV RBC AUTO: 103.5 FL (ref 79–97)
MCV RBC AUTO: 104.7 FL (ref 79–97)
MCV RBC AUTO: 105.3 FL (ref 79–97)
MCV RBC AUTO: 95.8 FL (ref 79–97)
MCV RBC AUTO: 96.6 FL (ref 79–97)
MCV RBC AUTO: 99.5 FL (ref 79–97)
METHOD: ABNORMAL
METHOD: NORMAL
MODALITY: ABNORMAL
MONOCYTES # BLD AUTO: 0.65 10*3/MM3 (ref 0.1–0.9)
MONOCYTES # BLD AUTO: 0.65 10*3/MM3 (ref 0.1–0.9)
MONOCYTES # BLD AUTO: 0.73 10*3/MM3 (ref 0.1–0.9)
MONOCYTES NFR BLD AUTO: 11.1 % (ref 5–12)
MONOCYTES NFR BLD AUTO: 15.5 % (ref 5–12)
MONOCYTES NFR BLD AUTO: 15.9 % (ref 5–12)
MONOCYTES NFR FLD: 38 %
MONOS+MACROS NFR FLD: 10 %
MONOS+MACROS NFR FLD: 2 %
NEUTROPHILS NFR BLD AUTO: 2.16 10*3/MM3 (ref 1.7–7)
NEUTROPHILS NFR BLD AUTO: 2.83 10*3/MM3 (ref 1.7–7)
NEUTROPHILS NFR BLD AUTO: 4.4 10*3/MM3 (ref 1.7–7)
NEUTROPHILS NFR BLD AUTO: 53 % (ref 42.7–76)
NEUTROPHILS NFR BLD AUTO: 60.3 % (ref 42.7–76)
NEUTROPHILS NFR BLD AUTO: 75.2 % (ref 42.7–76)
NEUTROPHILS NFR FLD MANUAL: 15 %
NEUTROPHILS NFR FLD MANUAL: 39 %
NITRITE UR QL STRIP: POSITIVE
NRBC BLD AUTO-RTO: 0.2 /100 WBC (ref 0–0.2)
NUC CELL # FLD: 122 /MM3
NUC CELL # FLD: 98 /MM3
OSMOLALITY UR: 830 MOSM/KG
PATH REPORT.FINAL DX SPEC: NORMAL
PATH REPORT.GROSS SPEC: NORMAL
PCO2 BLDA: 39.5 MM HG (ref 35–45)
PH BLDA: 7.42 PH UNITS (ref 7.35–7.45)
PH UR STRIP.AUTO: <=5 [PH] (ref 5–8)
PHOSPHATE SERPL-MCNC: 2.3 MG/DL (ref 2.5–4.5)
PHOSPHATE SERPL-MCNC: 3.5 MG/DL (ref 2.5–4.5)
PLATELET # BLD AUTO: 113 10*3/MM3 (ref 140–450)
PLATELET # BLD AUTO: 121 10*3/MM3 (ref 140–450)
PLATELET # BLD AUTO: 122 10*3/MM3 (ref 140–450)
PLATELET # BLD AUTO: 142 10*3/MM3 (ref 140–450)
PLATELET # BLD AUTO: 142 10*3/MM3 (ref 140–450)
PLATELET # BLD AUTO: 163 10*3/MM3 (ref 140–450)
PLATELET # BLD AUTO: 167 10*3/MM3 (ref 140–450)
PLATELET # BLD AUTO: 175 10*3/MM3 (ref 140–450)
PLATELET # BLD AUTO: 176 10*3/MM3 (ref 140–450)
PMV BLD AUTO: 10 FL (ref 6–12)
PMV BLD AUTO: 10.1 FL (ref 6–12)
PMV BLD AUTO: 10.4 FL (ref 6–12)
PMV BLD AUTO: 10.4 FL (ref 6–12)
PMV BLD AUTO: 10.6 FL (ref 6–12)
PMV BLD AUTO: 10.8 FL (ref 6–12)
PMV BLD AUTO: 9.1 FL (ref 6–12)
PMV BLD AUTO: 9.9 FL (ref 6–12)
PMV BLD AUTO: 9.9 FL (ref 6–12)
PO2 BLDA: 76.2 MM HG (ref 80–100)
POTASSIUM SERPL-SCNC: 4.1 MMOL/L (ref 3.5–5.2)
POTASSIUM SERPL-SCNC: 4.1 MMOL/L (ref 3.5–5.2)
POTASSIUM SERPL-SCNC: 4.2 MMOL/L (ref 3.5–5.2)
POTASSIUM SERPL-SCNC: 4.5 MMOL/L (ref 3.5–5.2)
POTASSIUM SERPL-SCNC: 4.5 MMOL/L (ref 3.5–5.2)
POTASSIUM SERPL-SCNC: 4.8 MMOL/L (ref 3.5–5.2)
POTASSIUM SERPL-SCNC: 5.2 MMOL/L (ref 3.5–5.2)
POTASSIUM SERPL-SCNC: 5.2 MMOL/L (ref 3.5–5.2)
POTASSIUM SERPL-SCNC: 5.4 MMOL/L (ref 3.5–5.2)
POTASSIUM SERPL-SCNC: 5.5 MMOL/L (ref 3.5–5.2)
POTASSIUM SERPL-SCNC: 5.5 MMOL/L (ref 3.5–5.2)
POTASSIUM SERPL-SCNC: 5.6 MMOL/L (ref 3.5–5.2)
PROT FLD-MCNC: <1 G/DL
PROT SERPL-MCNC: 5.7 G/DL (ref 6–8.5)
PROT SERPL-MCNC: 5.8 G/DL (ref 6–8.5)
PROT SERPL-MCNC: 5.8 G/DL (ref 6–8.5)
PROT SERPL-MCNC: 5.9 G/DL (ref 6–8.5)
PROT SERPL-MCNC: 5.9 G/DL (ref 6–8.5)
PROT SERPL-MCNC: 6.2 G/DL (ref 6–8.5)
PROT SERPL-MCNC: 6.3 G/DL (ref 6–8.5)
PROT SERPL-MCNC: 6.3 G/DL (ref 6–8.5)
PROT SERPL-MCNC: 6.4 G/DL (ref 6–8.5)
PROT SERPL-MCNC: 6.5 G/DL (ref 6–8.5)
PROT SERPL-MCNC: 6.6 G/DL (ref 6–8.5)
PROT UR QL STRIP: NEGATIVE
PROTHROMBIN TIME: 12.1 SECONDS (ref 12.8–15.2)
PROTHROMBIN TIME: 12.2 SECONDS (ref 12.8–15.2)
PROTHROMBIN TIME: 12.3 SECONDS (ref 12.8–15.2)
PROTHROMBIN TIME: 15.1 SECONDS (ref 11.7–14.2)
PROTHROMBIN TIME: 15.9 SECONDS (ref 11.7–14.2)
PROTHROMBIN TIME: 16 SECONDS (ref 11.7–14.2)
PROTHROMBIN TIME: 16.1 SECONDS (ref 11.7–14.2)
PROTHROMBIN TIME: 16.3 SECONDS (ref 11.7–14.2)
PROTHROMBIN TIME: 17.9 SECONDS (ref 11.7–14.2)
PROTHROMBIN TIME: 18.2 SECONDS (ref 11.7–14.2)
PROTHROMBIN TIME: 19.5 SECONDS (ref 12.1–15)
QT INTERVAL: 372 MS
RBC # BLD AUTO: 3.42 10*6/MM3 (ref 4.14–5.8)
RBC # BLD AUTO: 3.55 10*6/MM3 (ref 4.14–5.8)
RBC # BLD AUTO: 3.8 10*6/MM3 (ref 4.14–5.8)
RBC # BLD AUTO: 3.84 10*6/MM3 (ref 4.14–5.8)
RBC # BLD AUTO: 3.97 10*6/MM3 (ref 4.14–5.8)
RBC # BLD AUTO: 3.99 10*6/MM3 (ref 4.14–5.8)
RBC # BLD AUTO: 4.03 10*6/MM3 (ref 4.14–5.8)
RBC # BLD AUTO: 4.07 10*6/MM3 (ref 4.14–5.8)
RBC # BLD AUTO: 4.34 10*6/MM3 (ref 4.14–5.8)
RBC # FLD AUTO: 106 /MM3
RBC # FLD AUTO: 186 /MM3
RBC # UR: ABNORMAL /HPF
REF LAB TEST METHOD: ABNORMAL
REF LAB TEST METHOD: NORMAL
SAO2 % BLDCOA: 95.4 % (ref 92–99)
SARS-COV-2 ORF1AB RESP QL NAA+PROBE: NOT DETECTED
SARS-COV-2 RNA RESP QL NAA+PROBE: NOT DETECTED
SODIUM SERPL-SCNC: 121 MMOL/L (ref 136–145)
SODIUM SERPL-SCNC: 121 MMOL/L (ref 136–145)
SODIUM SERPL-SCNC: 122 MMOL/L (ref 136–145)
SODIUM SERPL-SCNC: 122 MMOL/L (ref 136–145)
SODIUM SERPL-SCNC: 123 MMOL/L (ref 136–145)
SODIUM SERPL-SCNC: 125 MMOL/L (ref 136–145)
SODIUM SERPL-SCNC: 129 MMOL/L (ref 136–145)
SODIUM SERPL-SCNC: 132 MMOL/L (ref 136–145)
SODIUM SERPL-SCNC: 134 MMOL/L (ref 136–145)
SODIUM SERPL-SCNC: 137 MMOL/L (ref 136–145)
SODIUM UR-SCNC: <20 MMOL/L
SP GR UR STRIP: 1.03 (ref 1–1.03)
SQUAMOUS #/AREA URNS HPF: ABNORMAL /HPF
TOTAL RATE: 22 BREATHS/MINUTE
TSH SERPL DL<=0.05 MIU/L-ACNC: 1.15 UIU/ML (ref 0.27–4.2)
TSPOT INTERPRETATION: NEGATIVE
TSPOT NIL CONTROL INTERPRETATION: NORMAL
TSPOT PANEL A: 0
TSPOT PANEL B: 0
TSPOT POS CONTROL INTERPRETATION: NORMAL
URATE SERPL-MCNC: 6.2 MG/DL (ref 3.4–7)
UROBILINOGEN UR QL STRIP: ABNORMAL
WBC # BLD AUTO: 4.08 10*3/MM3 (ref 3.4–10.8)
WBC # BLD AUTO: 4.7 10*3/MM3 (ref 3.4–10.8)
WBC # BLD AUTO: 4.79 10*3/MM3 (ref 3.4–10.8)
WBC # BLD AUTO: 5.19 10*3/MM3 (ref 3.4–10.8)
WBC # BLD AUTO: 5.32 10*3/MM3 (ref 3.4–10.8)
WBC # BLD AUTO: 5.64 10*3/MM3 (ref 3.4–10.8)
WBC # BLD AUTO: 5.71 10*3/MM3 (ref 3.4–10.8)
WBC # BLD AUTO: 5.85 10*3/MM3 (ref 3.4–10.8)
WBC # BLD AUTO: 6.67 10*3/MM3 (ref 3.4–10.8)
WBC UR QL AUTO: ABNORMAL /HPF

## 2021-01-01 PROCEDURE — P9047 ALBUMIN (HUMAN), 25%, 50ML: HCPCS | Performed by: SURGERY

## 2021-01-01 PROCEDURE — 25010000002 ALBUMIN HUMAN 25% PER 50 ML: Performed by: SURGERY

## 2021-01-01 PROCEDURE — 85610 PROTHROMBIN TIME: CPT

## 2021-01-01 PROCEDURE — 85610 PROTHROMBIN TIME: CPT | Performed by: SURGERY

## 2021-01-01 PROCEDURE — 25010000003 LIDOCAINE 1 % SOLUTION: Performed by: RADIOLOGY

## 2021-01-01 PROCEDURE — 85025 COMPLETE CBC W/AUTO DIFF WBC: CPT

## 2021-01-01 PROCEDURE — 82945 GLUCOSE OTHER FLUID: CPT | Performed by: INTERNAL MEDICINE

## 2021-01-01 PROCEDURE — 80053 COMPREHEN METABOLIC PANEL: CPT

## 2021-01-01 PROCEDURE — 83935 ASSAY OF URINE OSMOLALITY: CPT | Performed by: INTERNAL MEDICINE

## 2021-01-01 PROCEDURE — S0260 H&P FOR SURGERY: HCPCS | Performed by: SURGERY

## 2021-01-01 PROCEDURE — 74183 MRI ABD W/O CNTR FLWD CNTR: CPT

## 2021-01-01 PROCEDURE — 84443 ASSAY THYROID STIM HORMONE: CPT | Performed by: INTERNAL MEDICINE

## 2021-01-01 PROCEDURE — 83735 ASSAY OF MAGNESIUM: CPT | Performed by: INTERNAL MEDICINE

## 2021-01-01 PROCEDURE — 93010 ELECTROCARDIOGRAM REPORT: CPT | Performed by: INTERNAL MEDICINE

## 2021-01-01 PROCEDURE — 80053 COMPREHEN METABOLIC PANEL: CPT | Performed by: SURGERY

## 2021-01-01 PROCEDURE — 87205 SMEAR GRAM STAIN: CPT | Performed by: INTERNAL MEDICINE

## 2021-01-01 PROCEDURE — 25010000003 CEFAZOLIN IN DEXTROSE 2-4 GM/100ML-% SOLUTION: Performed by: SURGERY

## 2021-01-01 PROCEDURE — 80069 RENAL FUNCTION PANEL: CPT | Performed by: INTERNAL MEDICINE

## 2021-01-01 PROCEDURE — 36415 COLL VENOUS BLD VENIPUNCTURE: CPT

## 2021-01-01 PROCEDURE — 99024 POSTOP FOLLOW-UP VISIT: CPT | Performed by: SURGERY

## 2021-01-01 PROCEDURE — 82150 ASSAY OF AMYLASE: CPT | Performed by: INTERNAL MEDICINE

## 2021-01-01 PROCEDURE — 81001 URINALYSIS AUTO W/SCOPE: CPT | Performed by: SURGERY

## 2021-01-01 PROCEDURE — 87070 CULTURE OTHR SPECIMN AEROBIC: CPT | Performed by: INTERNAL MEDICINE

## 2021-01-01 PROCEDURE — 84100 ASSAY OF PHOSPHORUS: CPT

## 2021-01-01 PROCEDURE — 82105 ALPHA-FETOPROTEIN SERUM: CPT

## 2021-01-01 PROCEDURE — 96365 THER/PROPH/DIAG IV INF INIT: CPT

## 2021-01-01 PROCEDURE — 87899 AGENT NOS ASSAY W/OPTIC: CPT | Performed by: INTERNAL MEDICINE

## 2021-01-01 PROCEDURE — 25010000002 MIDAZOLAM PER 1 MG: Performed by: STUDENT IN AN ORGANIZED HEALTH CARE EDUCATION/TRAINING PROGRAM

## 2021-01-01 PROCEDURE — G0378 HOSPITAL OBSERVATION PER HR: HCPCS

## 2021-01-01 PROCEDURE — 87015 SPECIMEN INFECT AGNT CONCNTJ: CPT | Performed by: INTERNAL MEDICINE

## 2021-01-01 PROCEDURE — 80053 COMPREHEN METABOLIC PANEL: CPT | Performed by: INTERNAL MEDICINE

## 2021-01-01 PROCEDURE — 82247 BILIRUBIN TOTAL: CPT | Performed by: INTERNAL MEDICINE

## 2021-01-01 PROCEDURE — 82803 BLOOD GASES ANY COMBINATION: CPT

## 2021-01-01 PROCEDURE — 90740 HEPB VACC 3 DOSE IMMUNSUP IM: CPT | Performed by: INTERNAL MEDICINE

## 2021-01-01 PROCEDURE — 85027 COMPLETE CBC AUTOMATED: CPT | Performed by: SURGERY

## 2021-01-01 PROCEDURE — 85027 COMPLETE CBC AUTOMATED: CPT

## 2021-01-01 PROCEDURE — U0004 COV-19 TEST NON-CDC HGH THRU: HCPCS | Performed by: SURGERY

## 2021-01-01 PROCEDURE — 76942 ECHO GUIDE FOR BIOPSY: CPT

## 2021-01-01 PROCEDURE — 82570 ASSAY OF URINE CREATININE: CPT | Performed by: INTERNAL MEDICINE

## 2021-01-01 PROCEDURE — 85027 COMPLETE CBC AUTOMATED: CPT | Performed by: INTERNAL MEDICINE

## 2021-01-01 PROCEDURE — 25010000002 FENTANYL CITRATE (PF) 100 MCG/2ML SOLUTION: Performed by: STUDENT IN AN ORGANIZED HEALTH CARE EDUCATION/TRAINING PROGRAM

## 2021-01-01 PROCEDURE — 88112 CYTOPATH CELL ENHANCE TECH: CPT | Performed by: SURGERY

## 2021-01-01 PROCEDURE — 84157 ASSAY OF PROTEIN OTHER: CPT | Performed by: INTERNAL MEDICINE

## 2021-01-01 PROCEDURE — G0010 ADMIN HEPATITIS B VACCINE: HCPCS | Performed by: INTERNAL MEDICINE

## 2021-01-01 PROCEDURE — 82140 ASSAY OF AMMONIA: CPT

## 2021-01-01 PROCEDURE — 25010000002 ONDANSETRON PER 1 MG: Performed by: ANESTHESIOLOGY

## 2021-01-01 PROCEDURE — 25010000002 MIDAZOLAM PER 1 MG: Performed by: ANESTHESIOLOGY

## 2021-01-01 PROCEDURE — C1781 MESH (IMPLANTABLE): HCPCS | Performed by: SURGERY

## 2021-01-01 PROCEDURE — 25010000002 HEPATITIS B VACCINE (RECOMBINANT) 20 MCG/ML SUSPENSION: Performed by: INTERNAL MEDICINE

## 2021-01-01 PROCEDURE — 89051 BODY FLUID CELL COUNT: CPT | Performed by: INTERNAL MEDICINE

## 2021-01-01 PROCEDURE — 93005 ELECTROCARDIOGRAM TRACING: CPT

## 2021-01-01 PROCEDURE — 88305 TISSUE EXAM BY PATHOLOGIST: CPT | Performed by: SURGERY

## 2021-01-01 PROCEDURE — 85610 PROTHROMBIN TIME: CPT | Performed by: INTERNAL MEDICINE

## 2021-01-01 PROCEDURE — 25010000002 PROPOFOL 10 MG/ML EMULSION: Performed by: ANESTHESIOLOGY

## 2021-01-01 PROCEDURE — 99232 SBSQ HOSP IP/OBS MODERATE 35: CPT | Performed by: INTERNAL MEDICINE

## 2021-01-01 PROCEDURE — 86481 TB AG RESPONSE T-CELL SUSP: CPT | Performed by: INTERNAL MEDICINE

## 2021-01-01 PROCEDURE — 93975 VASCULAR STUDY: CPT

## 2021-01-01 PROCEDURE — 36600 WITHDRAWAL OF ARTERIAL BLOOD: CPT

## 2021-01-01 PROCEDURE — G0299 HHS/HOSPICE OF RN EA 15 MIN: HCPCS

## 2021-01-01 PROCEDURE — 87205 SMEAR GRAM STAIN: CPT | Performed by: SURGERY

## 2021-01-01 PROCEDURE — 99214 OFFICE O/P EST MOD 30 MIN: CPT | Performed by: INTERNAL MEDICINE

## 2021-01-01 PROCEDURE — U0004 COV-19 TEST NON-CDC HGH THRU: HCPCS | Performed by: NURSE PRACTITIONER

## 2021-01-01 PROCEDURE — 87076 CULTURE ANAEROBE IDENT EACH: CPT | Performed by: SURGERY

## 2021-01-01 PROCEDURE — 84550 ASSAY OF BLOOD/URIC ACID: CPT | Performed by: INTERNAL MEDICINE

## 2021-01-01 PROCEDURE — 84100 ASSAY OF PHOSPHORUS: CPT | Performed by: INTERNAL MEDICINE

## 2021-01-01 PROCEDURE — 0 GADOBENATE DIMEGLUMINE 529 MG/ML SOLUTION: Performed by: RADIOLOGY

## 2021-01-01 PROCEDURE — 71046 X-RAY EXAM CHEST 2 VIEWS: CPT

## 2021-01-01 PROCEDURE — 49505 PRP I/HERN INIT REDUC >5 YR: CPT | Performed by: SPECIALIST/TECHNOLOGIST, OTHER

## 2021-01-01 PROCEDURE — C9803 HOPD COVID-19 SPEC COLLECT: HCPCS | Performed by: NURSE PRACTITIONER

## 2021-01-01 PROCEDURE — 82565 ASSAY OF CREATININE: CPT

## 2021-01-01 PROCEDURE — 43235 EGD DIAGNOSTIC BRUSH WASH: CPT | Performed by: SURGERY

## 2021-01-01 PROCEDURE — 87070 CULTURE OTHR SPECIMN AEROBIC: CPT | Performed by: SURGERY

## 2021-01-01 PROCEDURE — 25010000002 ALBUMIN HUMAN 25% PER 50 ML: Performed by: INTERNAL MEDICINE

## 2021-01-01 PROCEDURE — 25010000002 HYDROMORPHONE PER 4 MG: Performed by: ANESTHESIOLOGY

## 2021-01-01 PROCEDURE — 84300 ASSAY OF URINE SODIUM: CPT | Performed by: INTERNAL MEDICINE

## 2021-01-01 PROCEDURE — 87040 BLOOD CULTURE FOR BACTERIA: CPT | Performed by: INTERNAL MEDICINE

## 2021-01-01 PROCEDURE — 25010000002 FLUCONAZOLE PER 200 MG: Performed by: ANESTHESIOLOGY

## 2021-01-01 PROCEDURE — A9577 INJ MULTIHANCE: HCPCS | Performed by: RADIOLOGY

## 2021-01-01 PROCEDURE — 49505 PRP I/HERN INIT REDUC >5 YR: CPT | Performed by: SURGERY

## 2021-01-01 PROCEDURE — 87075 CULTR BACTERIA EXCEPT BLOOD: CPT | Performed by: INTERNAL MEDICINE

## 2021-01-01 PROCEDURE — C9803 HOPD COVID-19 SPEC COLLECT: HCPCS

## 2021-01-01 PROCEDURE — P9047 ALBUMIN (HUMAN), 25%, 50ML: HCPCS | Performed by: INTERNAL MEDICINE

## 2021-01-01 PROCEDURE — 85025 COMPLETE CBC W/AUTO DIFF WBC: CPT | Performed by: INTERNAL MEDICINE

## 2021-01-01 PROCEDURE — 82042 OTHER SOURCE ALBUMIN QUAN EA: CPT | Performed by: INTERNAL MEDICINE

## 2021-01-01 PROCEDURE — 71045 X-RAY EXAM CHEST 1 VIEW: CPT

## 2021-01-01 PROCEDURE — 99255 IP/OBS CONSLTJ NEW/EST HI 80: CPT | Performed by: INTERNAL MEDICINE

## 2021-01-01 DEVICE — MESH FLUT SHT 3X6IN: Type: IMPLANTABLE DEVICE | Site: GROIN | Status: FUNCTIONAL

## 2021-01-01 DEVICE — SEAL HEMO SURG ARISTA/AH ABS/PWDR 3GM: Type: IMPLANTABLE DEVICE | Site: GROIN | Status: FUNCTIONAL

## 2021-01-01 DEVICE — CLIP LIGAT VASC HORIZON TI MD BLU 6CT: Type: IMPLANTABLE DEVICE | Site: GROIN | Status: FUNCTIONAL

## 2021-01-01 DEVICE — CLIP LIGAT VASC HORIZON TI MD/LG GRN 6CT: Type: IMPLANTABLE DEVICE | Site: GROIN | Status: FUNCTIONAL

## 2021-01-01 RX ORDER — LABETALOL HYDROCHLORIDE 5 MG/ML
5 INJECTION, SOLUTION INTRAVENOUS
Status: DISCONTINUED | OUTPATIENT
Start: 2021-01-01 | End: 2021-01-01 | Stop reason: HOSPADM

## 2021-01-01 RX ORDER — CEFAZOLIN SODIUM 2 G/100ML
2 INJECTION, SOLUTION INTRAVENOUS ONCE
Status: CANCELLED | OUTPATIENT
Start: 2021-01-01 | End: 2021-01-01

## 2021-01-01 RX ORDER — ONDANSETRON 2 MG/ML
INJECTION INTRAMUSCULAR; INTRAVENOUS AS NEEDED
Status: DISCONTINUED | OUTPATIENT
Start: 2021-01-01 | End: 2021-01-01 | Stop reason: SURG

## 2021-01-01 RX ORDER — MIDAZOLAM HYDROCHLORIDE 1 MG/ML
INJECTION INTRAMUSCULAR; INTRAVENOUS AS NEEDED
Status: DISCONTINUED | OUTPATIENT
Start: 2021-01-01 | End: 2021-01-01 | Stop reason: SURG

## 2021-01-01 RX ORDER — ALBUMIN (HUMAN) 12.5 G/50ML
12.5 SOLUTION INTRAVENOUS ONCE
Status: COMPLETED | OUTPATIENT
Start: 2021-01-01 | End: 2021-01-01

## 2021-01-01 RX ORDER — SODIUM CHLORIDE 0.9 % (FLUSH) 0.9 %
3 SYRINGE (ML) INJECTION EVERY 12 HOURS SCHEDULED
Status: CANCELLED | OUTPATIENT
Start: 2021-01-01

## 2021-01-01 RX ORDER — OXYCODONE HYDROCHLORIDE 5 MG/1
5 TABLET ORAL EVERY 6 HOURS PRN
Qty: 24 TABLET | Refills: 0 | Status: SHIPPED | OUTPATIENT
Start: 2021-01-01 | End: 2021-01-01

## 2021-01-01 RX ORDER — SODIUM CHLORIDE 0.9 % (FLUSH) 0.9 %
10 SYRINGE (ML) INJECTION AS NEEDED
Status: DISCONTINUED | OUTPATIENT
Start: 2021-01-01 | End: 2021-01-01 | Stop reason: HOSPADM

## 2021-01-01 RX ORDER — HYDROMORPHONE HYDROCHLORIDE 1 MG/ML
0.5 INJECTION, SOLUTION INTRAMUSCULAR; INTRAVENOUS; SUBCUTANEOUS
Status: DISCONTINUED | OUTPATIENT
Start: 2021-01-01 | End: 2021-01-01 | Stop reason: HOSPADM

## 2021-01-01 RX ORDER — SODIUM CHLORIDE 0.9 % (FLUSH) 0.9 %
3 SYRINGE (ML) INJECTION EVERY 12 HOURS SCHEDULED
Status: DISCONTINUED | OUTPATIENT
Start: 2021-01-01 | End: 2021-01-01 | Stop reason: HOSPADM

## 2021-01-01 RX ORDER — MIDODRINE HYDROCHLORIDE 2.5 MG/1
2.5 TABLET ORAL
Status: DISCONTINUED | OUTPATIENT
Start: 2021-01-01 | End: 2021-01-01

## 2021-01-01 RX ORDER — MORPHINE SULFATE 20 MG/ML
SOLUTION ORAL
Qty: 118 ML | Refills: 0 | Status: SHIPPED | OUTPATIENT
Start: 2021-01-01

## 2021-01-01 RX ORDER — MORPHINE SULFATE 20 MG/ML
SOLUTION ORAL
Qty: 118 ML | Refills: 0 | Status: SHIPPED | OUTPATIENT
Start: 2021-01-01 | End: 2021-01-01 | Stop reason: SDUPTHER

## 2021-01-01 RX ORDER — MIDAZOLAM HYDROCHLORIDE 1 MG/ML
1 INJECTION INTRAMUSCULAR; INTRAVENOUS ONCE
Status: COMPLETED | OUTPATIENT
Start: 2021-01-01 | End: 2021-01-01

## 2021-01-01 RX ORDER — LIDOCAINE HYDROCHLORIDE 10 MG/ML
10 INJECTION, SOLUTION INFILTRATION; PERINEURAL ONCE
Status: COMPLETED | OUTPATIENT
Start: 2021-01-01 | End: 2021-01-01

## 2021-01-01 RX ORDER — CEFAZOLIN SODIUM 2 G/100ML
2 INJECTION, SOLUTION INTRAVENOUS ONCE
Status: COMPLETED | OUTPATIENT
Start: 2021-01-01 | End: 2021-01-01

## 2021-01-01 RX ORDER — LIDOCAINE HYDROCHLORIDE 10 MG/ML
20 INJECTION, SOLUTION INFILTRATION; PERINEURAL ONCE
Status: COMPLETED | OUTPATIENT
Start: 2021-01-01 | End: 2021-01-01

## 2021-01-01 RX ORDER — EPHEDRINE SULFATE 50 MG/ML
5 INJECTION, SOLUTION INTRAVENOUS ONCE AS NEEDED
Status: DISCONTINUED | OUTPATIENT
Start: 2021-01-01 | End: 2021-01-01 | Stop reason: HOSPADM

## 2021-01-01 RX ORDER — LIDOCAINE HYDROCHLORIDE 10 MG/ML
0.5 INJECTION, SOLUTION EPIDURAL; INFILTRATION; INTRACAUDAL; PERINEURAL ONCE AS NEEDED
Status: DISCONTINUED | OUTPATIENT
Start: 2021-01-01 | End: 2021-01-01 | Stop reason: HOSPADM

## 2021-01-01 RX ORDER — CLOTRIMAZOLE 1 %
CREAM (GRAM) TOPICAL 2 TIMES DAILY
Qty: 1 G | Refills: 2 | Status: SHIPPED | OUTPATIENT
Start: 2021-01-01 | End: 2021-01-01

## 2021-01-01 RX ORDER — HYDROCODONE BITARTRATE AND ACETAMINOPHEN 7.5; 325 MG/1; MG/1
1 TABLET ORAL ONCE AS NEEDED
Status: DISCONTINUED | OUTPATIENT
Start: 2021-01-01 | End: 2021-01-01 | Stop reason: HOSPADM

## 2021-01-01 RX ORDER — MIDAZOLAM HYDROCHLORIDE 1 MG/ML
INJECTION INTRAMUSCULAR; INTRAVENOUS
Status: COMPLETED
Start: 2021-01-01 | End: 2021-01-01

## 2021-01-01 RX ORDER — NALOXONE HCL 0.4 MG/ML
0.2 VIAL (ML) INJECTION AS NEEDED
Status: DISCONTINUED | OUTPATIENT
Start: 2021-01-01 | End: 2021-01-01 | Stop reason: HOSPADM

## 2021-01-01 RX ORDER — MAGNESIUM HYDROXIDE 1200 MG/15ML
LIQUID ORAL AS NEEDED
Status: DISCONTINUED | OUTPATIENT
Start: 2021-01-01 | End: 2021-01-01 | Stop reason: HOSPADM

## 2021-01-01 RX ORDER — MIDODRINE HYDROCHLORIDE 2.5 MG/1
2.5 TABLET ORAL
Qty: 3 TABLET | Refills: 0 | Status: SHIPPED | OUTPATIENT
Start: 2021-01-01 | End: 2021-01-01 | Stop reason: HOSPADM

## 2021-01-01 RX ORDER — DIPHENHYDRAMINE HCL 25 MG
25 CAPSULE ORAL
Status: DISCONTINUED | OUTPATIENT
Start: 2021-01-01 | End: 2021-01-01 | Stop reason: HOSPADM

## 2021-01-01 RX ORDER — SODIUM CHLORIDE 0.9 % (FLUSH) 0.9 %
10 SYRINGE (ML) INJECTION AS NEEDED
Status: CANCELLED | OUTPATIENT
Start: 2021-01-01

## 2021-01-01 RX ORDER — HYDRALAZINE HYDROCHLORIDE 20 MG/ML
5 INJECTION INTRAMUSCULAR; INTRAVENOUS
Status: DISCONTINUED | OUTPATIENT
Start: 2021-01-01 | End: 2021-01-01 | Stop reason: HOSPADM

## 2021-01-01 RX ORDER — GUAIFENESIN AND CODEINE PHOSPHATE 100; 10 MG/5ML; MG/5ML
5 SOLUTION ORAL 3 TIMES DAILY PRN
Status: DISCONTINUED | OUTPATIENT
Start: 2021-01-01 | End: 2021-01-01 | Stop reason: HOSPADM

## 2021-01-01 RX ORDER — OXYCODONE HYDROCHLORIDE 5 MG/1
5 TABLET ORAL EVERY 4 HOURS PRN
Qty: 75 TABLET | Refills: 0 | Status: SHIPPED | OUTPATIENT
Start: 2021-01-01

## 2021-01-01 RX ORDER — CEPHALEXIN 500 MG/1
500 CAPSULE ORAL EVERY 8 HOURS
Qty: 21 CAPSULE | Refills: 0 | Status: SHIPPED | OUTPATIENT
Start: 2021-01-01 | End: 2021-06-18

## 2021-01-01 RX ORDER — SPIRONOLACTONE 25 MG/1
50 TABLET ORAL DAILY
COMMUNITY
End: 2021-01-01 | Stop reason: HOSPADM

## 2021-01-01 RX ORDER — MIDODRINE HYDROCHLORIDE 5 MG/1
5 TABLET ORAL
Status: DISCONTINUED | OUTPATIENT
Start: 2021-01-01 | End: 2021-01-01 | Stop reason: HOSPADM

## 2021-01-01 RX ORDER — ALBUMIN (HUMAN) 12.5 G/50ML
12.5 SOLUTION INTRAVENOUS ONCE
Status: CANCELLED | OUTPATIENT
Start: 2021-01-01 | End: 2021-01-01

## 2021-01-01 RX ORDER — SODIUM CHLORIDE 9 MG/ML
100 INJECTION, SOLUTION INTRAVENOUS CONTINUOUS
Status: DISCONTINUED | OUTPATIENT
Start: 2021-01-01 | End: 2021-01-01

## 2021-01-01 RX ORDER — SPIRONOLACTONE 50 MG/1
50 TABLET, FILM COATED ORAL DAILY
Status: DISCONTINUED | OUTPATIENT
Start: 2021-01-01 | End: 2021-01-01

## 2021-01-01 RX ORDER — URSODIOL 250 MG/1
500 TABLET, FILM COATED ORAL DAILY
Status: DISCONTINUED | OUTPATIENT
Start: 2021-01-01 | End: 2021-01-01

## 2021-01-01 RX ORDER — ALBUMIN (HUMAN) 12.5 G/50ML
25 SOLUTION INTRAVENOUS ONCE
Status: COMPLETED | OUTPATIENT
Start: 2021-01-01 | End: 2021-01-01

## 2021-01-01 RX ORDER — ALBUMIN (HUMAN) 12.5 G/50ML
12.5 SOLUTION INTRAVENOUS ONCE
Status: DISCONTINUED | OUTPATIENT
Start: 2021-01-01 | End: 2021-01-01

## 2021-01-01 RX ORDER — PROPOFOL 10 MG/ML
VIAL (ML) INTRAVENOUS CONTINUOUS PRN
Status: DISCONTINUED | OUTPATIENT
Start: 2021-01-01 | End: 2021-01-01 | Stop reason: SURG

## 2021-01-01 RX ORDER — OXYCODONE AND ACETAMINOPHEN 7.5; 325 MG/1; MG/1
1 TABLET ORAL ONCE AS NEEDED
Status: DISCONTINUED | OUTPATIENT
Start: 2021-01-01 | End: 2021-01-01 | Stop reason: HOSPADM

## 2021-01-01 RX ORDER — URSODIOL 500 MG/1
500 TABLET, FILM COATED ORAL DAILY
COMMUNITY
Start: 2021-01-01

## 2021-01-01 RX ORDER — SODIUM CHLORIDE 0.9 % (FLUSH) 0.9 %
10 SYRINGE (ML) INJECTION EVERY 12 HOURS SCHEDULED
Status: DISCONTINUED | OUTPATIENT
Start: 2021-01-01 | End: 2021-01-01 | Stop reason: HOSPADM

## 2021-01-01 RX ORDER — FUROSEMIDE 20 MG/1
20-40 TABLET ORAL DAILY
COMMUNITY

## 2021-01-01 RX ORDER — SODIUM CHLORIDE 0.9 % (FLUSH) 0.9 %
3-10 SYRINGE (ML) INJECTION AS NEEDED
Status: DISCONTINUED | OUTPATIENT
Start: 2021-01-01 | End: 2021-01-01 | Stop reason: HOSPADM

## 2021-01-01 RX ORDER — HYDROMORPHONE HCL 110MG/55ML
PATIENT CONTROLLED ANALGESIA SYRINGE INTRAVENOUS AS NEEDED
Status: DISCONTINUED | OUTPATIENT
Start: 2021-01-01 | End: 2021-01-01 | Stop reason: SURG

## 2021-01-01 RX ORDER — ONDANSETRON 2 MG/ML
4 INJECTION INTRAMUSCULAR; INTRAVENOUS ONCE AS NEEDED
Status: DISCONTINUED | OUTPATIENT
Start: 2021-01-01 | End: 2021-01-01 | Stop reason: HOSPADM

## 2021-01-01 RX ORDER — FLUMAZENIL 0.1 MG/ML
0.2 INJECTION INTRAVENOUS AS NEEDED
Status: DISCONTINUED | OUTPATIENT
Start: 2021-01-01 | End: 2021-01-01 | Stop reason: HOSPADM

## 2021-01-01 RX ORDER — PROMETHAZINE HYDROCHLORIDE 25 MG/1
25 SUPPOSITORY RECTAL ONCE AS NEEDED
Status: DISCONTINUED | OUTPATIENT
Start: 2021-01-01 | End: 2021-01-01 | Stop reason: HOSPADM

## 2021-01-01 RX ORDER — CHLORHEXIDINE GLUCONATE 500 MG/1
1 CLOTH TOPICAL TAKE AS DIRECTED
COMMUNITY
End: 2021-01-01 | Stop reason: HOSPADM

## 2021-01-01 RX ORDER — SODIUM CHLORIDE, SODIUM LACTATE, POTASSIUM CHLORIDE, CALCIUM CHLORIDE 600; 310; 30; 20 MG/100ML; MG/100ML; MG/100ML; MG/100ML
9 INJECTION, SOLUTION INTRAVENOUS CONTINUOUS
Status: DISCONTINUED | OUTPATIENT
Start: 2021-01-01 | End: 2021-01-01 | Stop reason: HOSPADM

## 2021-01-01 RX ORDER — FENTANYL CITRATE 50 UG/ML
25 INJECTION, SOLUTION INTRAMUSCULAR; INTRAVENOUS
Status: DISCONTINUED | OUTPATIENT
Start: 2021-01-01 | End: 2021-01-01 | Stop reason: HOSPADM

## 2021-01-01 RX ORDER — FLUCONAZOLE 2 MG/ML
INJECTION, SOLUTION INTRAVENOUS AS NEEDED
Status: DISCONTINUED | OUTPATIENT
Start: 2021-01-01 | End: 2021-01-01 | Stop reason: SURG

## 2021-01-01 RX ORDER — LACTULOSE 10 G/15ML
20 SOLUTION ORAL 3 TIMES DAILY
Qty: 946 ML | Refills: 0 | Status: SHIPPED | OUTPATIENT
Start: 2021-01-01 | End: 2021-01-01

## 2021-01-01 RX ORDER — URSODIOL 250 MG/1
500 TABLET, FILM COATED ORAL 2 TIMES DAILY
Status: DISCONTINUED | OUTPATIENT
Start: 2021-01-01 | End: 2021-01-01 | Stop reason: HOSPADM

## 2021-01-01 RX ORDER — CLINDAMYCIN HYDROCHLORIDE 150 MG/1
300 CAPSULE ORAL 3 TIMES DAILY
Qty: 40 CAPSULE | Refills: 0 | Status: SHIPPED | OUTPATIENT
Start: 2021-01-01 | End: 2021-01-01

## 2021-01-01 RX ORDER — GUAIFENESIN AND CODEINE PHOSPHATE 100; 10 MG/5ML; MG/5ML
5 SOLUTION ORAL 3 TIMES DAILY PRN
Qty: 473 ML | Refills: 0 | Status: SHIPPED | OUTPATIENT
Start: 2021-01-01

## 2021-01-01 RX ORDER — MIDODRINE HYDROCHLORIDE 5 MG/1
5 TABLET ORAL
Qty: 90 TABLET | Refills: 0 | Status: SHIPPED | OUTPATIENT
Start: 2021-01-01 | End: 2021-07-10

## 2021-01-01 RX ORDER — DIPHENHYDRAMINE HYDROCHLORIDE 50 MG/ML
12.5 INJECTION INTRAMUSCULAR; INTRAVENOUS
Status: DISCONTINUED | OUTPATIENT
Start: 2021-01-01 | End: 2021-01-01 | Stop reason: HOSPADM

## 2021-01-01 RX ORDER — PROMETHAZINE HYDROCHLORIDE 25 MG/1
25 TABLET ORAL ONCE AS NEEDED
Status: DISCONTINUED | OUTPATIENT
Start: 2021-01-01 | End: 2021-01-01 | Stop reason: HOSPADM

## 2021-01-01 RX ORDER — FAMOTIDINE 10 MG/ML
20 INJECTION, SOLUTION INTRAVENOUS ONCE
Status: COMPLETED | OUTPATIENT
Start: 2021-01-01 | End: 2021-01-01

## 2021-01-01 RX ORDER — ALBUMIN (HUMAN) 12.5 G/50ML
12.5 SOLUTION INTRAVENOUS ONCE
Status: CANCELLED | OUTPATIENT
Start: 2021-01-01

## 2021-01-01 RX ORDER — BUPIVACAINE HYDROCHLORIDE AND EPINEPHRINE 5; 5 MG/ML; UG/ML
INJECTION, SOLUTION PERINEURAL AS NEEDED
Status: DISCONTINUED | OUTPATIENT
Start: 2021-01-01 | End: 2021-01-01 | Stop reason: HOSPADM

## 2021-01-01 RX ORDER — LIDOCAINE HYDROCHLORIDE 10 MG/ML
20 INJECTION, SOLUTION INFILTRATION; PERINEURAL ONCE
Status: DISCONTINUED | OUTPATIENT
Start: 2021-01-01 | End: 2021-01-01 | Stop reason: HOSPADM

## 2021-01-01 RX ORDER — PROPOFOL 10 MG/ML
VIAL (ML) INTRAVENOUS AS NEEDED
Status: DISCONTINUED | OUTPATIENT
Start: 2021-01-01 | End: 2021-01-01 | Stop reason: SURG

## 2021-01-01 RX ORDER — FENTANYL CITRATE 50 UG/ML
50 INJECTION, SOLUTION INTRAMUSCULAR; INTRAVENOUS
Status: DISCONTINUED | OUTPATIENT
Start: 2021-01-01 | End: 2021-01-01 | Stop reason: HOSPADM

## 2021-01-01 RX ADMIN — MIDAZOLAM 2 MG: 1 INJECTION INTRAMUSCULAR; INTRAVENOUS at 12:20

## 2021-01-01 RX ADMIN — FENTANYL CITRATE 25 MCG: 50 INJECTION, SOLUTION INTRAMUSCULAR; INTRAVENOUS at 11:41

## 2021-01-01 RX ADMIN — LIDOCAINE HYDROCHLORIDE 10 ML: 10 INJECTION, SOLUTION INFILTRATION; PERINEURAL at 07:59

## 2021-01-01 RX ADMIN — SODIUM CHLORIDE 100 ML/HR: 9 INJECTION, SOLUTION INTRAVENOUS at 21:30

## 2021-01-01 RX ADMIN — PROPOFOL 50 MG: 10 INJECTION, EMULSION INTRAVENOUS at 13:57

## 2021-01-01 RX ADMIN — ONDANSETRON 4 MG: 2 INJECTION INTRAMUSCULAR; INTRAVENOUS at 14:13

## 2021-01-01 RX ADMIN — MIDAZOLAM 1 MG: 1 INJECTION INTRAMUSCULAR; INTRAVENOUS at 13:38

## 2021-01-01 RX ADMIN — Medication 3 ML: at 07:32

## 2021-01-01 RX ADMIN — NOREPINEPHRINE BITARTRATE 0.03 MCG/KG/MIN: 1 INJECTION, SOLUTION, CONCENTRATE INTRAVENOUS at 13:59

## 2021-01-01 RX ADMIN — SODIUM CHLORIDE, POTASSIUM CHLORIDE, SODIUM LACTATE AND CALCIUM CHLORIDE 9 ML/HR: 600; 310; 30; 20 INJECTION, SOLUTION INTRAVENOUS at 11:21

## 2021-01-01 RX ADMIN — ALBUMIN HUMAN 25 G: 0.25 SOLUTION INTRAVENOUS at 13:54

## 2021-01-01 RX ADMIN — ALBUMIN (HUMAN) 12.5 G: 12.5 SOLUTION INTRAVENOUS at 08:35

## 2021-01-01 RX ADMIN — ALBUMIN HUMAN 12.5 G: 0.25 SOLUTION INTRAVENOUS at 16:25

## 2021-01-01 RX ADMIN — LIDOCAINE HYDROCHLORIDE 10 ML: 10 INJECTION, SOLUTION INFILTRATION; PERINEURAL at 12:49

## 2021-01-01 RX ADMIN — MIDODRINE HYDROCHLORIDE 5 MG: 5 TABLET ORAL at 11:18

## 2021-01-01 RX ADMIN — SODIUM CHLORIDE, PRESERVATIVE FREE 10 ML: 5 INJECTION INTRAVENOUS at 08:36

## 2021-01-01 RX ADMIN — PROPOFOL 75 MCG/KG/MIN: 10 INJECTION, EMULSION INTRAVENOUS at 13:57

## 2021-01-01 RX ADMIN — FLUCONAZOLE 200 MG: 2 INJECTION, SOLUTION INTRAVENOUS at 14:16

## 2021-01-01 RX ADMIN — GADOBENATE DIMEGLUMINE 20 ML: 529 INJECTION, SOLUTION INTRAVENOUS at 10:44

## 2021-01-01 RX ADMIN — LIDOCAINE HYDROCHLORIDE 10 ML: 10 INJECTION, SOLUTION INFILTRATION; PERINEURAL at 09:09

## 2021-01-01 RX ADMIN — FAMOTIDINE 20 MG: 10 INJECTION INTRAVENOUS at 11:41

## 2021-01-01 RX ADMIN — HYDROMORPHONE HYDROCHLORIDE 0.5 MG: 2 INJECTION, SOLUTION INTRAMUSCULAR; INTRAVENOUS; SUBCUTANEOUS at 13:57

## 2021-01-01 RX ADMIN — SODIUM CHLORIDE, POTASSIUM CHLORIDE, SODIUM LACTATE AND CALCIUM CHLORIDE: 600; 310; 30; 20 INJECTION, SOLUTION INTRAVENOUS at 15:34

## 2021-01-01 RX ADMIN — SODIUM CHLORIDE, PRESERVATIVE FREE 10 ML: 5 INJECTION INTRAVENOUS at 16:25

## 2021-01-01 RX ADMIN — ALBUMIN (HUMAN) 12.5 G: 12.5 SOLUTION INTRAVENOUS at 14:45

## 2021-01-01 RX ADMIN — Medication 3 ML: at 15:23

## 2021-01-01 RX ADMIN — CEFAZOLIN SODIUM 2 G: 2 INJECTION, SOLUTION INTRAVENOUS at 14:02

## 2021-01-01 RX ADMIN — SODIUM CHLORIDE, PRESERVATIVE FREE 10 ML: 5 INJECTION INTRAVENOUS at 21:30

## 2021-01-01 RX ADMIN — LIDOCAINE HYDROCHLORIDE 10 ML: 10 INJECTION, SOLUTION INFILTRATION; PERINEURAL at 14:48

## 2021-01-01 RX ADMIN — SODIUM CHLORIDE 1000 ML: 9 INJECTION, SOLUTION INTRAVENOUS at 17:19

## 2021-01-01 RX ADMIN — CEFAZOLIN SODIUM 1 G: 2 INJECTION, SOLUTION INTRAVENOUS at 15:08

## 2021-01-01 RX ADMIN — HEPATITIS B VACCINE (RECOMBINANT) 20 MCG: 20 INJECTION, SUSPENSION INTRAMUSCULAR at 17:35

## 2021-01-20 NOTE — PROGRESS NOTES
Subjective Discussed recent findings  REASON FOR FOLLOW-UP: Hepatocellular carcinoma        History of Present Illness          The patient is a 75-year-old male with a history of hypertension, B12 deficiency, elevated hemidiaphragm with previous assessment by pulmonary medicine with initial review by cardiology in May 2016 for dyspnea and also unspecified hepatic cirrhosis.  Assessments had included CT of chest May 26, 2016 with significant elevation of left hemidiaphragm at the level of the love with mass-effect on the mediastinum to the right, compressive atelectatic change left lower lobe and lingula, pulmonary arteries enlarged with the main pulmonary artery measuring 3.5 cm, ectasia of the ascending thoracic aorta measuring 4.2 cm and evidence of a cirrhotic liver with diffusely nodular and lobular liver contour with significant enlargement left hepatic lobe.  The spleen was not enlarged and there is no evidence of ascites though there was suprahepatic IVC dilation.  This cirrhosis has been assessed by CT-guided biopsy the liver in September 2016 pathology revealing patchy mild portal chronic inflammatory change without significant steatosis or lobular inflammation, trichrome and reticulin stains with mild periportal fibrosis, iron stains negative.     He had follow-up with primary care was seen in 2017 by neurosurgery when he developed focal weakness starting in June 2017 with facial left-sided focality noted, associated dizziness and fatigue.  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  An MRI of the brain was obtained MRI of the brain and revealed no definite acute or subacute intracranial abnormality, evidence of chronic small vessel disease, tortuosity of the intracranial arteries from chronic hypertension, mild effacement of the left 3rd cranial nerve as the etiology of ptosis and additional vascular  impingement involving intracranial right optic nerve, bilateral mamillary bodies and possible proximal cisternal left 7th/8th cranial nerves of unknown clinical significance.  This was reviewed by neurosurgery with a large bone spur on the left side at L3-4 compressing the C3 and C4 nerves thus producing the phrenic nerve paralysis.  Is not felt that this would change with surgical intervention.  Neurologic assessment proceeded for possible myasthenia gravis with negative findings         The patient is next seen by GI medicine March 2, 2020 for elevated liver function tests and laboratory studies as well as additional radiologic studies were obtained.  CT scan of the abdomen demonstrated progression of cirrhotic liver morphology with more prominent varices, splenic size increased in size and 1.4 cm hyperenhancing left hepatic lobe lesion thought to represent a regenerating nodule?  Hepatocellular carcinoma, nonspecific increase in a small node involving the elvin hepatis and no change in left hemidiaphragmatic elevation.  Duplex studies portal circulation revealed no evidence of acute or chronic thrombosis normal flow directions.  Additional laboratory studies including ALBIN, alpha-1 antitrypsin, antimicrosomal antibodies and anti-smooth muscle were negative.  Celiac panel, ceruloplasmin, hepatitis profile negative though monoclonal antibodies were slightly elevated 23.2.  Additional markers for fetoprotein, CEA and CA 19-9 were normal.       CTP score of 5-class A, meld score of 13         A follow-up CT scan of the abdomen performed June 26, 2020 revealed an increase in the size of the hyperenhancing 1.6 x 1.1 medial hepatic segment nodule now up to 1.8 cm suspicious for dysplastic nodule hepatocellular carcinoma, 1 cm hyperenhancing focus in lateral panic segment is indeterminant, stable nodes in the elvin hepatis and stable elevation of left hemidiaphragm.  A follow-up MRI shows cirrhotic appearance of the liver  with a well defined lesion with a medial hepatic segment measuring 3.7 x 3.4.  There is a questionable lesion in the lateral hepatic  segment of the previous exam is not identified with no evidence of biliary dilatation and a 2 cm node elvin hepatis seen on the previous CT scan is not defined secondary to breathing artifact.     These findings are discussed with radiology July 2020 with the recommendation to pay closer attention to the CT scanning rather than to the MRI with the latter suggesting some degree of regeneration around the known increasing medial hepatic lesion.  As result the patient staging would be  [id number] MX by TNM staging and albumin-bilirubin (ALBl) score 0.67.       We were contacted about this patient prior to his visit July 9, 2020 as he had not been felt a candidate for surgery secondary to respiratory compromise and been advised to proceed with stereotactic radiation therapy to the liver lesion in question.    As he is seen with his wife July 9, 2020 they have just been assessed by Dr. Nadir Gonzalez at [facility] and advised to proceed with stereotactic radiation therapy which will likely proceed over the next 1 to 2 weeks.  Symptomatically the patient feels well having improved respiratorily with the use of additional respirator at home and diuretic therapy through his cardiologist.    It was elected to have him undergo a PET/CT examination and liquid biopsy as well as additional laboratory studies.  The studies have included a normal pro time with an INR of 1.09, PTT of 35.6, normal fibrinogen, normal AFP, transaminases with ALT of 46 AST of 75, alk phos of 163 and total bilirubin of 1.6.  Further the patient's PET/CT fails to show any hypermetabolic liver lesion and no hypermetabolic lymphadenopathy within the abdomen though this considers the fact that hepatocellular carcinoma typically has low FDG avidity.  There is no other suspicious activity in the neck, chest, abdomen or  pelvis.  The patient is contacted July 21 by telephone having just been seen by Dr. Ian Sandoval radiation therapy at [facility].  The patient is not a candidate for stereotactic radiation given over approximately a week's time with treatment planning scheduled within the next week.  Is agreeable that a follow-up MRI examination to compare to previous would be done a month after treatment is completed.     Patient had follow-up visits with cardiology August 28, 2020 improving on spironolactone.  He was seen by GI medicine August 18, 2020 having recently proceeded through radiation therapy with some degree of fatigue.  Patient Chase he was treated, August 11 and August 13, 2020.  Plans were made for EGD for evaluation of varices and colonoscopy for screening and plans to reinstitute surveillance of cirrhosis without fetoprotein imaging every 6 months.  Patient seen in office August 25, 2020 with pain surrounding the hepatic capsule that developed to start radiation therapy, now resolved, stable weight and appetite and generally good performance status.  He is to have a follow-up MRI in mid October, reviewed by radiation therapy and reassessment here shortly thereafter as discussed.    The patient did proceed with a follow-up MRI of the abdomen obtained October 15 showing a decrease in the medial hepatic segment mass measuring 3.4 x 2.8 previously 3.7 x 3.4.  There is no enhancement on the early postcontrast arterial phase and no change in appearance of the liver which is grossly cirrhotic.  The portal vein, splenic vein and SMV are patent.  At this point there are plans for him to undergo follow-up scans in approximately 3 months in mid January.  Fortunately he is not otherwise symptomatic with stable weight, appetite and performance status.  And a fetoprotein level is currently pending.        The patient had follow-up examination with MRI of the abdomen January 18, 2021 showing a further reduction in  the medial hepatic segment mass measuring 3.1 x 2 0.6 x 3.7 x 2.8, development of moderate large volume ascites throughout the abdomen, main portal veins, splenic vein and SMV patent, right portal vein chronically diminutive, omentum congested, varices within the omentum and peritoneum more prominent, no definite lymphadenopathy.  Additional laboratory studies include a normal serum ammonia, PT/INR 1.31, AFP of 4.74 and CMP with BUN/creatinine 22/.93, albumin 2.9, ALT 47, AST of 86, alk phos 209 and total bilirubin 3.3.  He has been evaluated by both cardiology and nephrology at this point with hypertensive medications adjusted, additional diuretics given and with reduction of ascites and, to a lesser extent, degree of lower extremity edema that have been progressing in the last several weeks.     The patient has follow-up with transplant physicians at [facility] at the end of this week.  We have discussed that, at present, we do not find evidence of recurrent malignancy but are seriously concerned about progression of his liver dysfunction with further complications expected within the year.      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   • Vitamin B 12 deficiency         Past Surgical History:   Procedure Laterality Date   • CARDIAC CATHETERIZATION N/A 6/3/2016    Procedure: Coronary angiography;  Surgeon: Александр Man MD;  Location: [facility] INVASIVE LOCATION;  Service:    • CARDIAC CATHETERIZATION N/A 6/3/2016    Procedure: Right and Left Heart Cath;  Surgeon: Александр Man MD;  Location: [facility] INVASIVE LOCATION;   Service:    • CARDIAC CATHETERIZATION N/A 6/3/2016    Procedure: Left ventriculography;  Surgeon: Александр Man MD;  Location: [facility] INVASIVE LOCATION;  Service:    • NOSE SURGERY          Current Outpatient Medications on File Prior to Visit   Medication Sig Dispense Refill   • Cyanocobalamin 1000 MCG/ML kit Inject 1,000 mcg as directed every 3 (three) months.     • benazepril (Lotensin) 20 MG tablet Take 1 tablet by mouth 2 (two) times a day. 180 tablet 1   • doxycycline (PERIOSTAT) 20 MG tablet      • spironolactone (ALDACTONE) 25 MG tablet Take 1 tablet by mouth Daily. 90 tablet 1     No current facility-administered medications on file prior to visit.         ALLERGIES:  No Known Allergies     Social History     Socioeconomic History   • Marital status:      Spouse name: Jan   • Number of children: 0   • Years of education: Not on file   • Highest education level: Not on file   Occupational History   • Occupation:      Employer: [employer]   Tobacco Use   • Smoking status: Never Smoker   • Smokeless tobacco: Never Used   Substance and Sexual Activity   • Alcohol use: Yes     Comment: rare/  Daily caffeine use   • Drug use: No   • Sexual activity: Yes     Partners: Female        Family History   Problem Relation Age of Onset   • Heart attack Father    • Cancer Father    • Alcohol abuse Father    • Dementia Father    • Colon cancer Neg Hx    • Colon polyps Neg Hx       Review of systems unchanged from previous assessed July 21, 2020  Review of Systems   Constitutional: Positive for activity change and fatigue. Negative for unexpected weight change.   HENT: Negative.    Respiratory: Positive for shortness of breath.    Cardiovascular: Positive for leg swelling.   Gastrointestinal: Negative.    Genitourinary: Negative.    Musculoskeletal: Positive for arthralgias.   Skin: Negative.    Neurological: Negative.    Psychiatric/Behavioral: Negative.         Objective  "    Vitals:    01/26/21 1440   BP: 105/70   Pulse: 66   Resp: 18   Temp: 97.3 °F (36.3 °C)   TempSrc: Temporal   SpO2: 97%   Weight: 97.9 kg (215 lb 12.8 oz)   Height: 180.3 cm (70.98\")   PainSc: 0-No pain     Current Status 1/26/2021   ECOG score 0     Physical exam not performed, below is from July 9, 2020  Physical Exam   Constitutional: He is oriented to person, place, and time. He appears well-developed.   HENT:   Head: Normocephalic and atraumatic.   Eyes: Pupils are equal, round, and reactive to light. Conjunctivae are normal.   Neck: Normal range of motion. Neck supple.   Cardiovascular: Normal rate, regular rhythm and normal heart sounds.   Pulmonary/Chest: Effort normal.   Absent breath sounds left lung base to apex   Abdominal: Soft. Bowel sounds are normal. He exhibits no distension and no mass. There is no abdominal tenderness. There is no guarding.   Musculoskeletal: Normal range of motion.      Right lower leg: Edema present.      Left lower leg: No edema.      Comments: The patient's edema is worsening from previous   Neurological: He is alert and oriented to person, place, and time.   Skin: Skin is warm and dry.   Psychiatric: His behavior is normal. Thought content normal.         RECENT LABS:  Hematology WBC   Date Value Ref Range Status   01/19/2021 4.08 3.40 - 10.80 10*3/mm3 Final   06/11/2020 3.90 3.40 - 10.80 10*3/mm3 Final     RBC   Date Value Ref Range Status   01/19/2021 3.42 (L) 4.14 - 5.80 10*6/mm3 Final   06/11/2020 4.16 4.14 - 5.80 10*6/mm3 Final     Hemoglobin   Date Value Ref Range Status   01/19/2021 12.2 (L) 13.0 - 17.7 g/dL Final     Hematocrit   Date Value Ref Range Status   01/19/2021 35.8 (L) 37.5 - 51.0 % Final     Platelets   Date Value Ref Range Status   01/19/2021 122 (L) 140 - 450 10*3/mm3 Final      F-18 FDG PET FROM SKULL BASE TO MID THIGH WITH PET/CT FUSION 7/16/2020    FINDINGS: The liver has a heterogeneous speckled pattern of activity.  There is no hypermetabolic " activity corresponding to the approximately  3.7 x 3.4 cm mass at the medial hepatic segment seen on the recent MRI.  There is no hypermetabolic lymphadenopathy within the abdomen. There is  no suspicious hypermetabolic activity within the abdomen or pelvis.  There is no suspicious hypermetabolic activity within the chest or neck.     IMPRESSION:  1. There is no hypermetabolic liver lesion and there is no  hypermetabolic lymphadenopathy within the abdomen. Hepatocellular  carcinoma typically has low FDG avidity and the lesion at the medial  hepatic segment has low FDG avidity.  2. There is no suspicious hypermetabolic activity within the neck,  chest, abdomen, or pelvis.     This report was finalized on 7/17/2020 3:10 PM by Dr. Rosemarie Dominguez M.D.        MRI OF THE LIVER WITH AND WITHOUT CONTRAST January 2, 2021    FINDINGS: There has been slight interval decrease in the size of the  medial hepatic segment mass measuring approximately 3.1 x 2.6 cm,  previously 3.7 x 2.8 cm. The heterogeneous appearance on the precontrast  series with internal heterogeneous signal is unchanged. There is very  mild internal hyperenhancement on the early postcontrast phase. The  liver is grossly cirrhotic and very heterogeneous diffusely. Liver  volume appears slightly smaller than previously. There has been  development of a moderately large volume of ascites throughout the  abdomen. The main portal vein, splenic vein, and visualized SMV are  patent. The right portal vein is chronically diminutive within the  atrophied right hepatic lobe. Left portal vein is patent. The omentum  appears congested. The varices within the omentum and along the  peritoneum appear more prominent. No definite pathologic lymphadenopathy  is seen within the abdomen. Elevation of the left hemidiaphragm appears  largely unchanged. There is no acute abnormality involving the  gallbladder, pancreas, spleen, adrenals, or kidneys.     IMPRESSION:  1. There has been  slight interval decrease in size of the 3.1 x 2.6 cm  medial hepatic segment mass. No definite new lesions are seen.  2. Development of a moderately large volume of ascites. Some of the  varices appear more prominent likely secondary to progression of portal  hypertension.    Assessment/Plan        The patient is a 75-year-old male with a history of hypertension, B12 deficiency, 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  He has not had additional therapeutic intervention for his elevated hemidiaphragm up to this point though this is now felt to be an option.  He had recently been seen by GI medicine in March with elevated liver function tests and CT scan of the abdomen demonstrated progression of cirrhotic liver morphology with more prominent varices, splenic size and 1.4 cm hyperenhancing left hepatic lobe lesion that is been somewhat suspicious leading to laboratory studies negative including alpha-fetoprotein, CEA and CA 19-9 levels.     A follow-up CT scan of the abdomen in late June demonstrates an increase in hyperenhancing medial hepatic segment nodule with a 1 cm additional focus in the lateral hepatic segment.  A follow-up MRI demonstrates a cirrhotic appearance of the liver with a defined lesion in the medial hepatic segment measuring 3.7 x 3.4 with no other clear abnormalities present.  The patient has a LI- RAD category 5 lesion after discussions with radiology and has had assessments at [facility] surgical oncology.  He is not felt to be an operative candidate and not a transplant candidate at this point.        We have discussed in the interval proceeding to PET/CT and liquid biopsy to assess any other sites of potential metastasis and whether we might learn indirectly about the patient's lesion diagnostically and/or therapeutically.  He has now been offered  stereotactic radiation therapy at [facility].  After local therapy if he demonstrates progression of disease systemic therapy options include sorafenib or regorfenib versus lapatinib versus a combination of atezolizumab plus bevacizumab as first-line and second line nivolumab.  The patient was further assessed with foundation liquid which is currently pending at the time of this dictation when the patient is contacted by telephone July 21, 2020.  Additional laboratory studies were otherwise unremarkable except mild elevation of transaminases.  At this point the patient is been assessed by radiation therapy at [facility] and is a candidate for stereotactic radiation within the next 2 weeks.  It is agreed that this would proceed.  Plan also to review the patient's pulmonary function testing as we try to determine whether he will proceed with diaphragmatic plication at a later point.  After discussion it was agreed the patient will continue with radiation therapy plan to Dr. Ian Sandoval, follow-up with MRI after his procedure and pains results again reassess for thoracic surgery to consider diaphragmatic plication.   Fortunately as he is assessed August 25 he is recovering from the acute effects of stereotactic radiation therapy and has an excellent performance status overall.    The patient is next reassessed October 27, 2020 with recent MRI of abdomen October 15 demonstrating a clear response to therapy.  We discussed follow-up examinations now scheduled in January as well as repeat AFP now pending.    The patient is next assessed January 26, 2021 having recently developed worsening lower extremity edema and undergone follow-up MRI of the abdomen January 10 demonstrating decrease in the patient's medial hepatic segment mass development of large volume ascites as well as progression of portal hypertension.  He has been assessed by both nephrology and cardiology with adjustment of hypertensive  medications and additional diuretics given to some improvement but not resolution of his lower extremity edema.  These findings, as well as his laboratory exams, are concerning for progressive liver dysfunction.  He had additionally had a CT guided liver biopsy done in 2016 that did not confirm cirrhosis?  Though his radiologic studies have this appearance.      We have discussed that the patient should be evaluated for transplantation which is expected to include input from hepatology.  It is understood that if he proceeds with transplant and he would later be a candidate for diaphragmatic surgery as well.       Plan:    *Assessment by [facility] transplant service this coming Friday    *Follow-up in 2 months at approximately the same time he is also scheduled to be seen back likely by radiation therapy and undergo repeat radiologic studies.    *I have asked Dr. Christian to contact me about the transplant assessment this week.

## 2021-03-08 PROBLEM — K40.90 RIGHT INGUINAL HERNIA: Status: ACTIVE | Noted: 2021-01-01

## 2021-03-08 NOTE — DISCHARGE INSTRUCTIONS
Take the following medications the morning of surgery: NONE. WILL BRING BLOOD PRESSURE MED DAY OF SURGERY. UNSURE OF MED AT TIME OF PAT      If you are on prescription narcotic pain medication to control your pain you may also take that medication the morning of surgery.    General Instructions:  • Do not eat solid food after midnight the night before surgery.  • You may drink clear liquids day of surgery but must stop at least one hour before your hospital arrival time.  • It is beneficial for you to have a clear drink that contains carbohydrates the day of surgery.  We suggest a 12 to 20 ounce bottle of Gatorade or Powerade for non-diabetic patients or a 12 to 20 ounce bottle of G2 or Powerade Zero for diabetic patients.    Clear liquids are liquids you can see through.  Nothing red in color.     Plain water                               Sports drinks  Sodas                                   Gelatin (Jell-O)  Fruit juices without pulp such as white grape juice and apple juice  Popsicles that contain no fruit or yogurt  Tea or coffee (no cream or milk added)  Gatorade / Powerade  G2 / Powerade Zero    • If applicable bring your C-PAP/ BI-PAP machine.  • Bring any papers given to you in the doctor’s office.  • Wear clean comfortable clothes.  • Do not wear contact lenses, false eyelashes or make-up.  Bring a case for your glasses.   • Bring crutches or walker if applicable.  • Remove all piercings.  Leave jewelry and any other valuables at home.  • The Pre-Admission Testing nurse will instruct you to bring medications if unable to obtain an accurate list in Pre-Admission Testing.            Preventing a Surgical Site Infection:  • For 2 to 3 days before surgery, avoid shaving with a razor because the razor can irritate skin and make it easier to develop an infection.    • Any areas of open skin can increase the risk of a post-operative wound infection by allowing bacteria to enter and travel throughout the body.   Notify your surgeon if you have any skin wounds / rashes even if it is not near the expected surgical site.  The area will need assessed to determine if surgery should be delayed until it is healed.  • The night prior to surgery shower using a fresh bar of anti-bacterial soap (such as Dial) and clean washcloth.  Sleep in a clean bed with clean clothing.  Do not allow pets to sleep with you.  • Shower on the morning of surgery using a fresh bar of anti-bacterial soap (such as Dial) and clean washcloth.  Dry with a clean towel and dress in clean clothing.  • Ask your surgeon if you will be receiving antibiotics prior to surgery.  • Make sure you, your family, and all healthcare providers clean their hands with soap and water or an alcohol based hand  before caring for you or your wound.    Day of surgery: 3/10/2021. [facility]. ARRIVAL TIME 1100  Your arrival time is approximately two hours before your scheduled surgery time.  Upon arrival, a Pre-op nurse and Anesthesiologist will review your health history, obtain vital signs, and answer questions you may have.  The only belongings needed at this time will be a list of your home medications and if applicable your C-PAP/BI-PAP machine.  A Pre-op nurse will start an IV and you may receive medication in preparation for surgery, including something to help you relax.     Please be aware that surgery does come with discomfort.  We want to make every effort to control your discomfort so please discuss any uncontrolled symptoms with your nurse.   Your doctor will most likely have prescribed pain medications.      If you are going home after surgery you will receive individualized written care instructions before being discharged.  A responsible adult must drive you to and from the hospital on the day of your surgery and stay with you for 24 hours.  Discharge prescriptions can be filled by the hospital pharmacy during regular pharmacy hours.  If you are having surgery late  in the day/evening your prescription may be e-prescribed to your pharmacy.  Please verify your pharmacy hours or chose a 24 hour pharmacy to avoid not having access to your prescription because your pharmacy has closed for the day.        If you have any questions please call Pre-Admission Testing at [phone].  Deductibles and co-payments are collected on the day of service. Please be prepared to pay the required co-pay, deductible or deposit on the day of service as defined by your plan.    Patient Education for Self-Quarantine Process    Following your COVID testing, we strongly recommend that you do not leave your home after you have been tested for COVID except to get medical care. This includes not going to work, school or to public areas.  If this is not possible for you to do please limit your activities to only required outings.  Be sure to wear a mask when you are with other people, practice social distancing and wash your hands frequently.      The following items provide additional details to keep you safe.  • Wash your hands with soap and water frequently for at least 20 seconds.   • Avoid touching your eyes, nose and mouth with unwashed hands.  • Do not share anything - utensils, towels, food from the same bowl.   • Have your own utensils, drinking glass, dishes, towels and bedding.   • Do not have visitors.   • Do use FaceTime to stay in touch with family and friends.  • You should stay in a specific room away from others if possible.   • Stay at least 6 feet away from others in the home if you cannot have a dedicated room to yourself.   • Do not snuggle with your pet. While the CDC says there is no evidence that pets can spread COVID-19 or be infected from humans, it is probably best to avoid “petting, snuggling, being kissed or licked and sharing food (during self-quarantine)”, according to the CDC.   • Sanitize household surfaces daily. Include all high touch areas (door handles, light  switches, phones, countertops, etc.)  • Do not share a bathroom with others, if possible.   • Wear a mask around others in your home if you are unable to stay in a separate room or 6 feet apart. If  you are unable to wear a mask, have your family member wear a mask if they must be within 6 feet of you.   Call your surgeon immediately if you experience any of the following symptoms:  • Sore Throat  • Shortness of Breath or difficulty breathing  • Cough  • Chills  • Body soreness or muscle pain  • Headache  • Fever  • New loss of taste or smell  • Do not arrive for your surgery ill.  Your procedure will need to be rescheduled to another time.  You will need to call your physician before the day of surgery to avoid any unnecessary exposure to hospital staff as well as other patients.

## 2021-03-10 NOTE — PERIOPERATIVE NURSING NOTE
Dr. Christian and Dr. Duran at bedside.  Patient doing much, much better, Dr. Duran visualized dressing and noted drainage.

## 2021-03-10 NOTE — ANESTHESIA PREPROCEDURE EVALUATION
Anesthesia Evaluation     Patient summary reviewed and Nursing notes reviewed   no history of anesthetic complications:  NPO Solid Status: > 8 hours  NPO Liquid Status: > 2 hours           Airway   Mallampati: II  TM distance: >3 FB  Neck ROM: full  No difficulty expected  Dental - normal exam     Pulmonary     breath sounds clear to auscultation  (+) COPD, sleep apnea,     ROS comment: Unilateral phrenic nerve paralysis   Cardiovascular   Exercise tolerance: good (4-7 METS)    ECG reviewed  Rhythm: regular  Rate: normal    (+) hypertension,     ROS comment: Echo reviewed: Normal EF, Stage I DD, mild to moderate MR  LHC: Clean cath in 2016    Neuro/Psych  GI/Hepatic/Renal/Endo    (+)   liver disease,     ROS Comment: Being evaluated by for liver transplant at . MELD 13, bili elevated    Musculoskeletal     Abdominal    Substance History      OB/GYN          Other      history of cancer                    Anesthesia Plan    ASA 4     general   (MAC vs general, will discuss with surgery team)  intravenous induction     Anesthetic plan, all risks, benefits, and alternatives have been provided, discussed and informed consent has been obtained with: patient.

## 2021-03-10 NOTE — OP NOTE
PREOPERATIVE DIAGNOSIS:  · Large right inguinal hernia  · Cirrhosis    POSTOPERATIVE DIAGNOSIS (FINDINGS):  · Large right inguinal hernia with hernia sac extending into the scrotum, contents of hernia sac ascites  · 2 small questionable mid esophageal varices, otherwise normal esophagus and GE junction.  Mild antritis.    PROCEDURE:  1.  Open right inguinal hernia repair with mesh  2.  EGD    SURGEON:  Miguel Duran MD    ASSISTANT:  Nicole Junior, was responsible for performing the following activities: suction, irrigation, suturing, closing, retraction, and placing dressing, and their skilled assistance was necessary for the success of this case.    ANESTHESIA:  Monitored sedation    EBL:  Minimal    SPECIMEN(S):  none    DESCRIPTION:  In supine position under sedation prepped and draped usual sterile manner.  1% lidocaine with epinephrine infiltrated locally.  Transverse incision made and carried to and through the external oblique.  Large hernia sac was identified and carefully  from the cord structures.  The hernia sac was  from the cord structures and contained only ascites.  It was dissected well past the internal ring and held in place with a Ray-Rosita.  The conjoined aponeurosis was identified as was the shelving edge of Poupart's ligament.  The cremasteric vessels were clipped and divided.  With the spermatic cord the skeletonized, a generous medial relaxing incision was made.  The conjoined aponeurosis was approximated to the shelving edge of Poupart's ligament with interrupted 0 Ethibond.  3 x 6 polypropylene mesh was placed on the inguinal floor and secured to the shelving edge of Poupart's ligament with interrupted 0 Ethibond.  Medially and superiorly to the conjoined aponeurosis with 0 Ethibond.  A slit was made for the cord in the inferior aspect of the superior portion of the mesh was secured to the shelving edge of Poupart's ligament lateral to the cord with interrupted 0 Ethibond.   Good hemostasis was noted.  Maggie was applied along the proximal cord and towards the scrotum.  19 Bulgarian Darwin drain was placed well into the scrotum adjacent to the testicle.  Extra oblique was closed with running 3-0 Vicryl.  Ynes's running 3-0 Vicryl.  Skin was closed with interrupted 4-0 nylon vertical mattress sutures.  Sterile dressing applied.    Gastroscope was inserted without difficulty into the esophagus and stomach.  There was some difficulty negotiating to and past the pylorus but ultimately the first and second portions of duodenum were well visualized and grossly normal.  The gastric antrum showed mild and it places moderate antritis but no ulcer or other abnormality.  Antegrade and retrograde view of the body and fundus of the stomach were normal.  The GE junction was normal.  In the midesophagus there were 2 questionable varices, but the remainder the esophagus was normal.    Miguel Duran M.D.

## 2021-03-10 NOTE — ANESTHESIA POSTPROCEDURE EVALUATION
"Patient: Harris Christian    Procedure Summary     Date: 03/10/21 Room / Location: [facility] OR  / [facility] MAIN OR    Anesthesia Start: 1353 Anesthesia Stop: 1551    Procedures:       ESOPHAGOGASTRODUODENOSCOPY (N/A Esophagus)      open right inguinal hernia repair with mesh (Right Abdomen) Diagnosis:       Right inguinal hernia      (Right inguinal hernia [K40.90])    Surgeons: Miguel Duran MD Provider: Alfonso Garcia MD    Anesthesia Type: general ASA Status: 4          Anesthesia Type: general    Vitals  Vitals Value Taken Time   /80 03/10/21 1700   Temp 36.5 °C (97.7 °F) 03/10/21 1700   Pulse 120 03/10/21 1711   Resp 16 03/10/21 1700   SpO2 95 % 03/10/21 1713   Vitals shown include unvalidated device data.        Post Anesthesia Care and Evaluation    Patient location during evaluation: bedside  Patient participation: complete - patient participated  Level of consciousness: awake and alert  Pain score: 0  Pain management: adequate  Airway patency: patent  Anesthetic complications: No anesthetic complications    Cardiovascular status: acceptable  Respiratory status: acceptable  Hydration status: acceptable    Comments: /80 (BP Location: Right arm, Patient Position: Lying)   Pulse 98   Temp 36.5 °C (97.7 °F) (Oral)   Resp 16   Ht 175.3 cm (69\")   Wt 97 kg (213 lb 12.8 oz)   SpO2 97%   BMI 31.57 kg/m²       "

## 2021-03-10 NOTE — PERIOPERATIVE NURSING NOTE
Call placed to Dr. Garcia regarding wife's request for more relaxation medicine, left voicemail, awaiting call return.

## 2021-03-10 NOTE — H&P
SUMMARY (A/P):    75-year-old gentleman with intermittently severely symptomatic large right inguinal hernia.  Recommended open right inguinal hernia repair to hopefully be performed under monitored sedation.  He understands nature the procedure and the risks including but not limited to bleeding, infection, ascites leak, possible use of mesh, and recurrence.      CC:    Right inguinal hernia    HPI:    75-year-old gentleman with intermittently severe symptoms from large right inguinal hernia.    PHYSICAL EXAM:   • Constitutional: Well-developed well-nourished, no acute distress  • Genitourinary: Large reducible right inguinal hernia, no definite evidence of left inguinal hernia  • Psychiatric: Alert and oriented ×3, normal affect     ALLERGIES:   • None    MEDICATIONS:   • Lasix  • Aldactone  • Cyanocobalamin    PMH:    • Hepatocellular carcinoma (treated with radiation therapy)  • Paralyzed hemidiaphragm  • Cirrhosis of liver with ascites  • Sleep apnea    PSH:    • No previous abdominal surgery    BOOM CASTILLO M.D.

## 2021-03-10 NOTE — DISCHARGE INSTRUCTIONS
Dr. Miguel Duran  [address]  [place], [place] [address] [phone]    Discharge Instructions for Hernia Surgery    1. Go home, rest and take it easy today; however, you should get up and move about several times today to reduce the risk of developing a clot in your legs.      2. You may experience some dizziness or memory loss from the anesthesia.  This may last for the next 24 hours.  Someone should plan on staying with you for the first 24 hours for your safety.    3. Do not make any important legal decisions or sign any legal papers for the next 24 hours.      4. Eat and drink lightly today.  Start off with liquids, jello, soup, crackers or other bland foods at first. You may advance your diet tomorrow as tolerated as long as you do not experience any nausea or vomiting.     5. If skin glue (Dermabond) was used, your incisions are protected and covered.  The invisible glue will dissolve on its own as your incision heals. If dressings were used, you may remove your outer dressings in 3 days.  The white tapes called steri-strips should stay in place.  They will fall off on their own in 1-2 weeks.  Do not worry if they come off sooner.      6. If dressings were used, you may notice some bleeding/drainage on your outer dressings. A little bloody drainage is normal. If the bleeding/drainage is such that the bandage cannot absorb it, remove the dressing, apply clean gauze and apply firm pressure for a full 15 minutes.  If the bleeding continues, please call me.    7. You may shower tomorrow allowing water to run over the incisions; however, do not scrub the incisions.  No tub baths until your incisions are completely healed.      8. No lifting > 20 lbs. until you are seen at your follow-up visit.         9. You have received a prescription for a narcotic pain medicine, as you will have some pain following surgery.   You will not be totally pain free, but your pain medicine should make the pain  tolerable.  Please take your pain medicine as prescribed and always take your pills with food to prevent nausea. If you are having severe pain that cannot be controlled by the pain medicine, please contact me.      10. You have also received a prescription for an anti-nausea medicine.  Please take this as prescribed for any nausea or vomiting.  Nausea could be a result of the anesthesia or a result of the narcotic pain medicine.  If you experience severe nausea and vomiting that cannot be controlled by the nausea medicine, please call me.      11. If you had a laparoscopic surgery, it is not unusual to experience pain/discomfort in your shoulders or under your ribs after surgery.  It is from the gas used during the laparoscopic procedure and usually lasts 1-3 days.  The prescription pain medicine is used to treat the surgical pain and does not typically alleviate this “gassy” pain.     12. No driving for 24 hours and for as long as you are taking your prescription pain medicine.    13. You will need to call the office at [phone] to schedule a follow-up appointment in 6-10 days.     14. Remember to contact me for any of the following:    • Fever > 101 degrees  • Severe pain that cannot be controlled by taking your pain pills  • Severe nausea or vomiting that cannot be controlled by taking your nausea pills  • Significant bleeding of your incisions  • Drainage that has a bad smell or is yellow or green in appearance  • Any other questions or concerns      Additional Instruction for Inguinal Hernia Patients Only    1. If you did not urinate at the hospital after your surgery or if you feel the need to urinate and cannot, this will necessitate a return to the Emergency Room for placement of a urinary catheter.  You should also notify me as well.  As a rule, you should be able to empty your bladder within 4-6 hours after discharge from the hospital.      2. You may notice some scrotal bruising and/or swelling. A scrotal  support or briefs as well as ice packs may be used to alleviate discomfort.

## 2021-03-15 NOTE — TELEPHONE ENCOUNTER
Caller: NINA RAGSDALE    Relationship to patient: WIFE    Best call back number: [phone]    CALLING TO CANCEL PT'S LAB AND FOLLOW UP APPT ON 03/23. STATES DR WITT HAS AGREED TO CANCEL THE APPT

## 2021-06-09 PROBLEM — K74.60 CIRRHOSIS (HCC): Status: ACTIVE | Noted: 2021-01-01

## 2021-06-09 NOTE — CONSULTS
Referring Provider: Dr. Miguel Duran  Reason for Consultation: hypoNa    Subjective     Chief complaint No chief complaint on file.      History of present illness:  74 yo WM with normal renal function at baseline admitted today for further evaluation of worsening hyperbilirubinemia, hyponatremia, and malaise.  Outpatient labs yesterday revealed serum sodium 121, potassium 5.5, BUN 46, and creatinine 1.3.  PMH outlined below; pertinent is cirrhosis (liver biopsy in 2016) attributed to autoimmune hepatitis vs PBC; HCC (July '20) treated with stereotactic radiation; elevated left hemidiaphragm; nocturnal hypoxia and hypoventilation on ?Trilogy device at home; and ongoing evaluation at  for liver transplant.    · He has lost 25 pounds of weight since January this year  · 3 paracenteses over the last 6 weeks or so; last one--4 L removed--performed 9 days ago; patient indicates that he has felt wiped out since then  · Has dyspnea with exertion; chronic orthopnea; no chest pain  · Waxing and waning lower extremity swelling, with temporary improvement following paracentesis  · Diuretic regimen includes 40 mg Lasix and 50 mg spironolactone each morning; he does report surge in UOP for a few hours afterwards  · Compliant with low-salt (1.5 g daily) diet, tho on relatively generous fluid restriction (only 2 L/day)  · Marked decline in appetite, especially bad over the last few days  · Balance has worsened over the last few weeks; wife must assist him with walking now; does describe orthostatic symptoms    Past Medical History:   Diagnosis Date   • CAD (coronary artery disease)    • Cirrhosis of liver without ascites (CMS/HCC)    • Cobalamin deficiency 6/15/2016   • COPD (chronic obstructive pulmonary disease) (CMS/HCC)     paralyzed left diaphragm   • Elevated hemidiaphragm 06/15/2016   • Hepatocellular carcinoma (CMS/HCC) 7/21/2020   • History of radiation therapy     SUMMER OF 2020 FOR LIVER CANCER.    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;  Service:    • CARDIAC CATHETERIZATION N/A 6/3/2016    Procedure: Left ventriculography;  Surgeon: Александр Man MD;  Location:  [facility] CATH INVASIVE LOCATION;  Service:    • ENDOSCOPY N/A 3/10/2021    Procedure: ESOPHAGOGASTRODUODENOSCOPY;  Surgeon: Miguel Duran MD;  Location: [facility] MAIN OR;  Service: General;  Laterality: N/A;   • INGUINAL HERNIA REPAIR Right 3/10/2021    Procedure: open right inguinal hernia repair with mesh;  Surgeon: Miguel Duran MD;  Location: [facility] MAIN OR;  Service: General;  Laterality: Right;   • NOSE SURGERY       Family History   Problem Relation Age of Onset   • Heart attack Father    • Cancer Father    • Alcohol abuse Father    • Dementia Father    • Colon cancer Neg Hx    • Colon polyps Neg Hx    • Malig Hyperthermia Neg Hx      Social History     Tobacco Use   • Smoking status: Never Smoker   • Smokeless tobacco: Never Used   Vaping Use   • Vaping Use: Never used   Substance Use Topics   • Alcohol use: Yes     Comment: rare/  Daily caffeine use   • Drug use: No     Medications Prior to Admission   Medication Sig Dispense Refill Last Dose   • Cyanocobalamin 1000 MCG/ML kit Inject 1,000 mcg as directed every 3 (three) months.      • furosemide (LASIX) 20 MG tablet Take 20-40 mg by mouth Daily. WILL TAKE TWO DEPENDANT IF THERE IS SWELLING      • guaiFENesin-codeine (GUAIFENESIN AC) 100-10 MG/5ML liquid Take 5 mL by mouth 3 (Three) Times a Day As Needed  for Cough. 473 mL 0    • midodrine (PROAMATINE) 2.5 MG tablet Take 1 tablet by mouth 3 (Three) Times a Day Before Meals for 30 days. 3 tablet 0    • spironolactone (ALDACTONE) 25 MG tablet Take 50 mg by mouth Daily.      • ursodiol (ACTIGALL) 500 MG tablet Take 500 mg by mouth Daily.        Allergies:  Patient has no known allergies.    Review of Systems  14-point ROS performed and all negative except for pertinent +/-'s detailed in HPI.     Objective     Vital Signs            No intake/output data recorded.  No intake/output data recorded.  No intake or output data in the 24 hours ending 06/09/21 1032    Physical Exam:  NAD; pleasant; oriented but slightly blunted; looks older than stated age  Chronically ill-appearing; jaundiced  MMM; +scleral icterus  No JVD at 45 degrees; temporal wasting present; atraumatic head   No hepatojugular reflex  No eye discharge; no carotid bruits  Breath sounds lower third on left; crackles right base; not labored on RA  RRR, no rub  Soft, NT, +D, + ascites BS+  +2 edema  Libido both lower legs; acrocyanosis; extremities cool  No clubbing  Subtle asterixis  Moves all extremities     Results Review:  Results from last 7 days   Lab Units 06/08/21  1107   SODIUM mmol/L 121*   POTASSIUM mmol/L 5.5*   CHLORIDE mmol/L 85*   CO2 mmol/L 29.1*   BUN mg/dL 46*   CREATININE mg/dL 1.26   CALCIUM mg/dL 9.7   BILIRUBIN mg/dL 9.8*   ALK PHOS U/L 323*   ALT (SGPT) U/L 149*   AST (SGOT) U/L 186*   GLUCOSE mg/dL 116*       Estimated Creatinine Clearance: 53.9 mL/min (by C-G formula based on SCr of 1.26 mg/dL).                Results from last 7 days   Lab Units 06/08/21  1103   INR  1.53*       Active Medications    No current facility-administered medications for this encounter.      Assessment/Plan   Assessment  1.  Hyponatremia with hypervolemia (lower extremity edema; ascites), though in face of cirrhosis, progressive liver disease, poor solute intake, hypotension, and pulmonary disease.  Normal  TSH yesterday.  2.  Prerenal azotemia, likely due to hypotension and cirrhosis.  Has mild hyperkalemia on spironolactone  3.  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  HCC s/p stereotactic radiation roughly 1 year ago  5.  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  Chronic nocturnal hypoxia and hypoventilation on ?Trilogy device at home  7.  Hypotension        * No active hospital problems. *      Plan  1.  Discontinue spironolactone for now in face of hyperkalemia  2.  FENa and Uosm  3.  Orthostatics; low threshold to give small volume of saline if he is orthostatic  4.  Double midodrine dose to 5 mg TID  5.  One dose of IV albumin following paracentesis    I discussed the patient's findings and my recommendations with patient's wife at bedside and with Dr. Marcel Martinez MD  06/09/21  10:32 EDT

## 2021-06-09 NOTE — CONSULTS
Referring Provider: Miguel Duran MD  [address]  [facility] [address]  [place] [address]    Reason for Consultation: infectious workup prior to liver transplant    History of present illness:  Harris Christian is a 75 y.o. with decompensated cirrhosis likely due to autoimmune hepatitis and history of  HCC s/p radiation who I am asked to evaluate and give opinion for infectious workup in the context of worsening ascites, hyponatremia, and hyperbilirubinemia with hopes of being approved for liver transplantation in the near future. History is obtained from the patient, his wife, and review of the old/outside medical records which I summarize/synthesize as follows: Around February 2020 he started having shortness of breath and was diagnosed with cirrhosis. About 3 months later he had imaging that showed a liver mass consistent with HCC and he underwent treatment with stereotactic radiation at [facility]. In March 2021 he establish with the [facility] and has been undergoing pre-transplant evaluation through their clinic. His last visit there was on 5/21/21.     Over the past 6 week he's had worsening shortness of breath, LE edema and ascites for which he has required 3 paracentesses. He really hasn't been having much in the way of infectious symptoms such as fevers, chills, sweats, dysuria, skin lesions, rashes, headache, neck stiffness, diarrhea, vomiting, or cough. He's had some skin tears on the legs due to swelling but no heat, erythema, or purulent drainage.     He had labs done yesterday that showed a Na of 121 (previously baseline had been ~134-137) and  Tbili of 9 (previously in the 3-4 range). His MELD calculated to 29 and [facility] transplant clinic was contacted. They were concerned that these lab changes were due to infection so he has been admitted to the hospital for further workup.     He denies a past history of serious or unusual infection. No history of sepsis. No history of fungal infections.  He has a pet dog but no other animal exposures. No recent bites or scratches. No recent travel. 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 No painful swallowing. His appetite has been depressed. Due to the recurrent ascites, he has really been cutting back on his sodium intake in the past few weeks.     PMH:  HTN  B12 deficiency  Elevated hemidiaphragm  Phrenic nerve palsy  Cirrhosis  HCC    Past Surgical History:   Procedure Laterality Date   • CARDIAC CATHETERIZATION N/A 6/3/2016    Procedure: Coronary angiography;  Surgeon: Александр Man MD;  Location: [facility] CATH INVASIVE LOCATION;  Service:    • CARDIAC CATHETERIZATION N/A 6/3/2016    Procedure: Right and Left Heart Cath;  Surgeon: Александр Man MD;  Location: [facility] CATH INVASIVE LOCATION;  Service:    • CARDIAC CATHETERIZATION N/A 6/3/2016    Procedure: Left ventriculography;  Surgeon: Александр Man MD;  Location: [facility] INVASIVE LOCATION;  Service:    • ENDOSCOPY N/A 3/10/2021    Procedure: ESOPHAGOGASTRODUODENOSCOPY;  Surgeon: Miguel Duran MD;  Location: [facility] OR;  Service: General;  Laterality: N/A;   • INGUINAL HERNIA REPAIR Right 3/10/2021    Procedure: open right inguinal hernia repair with mesh;  Surgeon: Miguel Duran MD;  Location: [facility] MAIN OR;  Service: General;  Laterality: Right;   • NOSE SURGERY         Social History:      Non-smoker    Family History:  Dad: CAD    Antibiotic allergies and intolerances:  None    Medications:    Current Facility-Administered Medications:   •  guaiFENesin-codeine (GUAIFENESIN AC) 100-10 MG/5ML liquid 5 mL, 5 mL, Oral, TID PRN, Miguel Duran MD  •  midodrine (PROAMATINE) tablet 2.5 mg, 2.5 mg, Oral, TID AC, Miguel Duran MD  •  sodium chloride 0.9 % flush 10 mL, 10 mL, Intravenous, Q12H, Miguel Duran MD  •   sodium chloride 0.9 % flush 10 mL, 10 mL, Intravenous, PRN, Miguel Duran MD  •  [START ON 6/10/2021] ursodiol (ACTIGALL) tablet 500 mg, 500 mg, Oral, Daily, Miguel Duran MD    Review of Systems  All systems were reviewed and are negative unless otherwise stated above in the HPI; pertinent positives are abdominal swelling, shortness of breath, and fatigue    Objective   Vital Signs   VS pending    Physical Exam:   General: awake, very nice  Head: atraumatic  Eyes: + jaundice, wears glasses  ENT: MMM  Neck: Supple  Cardiovascular: NR, RR, no murmurs, + pitting BLE edema  Respiratory: Lungs are clear to auscultation bilaterally, no rales or wheezing; normal work of breathing on ambient air  GI: Abdomen is distended, non-tender, distant but audible bowel sounds  :  no Spears catheter  Musculoskeletal: no joint effusions, normal musculature  Skin: skin tears on arms and legs but no heat, streaking erythema, or purulent drainage  Neurological: Alert and oriented x 3, motor strength symmetric  Psychiatric: Normal mood and affect     Labs:     Lab Results   Component Value Date    WBC 5.64 05/28/2021    HGB 14.1 05/28/2021    HCT 40.1 05/28/2021    MCV 99.5 (H) 05/28/2021     05/28/2021       Lab Results   Component Value Date    GLUCOSE 116 (H) 06/08/2021    BUN 46 (H) 06/08/2021    CREATININE 1.26 06/08/2021    EGFRIFNONA 56 (L) 06/08/2021    EGFRIFAFRI 100 06/11/2020    BCR 36.5 (H) 06/08/2021    CO2 29.1 (H) 06/08/2021    CALCIUM 9.7 06/08/2021    PROTENTOTREF 6.9 06/11/2020    ALBUMIN 2.40 (L) 06/08/2021    LABIL2 1.0 06/11/2020     (H) 06/08/2021     (H) 06/08/2021     Lab Results   Component Value Date    INR 1.53 (H) 06/08/2021    INR 1.0 05/28/2021    INR 1.33 (H) 05/21/2021    PROTIME 18.2 (H) 06/08/2021    PROTIME 12.3 (L) 05/28/2021    PROTIME 16.3 (H) 05/21/2021       ------------------------------------------------     Labs 1/29/21:  Hep A IgG positive  Hep B sAg negative  Hep  B sAb negative  Hep C Ab negative    Viral Hepatitis Panel (3/2/20):  Hep A IgM negative  Hep B sAg negative  Hep B c IgM negative  Hep C Ab negative    5/6 Paracentesis:   Negative for malignant cells    [facility] 5/3/21:  HIV Ab negative  Syphilis Ab negative  Crypto Ag negative  CMV IgG > 10  EBV IgG positive >750  VZV IgG positive  HSV 1 Ab positive  HSV 2 Ab negative    Microbiology:  4/2 Wound Cx: scant growth Corynebacterium species    Radiology (personally reviewed reports):  1/18/21 MRI Abd: There has been slight interval decrease in size of the 3.1 x 2.6 cm  medial hepatic segment mass. No definite new lesions are seen.Development of a moderately large volume of ascites. Some of thevarices appear more prominent likely secondary to progression of portal hypertension.    5/25/21: 4300 cc paracentesis    Assessment/Plan   1. Autoimmune hepatitis and decompensated cirrhosis  2. Hepatocellular carcinoma - treated  3. Hyponatremia  4. Hyperbilirubinemia  5. Acute kidney injury  6. Pre-transplant evaluation    While I think it's most likely that his worsening labs are due to progressive liver disease and poor PO intake (worsenign hyponatremia specifically), I agree that we should start an infectious workup with blood cultures, paracentesis sent for cell count and cultures, urinalysis, and Crypto Ag. If no clear answers there and concern for infection persists, I would obtain a RUQ US or CT A/P.     He's had much of his pre-liver transplant infectious workup done at  (ie CMV, EBV, etc--see above under labs). He is not immune to Hepatitis B so I will start that vaccine series today. I will also check a Tspot.     Trend temperature curve and target further workup to any new symptoms.     ID will follow. D/W Dr Good and DANIEL Martinez.

## 2021-06-09 NOTE — CONSULTS
[facility]  Initial Inpatient Consult Note    Referring Provider: Dr Duran    Reason for Consultation: Cirrhosis    Subjective     History of present illness:      Thank you for requesting my opinion.    This is a 75-year-old man with a diagnosis of cirrhosis, thought to be autoimmune hepatitis versus primary biliary cholangitis.  He was diagnosed with hepatocellular carcinoma in the summer 2020 and was treated with stereotactic radiation at [facility].  He has also been seen by the [facility] transplant center and is in the process of work-up for liver transplantation.  He will be seen there again later this month    He was admitted for progression of his disease.  He had labs done yesterday demonstrating worsening bilirubin, worsening sodium level, worsening renal function.  He has had worsening ascites.  He has been on diuretics.  He has had hypotension.  Diuretics were held yesterday and he has been taking midodrine.    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  No evidence of SBP    Work-up for causes of his cirrhosis have demonstrated an elevated mitochondrial antibody.  He tried ursodiol for 1 day but did not tolerate it very well.  He has not been on it since.    MELD-NA 29    EGD March this year with no varices    Past Medical History:  Past Medical History:   Diagnosis Date   • CAD (coronary artery disease)    • Cirrhosis of liver without ascites (CMS/HCC)    • Cobalamin deficiency 6/15/2016   • COPD (chronic obstructive pulmonary disease) (CMS/HCC)     paralyzed left diaphragm   • Elevated hemidiaphragm 06/15/2016   • Hepatocellular carcinoma (CMS/HCC) 7/21/2020   • History of radiation therapy     SUMMER OF 2020 FOR LIVER CANCER.    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   • Right inguinal hernia    • Sleep apnea     USES CPAP   • Vitamin B 12 deficiency        Past Surgical History:  Past Surgical History:   Procedure Laterality Date   • CARDIAC CATHETERIZATION N/A 6/3/2016    Procedure: Coronary angiography;  Surgeon: Александр Man MD;  [facility];  Service:    • CARDIAC CATHETERIZATION N/A 6/3/2016    Procedure: Right and Left Heart Cath;  Surgeon: Александр Man MD;  [facility];  Service:    • CARDIAC CATHETERIZATION N/A 6/3/2016    Procedure: Left ventriculography;  Surgeon: Александр Man MD;  Location:  [facility] CATH INVASIVE LOCATION;  Service:    • ENDOSCOPY N/A 3/10/2021    Procedure: ESOPHAGOGASTRODUODENOSCOPY;  Surgeon: Miguel Duran MD;  Location: [facility] MAIN OR;  Service: General;  Laterality: N/A;   • INGUINAL HERNIA REPAIR Right 3/10/2021    Procedure: open right inguinal hernia repair with mesh;  Surgeon: Miguel Duran MD;  Location: [facility] OR;  Service: General;  Laterality: Right;   • NOSE SURGERY          Social History:   Social History     Tobacco Use   • Smoking status: Never Smoker   • Smokeless tobacco: Never Used   Substance Use Topics   • Alcohol use: Yes     Comment: rare/  Daily caffeine use        Family History:  Family History   Problem Relation Age of Onset   • Heart attack Father    • Cancer Father    • Alcohol abuse Father    • Dementia Father    • Colon cancer Neg Hx    • Colon polyps Neg Hx    • Malig Hyperthermia Neg Hx        Home Meds:  Medications Prior to Admission   Medication Sig Dispense Refill Last Dose   • furosemide (LASIX) 20 MG tablet Take 20-40 mg by mouth Daily. WILL TAKE TWO DEPENDANT IF THERE IS SWELLING   6/9/2021 at Unknown time   • midodrine (PROAMATINE) 2.5 MG tablet Take 1 tablet by mouth 3 (Three) Times a Day Before Meals for 30 days. 3 tablet 0 6/9/2021 at Unknown time   • spironolactone (ALDACTONE) 25 MG  tablet Take 50 mg by mouth Daily.   6/9/2021 at Unknown time   • ursodiol (ACTIGALL) 500 MG tablet Take 500 mg by mouth Daily.   6/9/2021 at Unknown time   • Cyanocobalamin 1000 MCG/ML kit Inject 1,000 mcg as directed every 3 (three) months.      • guaiFENesin-codeine (GUAIFENESIN AC) 100-10 MG/5ML liquid Take 5 mL by mouth 3 (Three) Times a Day As Needed for Cough. 473 mL 0        Current Meds:   midodrine, 2.5 mg, Oral, TID AC  sodium chloride, 10 mL, Intravenous, Q12H  spironolactone, 50 mg, Oral, Daily  ursodiol, 500 mg, Oral, Daily        Allergies:  No Known Allergies    Review of Systems  All systems were reviewed and negative except for:  Constitution:  positive for fatigue  Cardiovascular: positive for  lower extremity edema  Gastrointestinal: positive for  abdominal distension     Objective     Vital Signs       Physical Exam:  Constitutional:   Alert, cooperative, in no acute distress, appears stated age   Eyes:           Lids and lashes normal, conjunctivae normal, scleral icterus   Ears, nose, mouth and throat:  Normal appearance of external ears and nose, no oral l  lesions, no thrush, oral mucosa moist   Respiratory:    Clear to auscultation, respirations regular, even and             unlabored    Cardiovascular:   Regular rhythm and normal rate, normal S1 and S2, no        murmur, no gallop, palpable distal pulses, 3+ LE edema bilat   Gastrointestinal:    Soft, mildly distended, nontender to palpation, no guarding, no rebound tenderness, normal bowel sounds, no palpable masses or organomegaly  Rectal exam: deferred   Musculoskeletal:  Normal station, no atrophy, no tenderness to palpation, normal digits and nails   Skin:  Jaundiced, no bleeding, bruising, rashes or lesions   Lymphatics:  No palpable cervical or supraclavicular adenopathy   Psychiatric:  Judgement and insight: normal   Orientation to person, place and time: normal   Mood and affect: normal       Results Review:   I reviewed the  patient's new clinical results.          Results from last 7 days   Lab Units 06/08/21  1107   SODIUM mmol/L 121*   POTASSIUM mmol/L 5.5*   CHLORIDE mmol/L 85*   CO2 mmol/L 29.1*   BUN mg/dL 46*   CREATININE mg/dL 1.26   CALCIUM mg/dL 9.7   BILIRUBIN mg/dL 9.8*   ALK PHOS U/L 323*   ALT (SGPT) U/L 149*   AST (SGOT) U/L 186*   GLUCOSE mg/dL 116*       Results from last 7 days   Lab Units 06/08/21  1103   INR  1.53*       No results found for: LIPASE    Radiology:  Imaging Results (Last 72 Hours)     ** No results found for the last 72 hours. **          Assessment/Plan       Cirrhosis (CMS/HCC)      Impression  1. Decompensated cirrhosis: suspected autoimmune/PBC.  Has had an acute on chronic decline.  Current MELD Na of 29    2. Acute on chronic liver failure    3. Ascites, lower extremity edema    4. Hyponatremia    5. ANNA MARIE    6. HCC: s/p radiation 1 year ago; 5/13/2021 MRI with subtle nodular rim enhancement, noted to be equivocal findings    Plan  Paracentesis with complete fluid studies  Portal duplex ultrasound to rule out thrombus  Defer to nephrology regarding hyponatremia, ANNA MARIE; suspected to be intravascular depleted  3 meals, 3 snacks daily as well as bedtime snack  Consideration of IV acetylcysteine 150mg/kg daily for acute on chronic liver failure but will give him the next 24 hours to see how he does before starting this  Close follow-up with [facility]  Consider evaluation by local transplant hepatologists      I discussed the patients findings and my recommendations with patient and family    All necessary PPE, including face mask and eye protection, were worn during this encounter.  Hand sanitization was performed both before and after the patient interaction.    Veronica Good MD  [facility]      Dictated utilizing Dragon dictation

## 2021-06-10 NOTE — PAYOR COMM NOTE
"INITIAL CLINICAL FOR REVIEW    REF #[id number]    F: [phone]        Ede Ragsdale (75 y.o. Male)     Date of Birth Social Security Number Address Home Phone MRN    1945  [id number]  [facility] [address] [phone] [id number]    Oriental orthodox Marital Status          Uatsdin        Admission Date Admission Type Admitting Provider Attending Provider Department, Room/Bed    6/9/21 Urgent Miguel Duran MD Pokorny, Richard, MD [address], P382/1    Discharge Date Discharge Disposition Discharge Destination                       Attending Provider: Miguel Duran MD    Allergies: No Known Allergies    Isolation: None   Infection: None   Code Status: Not on file    Ht: 170.2 cm (67.01\")   Wt: 86.2 kg (190 lb 0.6 oz)    Admission Cmt: None   Principal Problem: None                Active Insurance as of 6/9/2021     Primary Coverage     Payor Plan Insurance Group Employer/Plan Group    [employer] [facility] [employer] EMPLOYEE [id number]     Payor Plan Address Payor Plan Phone Number Payor Plan Fax Number Effective Dates    [address] [phone]  1/1/2018 - None Entered    [facility] [id number]       Subscriber Name Subscriber Birth Date Member ID       GINO,JAN 1/8/1956 [id number]           Secondary Coverage     Payor Plan Insurance Group Employer/Plan Group    MEDICARE MEDICARE A ONLY      Payor Plan Address Payor Plan Phone Number Payor Plan Fax Number Effective Dates    [address] [phone]  10/1/2011 - None Entered    [facility] [id number]       Subscriber Name Subscriber Birth Date Member ID       EDE RAGSDALE 1945 [id number]                 Emergency Contacts      (Rel.) Home Phone Work Phone Mobile Phone    Gino,Jan (Spouse) [phone] -- [phone]            Vital Signs (last day)     Date/Time   Temp   Temp src   Pulse   Resp   BP   Patient Position   SpO2    06/10/21 0706   --   --   --   --   (!) " 77/47   Standing   --    06/10/21 0703   --   --   --   --   (!) 82/62   Standing   --    06/10/21 0700   96.6 (35.9)   Oral   81   16   (!) 86/60   Lying   97    06/10/21 0451   97.4 (36.3)   Oral   77   18   98/69   Lying   96    06/09/21 2300   97.6 (36.4)   Oral   61   18   (!) 82/53   Lying   96    06/09/21 2135   --   --   --   --   --   --   98    06/09/21 2038   --   --   --   --   90/68   Lying   --    06/09/21 2026   97 (36.1)   Oral   78   18   (!) 86/64   Lying   --    06/09/21 1539   97.6 (36.4)   Oral   --   --   (!) 78/61   Standing   --    06/09/21 1538   --   --   --   --   103/77   Sitting   --    06/09/21 1536   --   --   85   16   113/89   Lying   --    06/09/21 1226   --   --   88   16   102/79   Lying   95              Oxygen Therapy (last day)     Date/Time   SpO2   Device (Oxygen Therapy)   Flow (L/min)   Oxygen Concentration (%)   ETCO2 (mmHg)    06/10/21 0700   97   room air   --   --   --    06/10/21 0459   --   room air   --   --   --    06/10/21 0451   96   room air   --   --   --    06/09/21 2300   96   --   --   --   --    06/09/21 2135   98   other (see comments)   2   --   --    06/09/21 2039   --   room air   --   --   --    06/09/21 1226   95   --   --   --   --              Lines, Drains & Airways    Active LDAs     Name:   Placement date:   Placement time:   Site:   Days:    Peripheral IV 06/09/21 1527 Posterior;Right Forearm   06/09/21    1527    Forearm   less than 1    Closed/Suction Drain Right Groin Bulb 19 Fr.   03/10/21    1524    Groin   91                  Medication Administration Report for Harris Christian as of 06/10/21 1148    Legend:    Given Hold Not Given Due Canceled Entry Other Actions    Time Time (Time) Time  Time-Action       Discontinued     Completed     Future     MAR Hold     Linked           Medications 06/09/21 06/10/21    midodrine (PROAMATINE) tablet 5 mg  Dose: 5 mg  Freq: 3 Times Daily Before Meals Route: PO  Start: 06/09/21 1730    (5983) [C]             (0806) [C]     1118     1730             sodium chloride 0.9 % flush 10 mL  Dose: 10 mL  Freq: Every 12 Hours Scheduled Route: IV  Start: 21 1200    1625     2130           0836     2100              ursodiol (ACTIGALL) tablet 500 mg  Dose: 500 mg  Freq: 2 Times Daily Route: PO  Start: 06/10/21 0900     (0836)     2100             Completed Medications  Medications 06/09/21 06/10/21       albumin human 25 % IV SOLN 12.5 g  Dose: 12.5 g  Freq: Once Route: IV  Indications of Use: PARACENTESIS  Start: 21 1315   End: 21 1625    Admin Instructions:   pls give after para     1625                hepatitis B vaccine (recombinant) (ENGERIX-B) injection 20 mcg  Dose: 1 mL  Freq: Once Route: IM  Start: 21 1800   End: 21 1735    Admin Instructions:   If patient is , administer within first 24 hours after birth.       1735                lidocaine (XYLOCAINE) 1 % injection 10 mL  Dose: 10 mL  Freq: Once Route: SC  Start: 21 1330   End: 21 1249    1249 [C]                sodium chloride 0.9 % bolus 1,000 mL  Dose: 1,000 mL  Freq: Once Route: IV  Start: 21 1730   End: 21 2119    1719               Discontinued Medications  Medications 06/09/21 06/10/21       midodrine (PROAMATINE) tablet 2.5 mg  Dose: 2.5 mg  Freq: 3 Times Daily Before Meals Route: PO  Start: 21 1730   End: 21 1448         spironolactone (ALDACTONE) tablet 50 mg  Dose: 50 mg  Freq: Daily Route: PO  Start: 06/10/21 0900   End: 21 1138         ursodiol (ACTIGALL) tablet 500 mg  Dose: 500 mg  Freq: Daily Route: PO  Start: 06/10/21 0900   End: 06/10/21 0747              ,   Medication Administration Report for Harris Christian as of 06/10/21 1148    Legend:    Given Hold Not Given Due Canceled Entry Other Actions    Time Time (Time) Time  Time-Action       Discontinued     Completed     Future     MAR Hold     Linked           Medications 06/09/21 06/10/21   Discontinued Medications     sodium chloride 0.9 % infusion  Rate: 100 mL/hr Dose: 100 mL/hr  Freq: Continuous Route: IV  Start: 06/09/21 1730   End: 06/10/21 0729    Admin Instructions:   Please begin after the saline bolus (one liter over 4 hours) has been given     1955 [C]     2130                     and   Medication Administration Report for Harris Christian as of 06/10/21 1148    Legend:    Given Hold Not Given Due Canceled Entry Other Actions    Time Time (Time) Time  Time-Action       Discontinued     Completed     Future     MAR Hold     Linked           Medications 06/09/21 06/10/21    guaiFENesin-codeine (GUAIFENESIN AC) 100-10 MG/5ML liquid 5 mL  Dose: 5 mL  Freq: 3 Times Daily PRN Route: PO  PRN Reason: Cough  Start: 06/09/21 1102    Admin Instructions:   Caution: Look alike/sound alike drug alert          sodium chloride 0.9 % flush 10 mL  Dose: 10 mL  Freq: As Needed Route: IV  PRN Reason: Line Care  Start: 06/09/21 1102                  Lab Results (last 24 hours)     Procedure Component Value Units Date/Time    Bilirubin, Body Fluid - Body Fluid, Peritoneum [[id number]] Collected: 06/09/21 1250    Specimen: Body Fluid from Peritoneum Updated: 06/10/21 1123     Reference Lab Report See Attached Report    Narrative:      .See original report from the [facility] scanned into patient chart.      Comprehensive Metabolic Panel [[id number]]  (Abnormal) Collected: 06/10/21 0947    Specimen: Blood Updated: 06/10/21 1036     Glucose 107 mg/dL      BUN 43 mg/dL      Creatinine 1.16 mg/dL      Sodium 123 mmol/L      Potassium 5.4 mmol/L      Chloride 89 mmol/L      CO2 26.3 mmol/L      Calcium 9.6 mg/dL      Total Protein 5.9 g/dL      Albumin 2.40 g/dL      ALT (SGPT) 165 U/L      AST (SGOT) 229 U/L      Alkaline Phosphatase 332 U/L      Total Bilirubin 10.5 mg/dL      eGFR Non African Amer 62 mL/min/1.73      Globulin 3.5 gm/dL      A/G Ratio 0.7 g/dL      BUN/Creatinine Ratio 37.1     Anion Gap 7.7 mmol/L      Narrative:      GFR Normal >60  Chronic Kidney Disease <60  Kidney Failure <15      CBC & Differential [[id number]]  (Abnormal) Collected: 06/10/21 0947    Specimen: Blood Updated: 06/10/21 1027    Narrative:      The following orders were created for panel order CBC & Differential.  Procedure                               Abnormality         Status                     ---------                               -----------         ------                     CBC Auto Differential[[id number]]        Abnormal            Final result                 Please view results for these tests on the individual orders.    CBC Auto Differential [[id number]]  (Abnormal) Collected: 06/10/21 0947    Specimen: Blood Updated: 06/10/21 1027     WBC 5.85 10*3/mm3      RBC 3.84 10*6/mm3      Hemoglobin 13.8 g/dL      Hematocrit 37.0 %      MCV 96.6 fL      MCH 35.9 pg      MCHC 37.0 g/dL      RDW 15.2 %      RDW-SD 53.4 fl      MPV 10.0 fL      Platelets 121 10*3/mm3      Neutrophil % 75.2 %      Lymphocyte % 11.8 %      Monocyte % 11.1 %      Eosinophil % 0.5 %      Basophil % 0.2 %      Neutrophils, Absolute 4.40 10*3/mm3      Lymphocytes, Absolute 0.69 10*3/mm3      Monocytes, Absolute 0.65 10*3/mm3      Eosinophils, Absolute 0.03 10*3/mm3      Basophils, Absolute 0.01 10*3/mm3     Phosphorus [[id number]]  (Abnormal) Collected: 06/10/21 0947    Specimen: Blood Updated: 06/10/21 1021     Phosphorus 2.3 mg/dL     Magnesium [[id number]]  (Abnormal) Collected: 06/10/21 0947    Specimen: Blood Updated: 06/10/21 1021     Magnesium 2.6 mg/dL     Anaerobic Culture - Body Fluid, Peritoneum [[id number]] Collected: 06/09/21 1250    Specimen: Body Fluid from Peritoneum Updated: 06/10/21 1010    Sodium, Urine, Random - [[id number]] Collected: 06/09/21 1548    Specimen: Urine Updated: 06/10/21 0831     Sodium, Urine <20 mmol/L     Narrative:      Reference intervals for random urine have not been established.  Clinical usage is dependent upon physician's  interpretation in combination with other laboratory tests.       Creatinine, Urine, Random - [[id number]] Collected: 06/09/21 1548    Specimen: Urine Updated: 06/10/21 0820     Creatinine, Urine 140.0 mg/dL     Narrative:      Reference intervals for random urine have not been established.  Clinical usage is dependent upon physician's interpretation in combination with other laboratory tests.       Body Fluid Culture - Body Fluid, Peritoneum [[id number]] Collected: 06/09/21 1250    Specimen: Body Fluid from Peritoneum Updated: 06/10/21 0702     Body Fluid Culture No growth     Gram Stain No WBCs or organisms seen    COVID PRE-OP / PRE-PROCEDURE SCREENING ORDER (NO ISOLATION) - Swab, Nasopharynx [[id number]]  (Normal) Collected: 06/09/21 2319    Specimen: Swab from Nasopharynx Updated: 06/10/21 0322    Narrative:      The following orders were created for panel order COVID PRE-OP / PRE-PROCEDURE SCREENING ORDER (NO ISOLATION) - Swab, Nasopharynx.  Procedure                               Abnormality         Status                     ---------                               -----------         ------                     COVID-19,APTIMA PANTHER,...[[id number]]  Normal              Final result                 Please view results for these tests on the individual orders.    COVID-19,APTIMA PANTHER,[facility] IN-HOUSE, NP/OP SWAB IN UTM/VTM/SALINE TRANSPORT MEDIA,24 HR TAT - Swab, Nasopharynx [[id number]]  (Normal) Collected: 06/09/21 2319    Specimen: Swab from Nasopharynx Updated: 06/10/21 0322     COVID19 Not Detected    Narrative:      Fact sheet for providers: https://www.fda.gov/media/408071/download     Fact sheet for patients: https://www.fda.gov/media/318695/download    Test performed by RT PCR.    CBC (No Diff) [[id number]]  (Abnormal) Collected: 06/09/21 2204    Specimen: Blood Updated: 06/10/21 0003     WBC 5.71 10*3/mm3      RBC 3.55 10*6/mm3      Hemoglobin 12.9 g/dL      Hematocrit 34.0 %      MCV 95.8 fL      MCH 36.3  pg      MCHC 37.9 g/dL      RDW 15.5 %      RDW-SD 53.8 fl      MPV 9.1 fL      Platelets 113 10*3/mm3     Cryptococcal Antigen [[id number]]  (Normal) Collected: 06/09/21 2015    Specimen: Blood Updated: 06/09/21 2121     Crypto Ag Negative    Comprehensive Metabolic Panel [[id number]]  (Abnormal) Collected: 06/09/21 2015    Specimen: Blood Updated: 06/09/21 2111     Glucose 112 mg/dL      BUN 43 mg/dL      Creatinine 1.00 mg/dL      Sodium 121 mmol/L      Potassium 5.6 mmol/L      Comment: Slight hemolysis detected by analyzer. Results may be affected.        Chloride 86 mmol/L      CO2 28.5 mmol/L      Calcium 9.2 mg/dL      Total Protein 5.7 g/dL      Albumin 2.60 g/dL      ALT (SGPT) 140 U/L      AST (SGOT) 174 U/L      Comment: Slight hemolysis detected by analyzer. Results may be affected.        Alkaline Phosphatase 297 U/L      Total Bilirubin 11.2 mg/dL      eGFR Non African Amer 73 mL/min/1.73      Globulin 3.1 gm/dL      A/G Ratio 0.8 g/dL      BUN/Creatinine Ratio 43.0     Anion Gap 6.5 mmol/L     Narrative:      GFR Normal >60  Chronic Kidney Disease <60  Kidney Failure <15      Uric Acid [[id number]]  (Normal) Collected: 06/09/21 2015    Specimen: Blood Updated: 06/09/21 2109     Uric Acid 6.2 mg/dL     Protime-INR [[id number]]  (Abnormal) Collected: 06/09/21 2015    Specimen: Blood Updated: 06/09/21 2051     Protime 17.9 Seconds      INR 1.50    Blood Culture - Blood, Arm, Left [[id number]] Collected: 06/09/21 2015    Specimen: Blood from Arm, Left Updated: 06/09/21 2038    Blood Culture - Blood, Hand, Left [[id number]] Collected: 06/09/21 2017    Specimen: Blood from Hand, Left Updated: 06/09/21 2038    TSPOT [[id number]] Collected: 06/09/21 2015    Specimen: Blood Updated: 06/09/21 2038    Osmolality, Urine - Urine, Clean Catch [[id number]] Collected: 06/09/21 1548    Specimen: Urine, Clean Catch Updated: 06/09/21 1648     Osmolality, Urine 830 mOsm/kg     Narrative:      Osmo Normal Reference  Ranges:    Random:  mOsm/kg H2O, depending on fluid intake.  Random: >850 mOsm/kg H20, after 12 hour fluid restriction.    24 Hour: 300-900 mOsm/kg H2O.    Urinalysis, Microscopic Only - Urine, Clean Catch [[id number]]  (Abnormal) Collected: 06/09/21 1548    Specimen: Urine, Clean Catch Updated: 06/09/21 1643     RBC, UA None Seen /HPF      WBC, UA 3-5 /HPF      Bacteria, UA 1+ /HPF      Squamous Epithelial Cells, UA 0-2 /HPF      Hyaline Casts, UA 7-12 /LPF      Methodology Manual Light Microscopy    Urinalysis With Microscopic If Indicated (No Culture) - Urine, Clean Catch [[id number]]  (Abnormal) Collected: 06/09/21 1548    Specimen: Urine, Clean Catch Updated: 06/09/21 1633     Color, UA Orange     Comment: Any Substance that causes an abnormal urine color can alter the accuracy of the chemical reactions.        Appearance, UA Clear     pH, UA <=5.0     Specific Gravity, UA 1.029     Glucose, UA Negative     Ketones, UA Negative     Bilirubin, UA Moderate (2+)     Blood, UA Negative     Protein, UA Negative     Leuk Esterase, UA Small (1+)     Nitrite, UA Positive     Urobilinogen, UA 1.0 E.U./dL          Orders (active)      Start     Ordered    06/10/21 1133  If home health approved, please ask home health to obtain CMP and uric acid on 6/14 and fax results to me at [phone].  Thx  Physician Communication Order  Once     Comments: If home health approved, please ask home health to obtain CMP and uric acid on 6/14 and fax results to me at [phone].  Thx    06/10/21 1133    06/10/21 1123  Inpatient Case Management  Consult  Once     Provider:  (Not yet assigned)    06/10/21 1123    06/10/21 0900  ursodiol (ACTIGALL) tablet 500 mg  2 Times Daily      06/10/21 0747    06/10/21 0737  Comprehensive Metabolic Panel  Daily      06/10/21 0736    06/10/21 0000  midodrine (PROAMATINE) 5 MG tablet  3 Times Daily Before Meals      06/10/21 1104    06/09/21 1915  Please call renal ([phone]) this  evening with results of labs once available  Physician Communication Order  Once     Comments: Please call renal ([phone]) this evening with results of labs once available    06/09/21 1914    06/09/21 1853  Blood Culture - Blood, Arm, Left  STAT      06/09/21 1852 06/09/21 1853  Blood Culture - Blood, Hand, Left  Once     Comments: From a different site than #1.      06/09/21 1852    06/09/21 1730  midodrine (PROAMATINE) tablet 5 mg  3 Times Daily Before Meals      06/09/21 1448    06/09/21 1600  Dietary Nutrition Supplements Boost Plus (Ensure Enlive, Ensure Plus)  3 Times Daily      06/09/21 1200    06/09/21 1227  Obtain Informed Consent  Once      06/09/21 1226    06/09/21 1201  Diet Regular; Low Sodium; 2,000 mg Na  Diet Effective Now      06/09/21 1200    06/09/21 1200  Vital Signs  Every 4 Hours      06/09/21 1106    06/09/21 1200  sodium chloride 0.9 % flush 10 mL  Every 12 Hours Scheduled      06/09/21 1106    06/09/21 1139  Orthostatic Blood Pressure  Daily     Comments: Please call Renal if systolic blood pressure falls by 10 points or if heart rate rises by 10 points when he stands    06/09/21 1138    06/09/21 1115  TSPOT  Once      06/09/21 1114    06/09/21 1107  Inpatient Infectious Diseases Consult  Once     Specialty:  Infectious Diseases  Provider:  Rosemarie Love MD    06/09/21 1106    06/09/21 1106  Inpatient Nephrology Consult  Once     Specialty:  Nephrology  Provider:  Kevin Abrams MD    06/09/21 1106    06/09/21 1104  Maintain Sequential Compression Device  Continuous      06/09/21 1106    06/09/21 1104  Activity - Ad Viri  Until Discontinued      06/09/21 1106    06/09/21 1103  Intake & Output  Every Shift      06/09/21 1106    06/09/21 1103  Weigh Patient  Once      06/09/21 1106    06/09/21 1103  Oxygen Therapy- Nasal Cannula; Titrate for SPO2: 90% - 95%  Continuous      06/09/21 1106    06/09/21 1103  Insert Peripheral IV  Once      06/09/21 1106    06/09/21 1103  Saline Lock &  Maintain IV Access  Continuous      21 1106    21 1102  sodium chloride 0.9 % flush 10 mL  As Needed      21 1106    21 1102  guaiFENesin-codeine (GUAIFENESIN AC) 100-10 MG/5ML liquid 5 mL  3 Times Daily PRN      21 1106                       Physician Progress Notes       Ken Martinez MD at 06/10/21 1117              [facility] Progress Note      Patient Name: Harris Christian  : 1945  MRN: [id number]  Primary Care Physician:  Provider, No Known  Date of admission: 2021    Subjective     Interval History:   Appetite much better; no SOB; still orthostatic, but BP drop is less dramatic than yesterday's.  No abd pain; no N/V.  Eager to go home    Review of Systems:   14 point review of systems is otherwise negative except for mentioned above on HPI    Objective     Vitals:   Temp:  [96.6 °F (35.9 °C)-97.6 °F (36.4 °C)] 96.6 °F (35.9 °C)  Heart Rate:  [61-88] 81  Resp:  [16-18] 16  BP: ()/(47-89) 77/47  Flow (L/min):  [2] 2    Intake/Output Summary (Last 24 hours) at 6/10/2021 1117  Last data filed at 6/10/2021 0600  Gross per 24 hour   Intake 2090 ml   Output 6200 ml   Net -4110 ml     Wt Readings from Last 1 Encounters:   21 1109 86.2 kg (190 lb 0.6 oz)     Wt Readings from Last 3 Encounters:   21 86.2 kg (190 lb 0.6 oz)   21 86.2 kg (190 lb)   21 95.3 kg (210 lb)       Physical Exam:    NAD; pleasant; oriented but flat affect  Chronically ill-appearing; jaundiced  MMM; +scleral icterus  No JVD at 45 degrees; temporal wasting present; atraumatic head   No eye discharge; no carotid bruits  No breath sounds lower third on left; crackles right base, tho less than yesterday  Not labored on RA  RRR, no rub  Soft, NT, ND, BS+  +1-2 edema, tho less prominent and softer than yesterday  Livido both lower legs, but fainter than yesterday  No clubbing  No asterixis  Moves all extremities     Scheduled Meds:      midodrine, 5 mg, Oral, TID AC  sodium chloride, 10 mL, Intravenous, Q12H  ursodiol, 500 mg, Oral, BID      IV Meds:        Results Reviewed:   I have personally reviewed the results from the time of this admission to 6/10/2021 11:17 EDT     Lab Results   Component Value Date    GLUCOSE 107 (H) 06/10/2021    CALCIUM 9.6 06/10/2021     (L) 06/10/2021    K 5.4 (H) 06/10/2021    CO2 26.3 06/10/2021    CL 89 (L) 06/10/2021    BUN 43 (H) 06/10/2021    CREATININE 1.16 06/10/2021    EGFRIFAFRI 100 06/11/2020    EGFRIFNONA 62 06/10/2021    BCR 37.1 (H) 06/10/2021    ANIONGAP 7.7 06/10/2021      Lab Results   Component Value Date    MG 2.6 (H) 06/10/2021    PHOS 2.3 (L) 06/10/2021    ALBUMIN 2.40 (L) 06/10/2021     US Paracentesis    Result Date: 6/9/2021  ULTRASOUND-GUIDED PARACENTESIS 06/09/2021  HISTORY: Ascites  After signed informed consent was obtained the left lower quadrant was prepped and draped in the usual sterile fashion. Lidocaine was used for local anesthesia.  The paracentesis catheter was inserted into the left lower quadrant using ultrasound guidance. 6000 of ascites was removed.  Confirmatory images were obtained.  Patient tolerated the procedure well with no complications.      Impression: 1. Ultrasound-guided paracentesis as described.  This report was finalized on 6/9/2021 1:58 PM by Dr. Amol Bagley M.D.      US Paracentesis    Result Date: 5/28/2021  ULTRASOUND-GUIDED PARACENTESIS 05/20/2021  HISTORY: Ascites  After signed informed consent was obtained the left lower quadrant was prepped and draped in the usual sterile fashion. Lidocaine was used for local anesthesia.  The paracentesis catheter was inserted into the left lower quadrant using ultrasound guidance. 4300 cc of ascites was removed.  Confirmatory images were obtained.  Patient tolerated the procedure well with no complications.      Impression: 1. Ultrasound-guided paracentesis as described.  This report was finalized on  "5/28/2021 8:32 AM by Dr. Amol Bagley M.D.         Assessment / Plan     ASSESSMENT:  1.  Hyponatremia with hypervolemia (lower extremity edema; ascites), slightly better, though in face of cirrhosis, progressive liver disease, poor solute intake with weight loss, hypotension, and pulmonary disease.  Normal TSH.  Low urine Na and high Uosm.  2.  Prerenal azotemia, likely due to hypotension and cirrhosis.  Has mild hyperkalemia d/t recent use of spironolactone  3.  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  HCC s/p stereotactic radiation roughly 1 year ago  5.  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  Chronic nocturnal hypoxia and hypoventilation on Trilogy device at home  7.  Hypotension on midodrine    PLAN:  1.  Ok for d/c from renal view, tho will leave spironolactone off for now (with plan to restart once potassium back to normal; will re-check K+ early next week)  2.  Low threshold to increase midodrine further if blood pressures require; wife will contact me with blood pressure readings in the next few days  3.  Resume oral furosemide 40 mg once daily starting tomorrow  4.  Fluid restriction 1.5 L/day  5.  Would \"liberalize\" sodium restriction to 2 g daily; he has had significant weight loss and malnutrition, likely in part due to unpalatable diet employing 1.5 g sodium restriction daily  6.  Will ask home health to assist patient:  he is too frail to travel for his multiple lab checks.  Will request outpatient renal panel to be done on 6/14  7.  Discussed with wife at bedside    Thank you for involving us in the care of Harris Christian.  Please feel free to call with any questions.    Ken Martinez MD  06/10/21  11:17 EDT    [facility]  [phone]      Much of this encounter note is an electronic transcription/translation of spoken language to printed text. The electronic translation of spoken " language may permit erroneous, or at times, nonsensical words or phrases to be inadvertently transcribed. Although I have reviewed the note for such errors, some may still exist.             Electronically signed by Ken Martinez MD at 06/10/21 1132     Marcel Duggan MD at 06/10/21 0911           LOS: 1 day     Chief Complaint:  Follow-up cirrhosis    Interval History:  No fever. No new symptoms today. S/P 6 L paracentesis yesterday. Cell counts not consistent with infection. Eager for discharge today.     ROS: fair appetite, no vomiting; easy skin tearing    Vital Signs  Temp:  [96.6 °F (35.9 °C)-97.6 °F (36.4 °C)] 96.6 °F (35.9 °C)  Heart Rate:  [61-88] 81  Resp:  [16-18] 16  BP: ()/(47-89) 77/47    Physical Exam:  General: awake, very nice, in chair  ENT: MMM  Cardiovascular: NR, + pitting BLE edema  Respiratory: normal work of breathing on ambient air  GI: Abdomen is distended though improved  :  no Spears catheter  Skin: skin tears on arms and legs but no heat, streaking erythema, or purulent drainage  Neurological: Alert and oriented x 3  Psychiatric: Normal mood and affect     Meds:    Current Facility-Administered Medications:   •  guaiFENesin-codeine (GUAIFENESIN AC) 100-10 MG/5ML liquid 5 mL, 5 mL, Oral, TID PRN, Miguel Duran MD  •  midodrine (PROAMATINE) tablet 5 mg, 5 mg, Oral, TID AC, Ken Martinez MD  •  sodium chloride 0.9 % flush 10 mL, 10 mL, Intravenous, Q12H, Miguel Duran MD, 10 mL at 06/10/21 0836  •  sodium chloride 0.9 % flush 10 mL, 10 mL, Intravenous, PRN, Miguel Duran MD  •  ursodiol (ACTIGALL) tablet 500 mg, 500 mg, Oral, BID, Veronica Good MD    LABS:  Micro reviewed today  Lab Results   Component Value Date    WBC 5.71 06/09/2021    HGB 12.9 (L) 06/09/2021    HCT 34.0 (L) 06/09/2021    MCV 95.8 06/09/2021     (L) 06/09/2021     Lab Results   Component Value Date    GLUCOSE 112 (H) 06/09/2021    BUN 43 (H) 06/09/2021     CREATININE 1.00 06/09/2021    EGFRIFNONA 73 06/09/2021    EGFRIFAFRI 100 06/11/2020    BCR 43.0 (H) 06/09/2021    CO2 28.5 06/09/2021    CALCIUM 9.2 06/09/2021    PROTENTOTREF 6.9 06/11/2020    ALBUMIN 2.60 (L) 06/09/2021    LABIL2 1.0 06/11/2020     (H) 06/09/2021     (H) 06/09/2021     Lab Results   Component Value Date    INR 1.50 (H) 06/09/2021    INR 1.53 (H) 06/08/2021    INR 1.0 05/28/2021    PROTIME 17.9 (H) 06/09/2021    PROTIME 18.2 (H) 06/08/2021    PROTIME 12.3 (L) 05/28/2021     [facility] 1/29/21:  Hep A IgG positive  Hep B sAg negative  Hep B sAb negative  Hep C Ab negative     Viral Hepatitis Panel (3/2/20):  Hep A IgM negative  Hep B sAg negative  Hep B c IgM negative  Hep C Ab negative     5/6 Paracentesis:   Negative for malignant cells     [facility] 5/3/21:  HIV Ab negative  Syphilis Ab negative  Crypto Ag negative  CMV IgG > 10  EBV IgG positive >750  VZV IgG positive  HSV 1 Ab positive  HSV 2 Ab negative     Paracentesis Labs 6/9/21:  122 Nucleated cells (15% PMNs)    ---------------------------------------------------------------------------------  Crypto Ag negative  UA 3-5 WBCs    Microbiology:  4/2 Wound Cx: scant growth Corynebacterium species  6/9 Paracentesis Cx: NGTD  6/9 BCx: NGTD  6/9 COVID: negative    Radiology (personally reviewed report):   Duplex Portal Hepatic System: normal flow in hepatic and portal veins    Assessment/Plan   1. Autoimmune hepatitis and decompensated cirrhosis  2. Hepatocellular carcinoma - treated  3. Hyponatremia  4. Hyperbilirubinemia  5. Acute kidney injury  6. Pre-transplant evaluation     Overnight he has been afebrile. He has a normal WBC. No new symptoms today. His paracentesis labs were not consistent with infection. I will ask the micro lab to hold the sample for 14 days to make sure nothing late-growing though I think this is unlikely. I have asked them to add on an anaerobic culture as well. Blood cultures are NGTD. No objection to  discharge from an infection standpoint. I will follow-up his blood cultures are discharge.     He received his 1st dose of the Hep B vaccine yesterday. He will need another dose around 7/9 (one month) and 12/9 (6 months) to complete the series. I have asked CCP if this can be done through home health. If not, we will try to find something convenient for him closer to home.     AM CMP pending. I will follow-up GI and renal evaluations today.           Electronically signed by Marcel Duggan MD at 06/10/21 1000          Consult Notes       Veronica Good MD at 06/09/21 1108          [facility]  Initial Inpatient Consult Note    Referring Provider: Dr Duran    Reason for Consultation: Cirrhosis    Subjective     History of present illness:      Thank you for requesting my opinion.    This is a 75-year-old man with a diagnosis of cirrhosis, thought to be autoimmune hepatitis versus primary biliary cholangitis.  He was diagnosed with hepatocellular carcinoma in the summer 2020 and was treated with stereotactic radiation at [facility].  He has also been seen by the [facility] transplant center and is in the process of work-up for liver transplantation.  He will be seen there again later this month    He was admitted for progression of his disease.  He had labs done yesterday demonstrating worsening bilirubin, worsening sodium level, worsening renal function.  He has had worsening ascites.  He has been on diuretics.  He has had hypotension.  Diuretics were held yesterday and he has been taking midodrine.    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  No evidence of SBP    Work-up for causes of his cirrhosis have demonstrated an elevated mitochondrial antibody.  He tried ursodiol for 1  day but did not tolerate it very well.  He has not been on it since.    MELD-NA 29    EGD March this year with no varices    Past Medical History:  Past Medical History:   Diagnosis Date   • CAD (coronary artery disease)    • Cirrhosis of liver without ascites (CMS/HCC)    • Cobalamin deficiency 6/15/2016   • COPD (chronic obstructive pulmonary disease) (CMS/HCC)     paralyzed left diaphragm   • Elevated hemidiaphragm 06/15/2016   • Hepatocellular carcinoma (CMS/HCC) 7/21/2020   • History of radiation therapy     SUMMER OF 2020 FOR LIVER CANCER.    • Hypertension    • Lower extremity edema    • Phrenic nerve palsy 07/27/2017   • Right inguinal hernia    • Sleep apnea     USES CPAP   • Vitamin B 12 deficiency        Past Surgical History:  Past Surgical History:   Procedure Laterality Date   • CARDIAC CATHETERIZATION N/A 6/3/2016    Procedure: Coronary angiography;  Surgeon: Александр Man MD;  Location: [facility] CATH INVASIVE LOCATION;  Service:    • CARDIAC CATHETERIZATION N/A 6/3/2016    Procedure: Right and Left Heart Cath;  Surgeon: Александр Man MD;  Location: [facility] CATH INVASIVE LOCATION;  Service:    • CARDIAC CATHETERIZATION N/A 6/3/2016    Procedure: Left ventriculography;  Surgeon: Александр Man MD;  Location: [facility] CATH INVASIVE LOCATION;  Service:    • ENDOSCOPY N/A 3/10/2021    Procedure: ESOPHAGOGASTRODUODENOSCOPY;  Surgeon: Miguel Duran MD;  Location: [facility] OR;  Service: General;  Laterality: N/A;   • INGUINAL HERNIA REPAIR Right 3/10/2021    Procedure: open right inguinal hernia repair with mesh;  Surgeon: Miguel Duran MD;  Location: [facility] OR;  Service: General;  Laterality: Right;   • NOSE SURGERY          Social History:   Social History     Tobacco Use   • Smoking status: Never Smoker   • Smokeless tobacco: Never Used   Substance Use Topics   • Alcohol use: Yes     Comment: rare/  Daily caffeine use        Family History:  Family History   Problem Relation  Age of Onset   • Heart attack Father    • Cancer Father    • Alcohol abuse Father    • Dementia Father    • Colon cancer Neg Hx    • Colon polyps Neg Hx    • Malig Hyperthermia Neg Hx        Home Meds:  Medications Prior to Admission   Medication Sig Dispense Refill Last Dose   • furosemide (LASIX) 20 MG tablet Take 20-40 mg by mouth Daily. WILL TAKE TWO DEPENDANT IF THERE IS SWELLING   6/9/2021 at Unknown time   • midodrine (PROAMATINE) 2.5 MG tablet Take 1 tablet by mouth 3 (Three) Times a Day Before Meals for 30 days. 3 tablet 0 6/9/2021 at Unknown time   • spironolactone (ALDACTONE) 25 MG tablet Take 50 mg by mouth Daily.   6/9/2021 at Unknown time   • ursodiol (ACTIGALL) 500 MG tablet Take 500 mg by mouth Daily.   6/9/2021 at Unknown time   • Cyanocobalamin 1000 MCG/ML kit Inject 1,000 mcg as directed every 3 (three) months.      • guaiFENesin-codeine (GUAIFENESIN AC) 100-10 MG/5ML liquid Take 5 mL by mouth 3 (Three) Times a Day As Needed for Cough. 473 mL 0        Current Meds:   midodrine, 2.5 mg, Oral, TID AC  sodium chloride, 10 mL, Intravenous, Q12H  spironolactone, 50 mg, Oral, Daily  ursodiol, 500 mg, Oral, Daily        Allergies:  No Known Allergies    Review of Systems  All systems were reviewed and negative except for:  Constitution:  positive for fatigue  Cardiovascular: positive for  lower extremity edema  Gastrointestinal: positive for  abdominal distension     Objective     Vital Signs       Physical Exam:  Constitutional:   Alert, cooperative, in no acute distress, appears stated age   Eyes:           Lids and lashes normal, conjunctivae normal, scleral icterus   Ears, nose, mouth and throat:  Normal appearance of external ears and nose, no oral l  lesions, no thrush, oral mucosa moist   Respiratory:    Clear to auscultation, respirations regular, even and             unlabored    Cardiovascular:   Regular rhythm and normal rate, normal S1 and S2, no        murmur, no gallop, palpable distal  pulses, 3+ LE edema bilat   Gastrointestinal:    Soft, mildly distended, nontender to palpation, no guarding, no rebound tenderness, normal bowel sounds, no palpable masses or organomegaly  Rectal exam: deferred   Musculoskeletal:  Normal station, no atrophy, no tenderness to palpation, normal digits and nails   Skin:  Jaundiced, no bleeding, bruising, rashes or lesions   Lymphatics:  No palpable cervical or supraclavicular adenopathy   Psychiatric:  Judgement and insight: normal   Orientation to person, place and time: normal   Mood and affect: normal       Results Review:   I reviewed the patient's new clinical results.          Results from last 7 days   Lab Units 06/08/21  1107   SODIUM mmol/L 121*   POTASSIUM mmol/L 5.5*   CHLORIDE mmol/L 85*   CO2 mmol/L 29.1*   BUN mg/dL 46*   CREATININE mg/dL 1.26   CALCIUM mg/dL 9.7   BILIRUBIN mg/dL 9.8*   ALK PHOS U/L 323*   ALT (SGPT) U/L 149*   AST (SGOT) U/L 186*   GLUCOSE mg/dL 116*       Results from last 7 days   Lab Units 06/08/21  1103   INR  1.53*       No results found for: LIPASE    Radiology:  Imaging Results (Last 72 Hours)     ** No results found for the last 72 hours. **          Assessment/Plan       Cirrhosis (CMS/HCC)      Impression  1. Decompensated cirrhosis: suspected autoimmune/PBC.  Has had an acute on chronic decline.  Current MELD Na of 29    2. Acute on chronic liver failure    3. Ascites, lower extremity edema    4. Hyponatremia    5. ANNA MARIE    6. HCC: s/p radiation 1 year ago; 5/13/2021 MRI with subtle nodular rim enhancement, noted to be equivocal findings    Plan  Paracentesis with complete fluid studies  Portal duplex ultrasound to rule out thrombus  Defer to nephrology regarding hyponatremia, ANNA MARIE; suspected to be intravascular depleted  3 meals, 3 snacks daily as well as bedtime snack  Consideration of IV acetylcysteine 150mg/kg daily for acute on chronic liver failure but will give him the next 24 hours to see how he does before starting  this  Close follow-up with [facility]  Consider evaluation by local transplant hepatologists      I discussed the patients findings and my recommendations with patient and family    All necessary PPE, including face mask and eye protection, were worn during this encounter.  Hand sanitization was performed both before and after the patient interaction.    Veronica Good MD  [facility]      Dictated utilizing Dragon dictation      Electronically signed by Veronica Good MD at 06/09/21 1237     Marcel Duggan MD at 06/09/21 1059          Referring Provider: Miguel Duran MD  [address] [address]    Reason for Consultation: infectious workup prior to liver transplant    History of present illness:  Harris Christian is a 75 y.o. with decompensated cirrhosis likely due to autoimmune hepatitis and history of  HCC s/p radiation who I am asked to evaluate and give opinion for infectious workup in the context of worsening ascites, hyponatremia, and hyperbilirubinemia with hopes of being approved for liver transplantation in the near future. History is obtained from the patient, his wife, and review of the old/outside medical records which I summarize/synthesize as follows: Around February 2020 he started having shortness of breath and was diagnosed with cirrhosis. About 3 months later he had imaging that showed a liver mass consistent with HCC and he underwent treatment with stereotactic radiation at [facility]. In March 2021 he establish with the [facility] and has been undergoing pre-transplant evaluation through their clinic. His last visit there was on 5/21/21.     Over the past 6 week he's had worsening shortness of breath, LE edema and ascites for which he has required 3 paracentesses. He really hasn't been having much in the way of infectious symptoms such as fevers, chills, sweats, dysuria, skin lesions, rashes, headache, neck  stiffness, diarrhea, vomiting, or cough. He's had some skin tears on the legs due to swelling but no heat, erythema, or purulent drainage.     He had labs done yesterday that showed a Na of 121 (previously baseline had been ~134-137) and  Tbili of 9 (previously in the 3-4 range). His MELD calculated to 29 and [facility] transplant clinic was contacted. They were concerned that these lab changes were due to infection so he has been admitted to the hospital for further workup.     He denies a past history of serious or unusual infection. No history of sepsis. No history of fungal infections. He has a pet dog but no other animal exposures. No recent bites or scratches. No recent travel. 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 No painful swallowing. His appetite has been depressed. Due to the recurrent ascites, he has really been cutting back on his sodium intake in the past few weeks.     PMH:  HTN  B12 deficiency  Elevated hemidiaphragm  Phrenic nerve palsy  Cirrhosis  HCC    Past Surgical History:   Procedure Laterality Date   • CARDIAC CATHETERIZATION N/A 6/3/2016    Procedure: Coronary angiography;  Surgeon: Александр Man MD;  Location: [facility] INVASIVE LOCATION;  Service:    • CARDIAC CATHETERIZATION N/A 6/3/2016    Procedure: Right and Left Heart Cath;  Surgeon: Александр Man MD;  Location: [facility] CATH INVASIVE LOCATION;  Service:    • CARDIAC CATHETERIZATION N/A 6/3/2016    Procedure: Left ventriculography;  Surgeon: Александр Man MD;  Location: [facility] INVASIVE LOCATION;  Service:    • ENDOSCOPY N/A 3/10/2021    Procedure: ESOPHAGOGASTRODUODENOSCOPY;  Surgeon: Miguel Duran MD;  Location: [facility] OR;  Service: General;  Laterality: N/A;   • INGUINAL HERNIA REPAIR Right 3/10/2021    Procedure: open right inguinal hernia repair with mesh;  Surgeon: Roger  MD Miguel;  Location: [facility] MAIN OR;  Service: General;  Laterality: Right;   • NOSE SURGERY         Social History:      Non-smoker    Family History:  Dad: CAD    Antibiotic allergies and intolerances:  None    Medications:    Current Facility-Administered Medications:   •  guaiFENesin-codeine (GUAIFENESIN AC) 100-10 MG/5ML liquid 5 mL, 5 mL, Oral, TID PRN, Miguel Duran MD  •  midodrine (PROAMATINE) tablet 2.5 mg, 2.5 mg, Oral, TID AC, Miguel Duran MD  •  sodium chloride 0.9 % flush 10 mL, 10 mL, Intravenous, Q12H, Miguel Duran MD  •  sodium chloride 0.9 % flush 10 mL, 10 mL, Intravenous, PRN, Miguel Duran MD  •  [START ON 6/10/2021] ursodiol (ACTIGALL) tablet 500 mg, 500 mg, Oral, Daily, Miguel Duran MD    Review of Systems  All systems were reviewed and are negative unless otherwise stated above in the HPI; pertinent positives are abdominal swelling, shortness of breath, and fatigue    Objective   Vital Signs   VS pending    Physical Exam:   General: awake, very nice  Head: atraumatic  Eyes: + jaundice, wears glasses  ENT: MMM  Neck: Supple  Cardiovascular: NR, RR, no murmurs, + pitting BLE edema  Respiratory: Lungs are clear to auscultation bilaterally, no rales or wheezing; normal work of breathing on ambient air  GI: Abdomen is distended, non-tender, distant but audible bowel sounds  :  no Spears catheter  Musculoskeletal: no joint effusions, normal musculature  Skin: skin tears on arms and legs but no heat, streaking erythema, or purulent drainage  Neurological: Alert and oriented x 3, motor strength symmetric  Psychiatric: Normal mood and affect     Labs:     Lab Results   Component Value Date    WBC 5.64 05/28/2021    HGB 14.1 05/28/2021    HCT 40.1 05/28/2021    MCV 99.5 (H) 05/28/2021     05/28/2021       Lab Results   Component Value Date    GLUCOSE 116 (H) 06/08/2021    BUN 46 (H) 06/08/2021    CREATININE 1.26 06/08/2021    EGFRIFNONA 56 (L)  06/08/2021    EGFRIFAFRI 100 06/11/2020    BCR 36.5 (H) 06/08/2021    CO2 29.1 (H) 06/08/2021    CALCIUM 9.7 06/08/2021    PROTENTOTREF 6.9 06/11/2020    ALBUMIN 2.40 (L) 06/08/2021    LABIL2 1.0 06/11/2020     (H) 06/08/2021     (H) 06/08/2021     Lab Results   Component Value Date    INR 1.53 (H) 06/08/2021    INR 1.0 05/28/2021    INR 1.33 (H) 05/21/2021    PROTIME 18.2 (H) 06/08/2021    PROTIME 12.3 (L) 05/28/2021    PROTIME 16.3 (H) 05/21/2021       ------------------------------------------------    [facility] 1/29/21:  Hep A IgG positive  Hep B sAg negative  Hep B sAb negative  Hep C Ab negative    Viral Hepatitis Panel (3/2/20):  Hep A IgM negative  Hep B sAg negative  Hep B c IgM negative  Hep C Ab negative    5/6 Paracentesis:   Negative for malignant cells    [facility] 5/3/21:  HIV Ab negative  Syphilis Ab negative  Crypto Ag negative  CMV IgG > 10  EBV IgG positive >750  VZV IgG positive  HSV 1 Ab positive  HSV 2 Ab negative    Microbiology:  4/2 Wound Cx: scant growth Corynebacterium species    Radiology (personally reviewed reports):  1/18/21 MRI Abd: There has been slight interval decrease in size of the 3.1 x 2.6 cm  medial hepatic segment mass. No definite new lesions are seen.Development of a moderately large volume of ascites. Some of thevarices appear more prominent likely secondary to progression of portal hypertension.    5/25/21: 4300 cc paracentesis    Assessment/Plan   1. Autoimmune hepatitis and decompensated cirrhosis  2. Hepatocellular carcinoma - treated  3. Hyponatremia  4. Hyperbilirubinemia  5. Acute kidney injury  6. Pre-transplant evaluation    While I think it's most likely that his worsening labs are due to progressive liver disease and poor PO intake (worsenign hyponatremia specifically), I agree that we should start an infectious workup with blood cultures, paracentesis sent for cell count and cultures, urinalysis, and Crypto Ag. If no clear answers there and concern  for infection persists, I would obtain a RUQ US or CT A/P.     He's had much of his pre-liver transplant infectious workup done at  (ie CMV, EBV, etc--see above under labs). He is not immune to Hepatitis B so I will start that vaccine series today. I will also check a Tspot.     Trend temperature curve and target further workup to any new symptoms.     ID will follow. D/W Dr Good and DANIEL Martinez.       Electronically signed by Marcel Duggan MD at 06/09/21 1211     Ken Martinez MD at 06/09/21 1032            Referring Provider: Dr. Miguel Duran  Reason for Consultation: hypoNa    Subjective     Chief complaint No chief complaint on file.      History of present illness:  74 yo WM with normal renal function at baseline admitted today for further evaluation of worsening hyperbilirubinemia, hyponatremia, and malaise.  Outpatient labs yesterday revealed serum sodium 121, potassium 5.5, BUN 46, and creatinine 1.3.  PMH outlined below; pertinent is cirrhosis (liver biopsy in 2016) attributed to autoimmune hepatitis vs PBC; HCC (July '20) treated with stereotactic radiation; elevated left hemidiaphragm; nocturnal hypoxia and hypoventilation on ?Trilogy device at home; and ongoing evaluation at  for liver transplant.    · He has lost 25 pounds of weight since January this year  · 3 paracenteses over the last 6 weeks or so; last one--4 L removed--performed 9 days ago; patient indicates that he has felt wiped out since then  · Has dyspnea with exertion; chronic orthopnea; no chest pain  · Waxing and waning lower extremity swelling, with temporary improvement following paracentesis  · Diuretic regimen includes 40 mg Lasix and 50 mg spironolactone each morning; he does report surge in UOP for a few hours afterwards  · Compliant with low-salt (1.5 g daily) diet, tho on relatively generous fluid restriction (only 2 L/day)  · Marked decline in appetite, especially bad over the last few days  · Balance  has worsened over the last few weeks; wife must assist him with walking now; does describe orthostatic symptoms    Past Medical History:   Diagnosis Date   • CAD (coronary artery disease)    • Cirrhosis of liver without ascites (CMS/HCC)    • Cobalamin deficiency 6/15/2016   • COPD (chronic obstructive pulmonary disease) (CMS/HCC)     paralyzed left diaphragm   • Elevated hemidiaphragm 06/15/2016   • Hepatocellular carcinoma (CMS/HCC) 7/21/2020   • History of radiation therapy     SUMMER OF 2020 FOR LIVER CANCER.    • Hypertension    • Lower extremity edema    • Phrenic nerve palsy 07/27/2017   • Right inguinal hernia    • Sleep apnea     USES CPAP   • Vitamin B 12 deficiency      Past Surgical History:   Procedure Laterality Date   • CARDIAC CATHETERIZATION N/A 6/3/2016    Procedure: Coronary angiography;  Surgeon: Александр Man MD;  Location: [facility] CATH INVASIVE LOCATION;  Service:    • CARDIAC CATHETERIZATION N/A 6/3/2016    Procedure: Right and Left Heart Cath;  Surgeon: Александр Man MD;  Location: [facility] CATH INVASIVE LOCATION;  Service:    • CARDIAC CATHETERIZATION N/A 6/3/2016    Procedure: Left ventriculography;  Surgeon: Александр Man MD;  Location: [facility] CATH INVASIVE LOCATION;  Service:    • ENDOSCOPY N/A 3/10/2021    Procedure: ESOPHAGOGASTRODUODENOSCOPY;  Surgeon: Miguel Duran MD;  Location: [facility];  Service: General;  Laterality: N/A;   • INGUINAL HERNIA REPAIR Right 3/10/2021    Procedure: open right inguinal hernia repair with mesh;  Surgeon: Miguel Duran MD;  Location: [facility];  Service: General;  Laterality: Right;   • NOSE SURGERY       Family History   Problem Relation Age of Onset   • Heart attack Father    • Cancer Father    • Alcohol abuse Father    • Dementia Father    • Colon cancer Neg Hx    • Colon polyps Neg Hx    • Malig Hyperthermia Neg Hx      Social History     Tobacco Use   • Smoking status: Never Smoker   • Smokeless tobacco: Never Used    Vaping Use   • Vaping Use: Never used   Substance Use Topics   • Alcohol use: Yes     Comment: rare/  Daily caffeine use   • Drug use: No     Medications Prior to Admission   Medication Sig Dispense Refill Last Dose   • Cyanocobalamin 1000 MCG/ML kit Inject 1,000 mcg as directed every 3 (three) months.      • furosemide (LASIX) 20 MG tablet Take 20-40 mg by mouth Daily. WILL TAKE TWO DEPENDANT IF THERE IS SWELLING      • guaiFENesin-codeine (GUAIFENESIN AC) 100-10 MG/5ML liquid Take 5 mL by mouth 3 (Three) Times a Day As Needed for Cough. 473 mL 0    • midodrine (PROAMATINE) 2.5 MG tablet Take 1 tablet by mouth 3 (Three) Times a Day Before Meals for 30 days. 3 tablet 0    • spironolactone (ALDACTONE) 25 MG tablet Take 50 mg by mouth Daily.      • ursodiol (ACTIGALL) 500 MG tablet Take 500 mg by mouth Daily.        Allergies:  Patient has no known allergies.    Review of Systems  14-point ROS performed and all negative except for pertinent +/-'s detailed in HPI.     Objective     Vital Signs            No intake/output data recorded.  No intake/output data recorded.  No intake or output data in the 24 hours ending 06/09/21 1032    Physical Exam:  NAD; pleasant; oriented but slightly blunted; looks older than stated age  Chronically ill-appearing; jaundiced  MMM; +scleral icterus  No JVD at 45 degrees; temporal wasting present; atraumatic head   No hepatojugular reflex  No eye discharge; no carotid bruits  Breath sounds lower third on left; crackles right base; not labored on RA  RRR, no rub  Soft, NT, +D, + ascites BS+  +2 edema  Libido both lower legs; acrocyanosis; extremities cool  No clubbing  Subtle asterixis  Moves all extremities     Results Review:  Results from last 7 days   Lab Units 06/08/21  1107   SODIUM mmol/L 121*   POTASSIUM mmol/L 5.5*   CHLORIDE mmol/L 85*   CO2 mmol/L 29.1*   BUN mg/dL 46*   CREATININE mg/dL 1.26   CALCIUM mg/dL 9.7   BILIRUBIN mg/dL 9.8*   ALK PHOS U/L 323*   ALT (SGPT) U/L 149*    AST (SGOT) U/L 186*   GLUCOSE mg/dL 116*       Estimated Creatinine Clearance: 53.9 mL/min (by C-G formula based on SCr of 1.26 mg/dL).                Results from last 7 days   Lab Units 06/08/21  1103   INR  1.53*       Active Medications    No current facility-administered medications for this encounter.      Assessment/Plan   Assessment  1.  Hyponatremia with hypervolemia (lower extremity edema; ascites), though in face of cirrhosis, progressive liver disease, poor solute intake, hypotension, and pulmonary disease.  Normal TSH yesterday.  2.  Prerenal azotemia, likely due to hypotension and cirrhosis.  Has mild hyperkalemia on spironolactone  3.  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  HCC s/p stereotactic radiation roughly 1 year ago  5.  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  Chronic nocturnal hypoxia and hypoventilation on ?Trilogy device at home  7.  Hypotension        * No active hospital problems. *      Plan  1.  Discontinue spironolactone for now in face of hyperkalemia  2.  FENa and Uosm  3.  Orthostatics; low threshold to give small volume of saline if he is orthostatic  4.  Double midodrine dose to 5 mg TID  5.  One dose of IV albumin following paracentesis    I discussed the patient's findings and my recommendations with patient's wife at bedside and with Dr. Marcel Martinez MD  06/09/21  10:32 EDT              Electronically signed by Ken Martinez MD at 06/09/21 8322

## 2021-06-10 NOTE — PROGRESS NOTES
[facility]  Inpatient Progress Note    Reason for Follow Up:  Cirrhosis    Subjective     Interval History:   6 L removed with paracentesis yesterday.  No evidence of SBP.  Doppler negative for clot.  Ready for home.      Current Facility-Administered Medications:   •  guaiFENesin-codeine (GUAIFENESIN AC) 100-10 MG/5ML liquid 5 mL, 5 mL, Oral, TID PRN, Miguel Duran MD  •  midodrine (PROAMATINE) tablet 5 mg, 5 mg, Oral, TID AC, Ken Martinez MD, 5 mg at 06/10/21 1118  •  sodium chloride 0.9 % flush 10 mL, 10 mL, Intravenous, Q12H, Miguel Duran MD, 10 mL at 06/10/21 0836  •  sodium chloride 0.9 % flush 10 mL, 10 mL, Intravenous, PRN, Miguel Duran MD  •  ursodiol (ACTIGALL) tablet 500 mg, 500 mg, Oral, BID, Veronica Good MD  Review of Systems:   All systems reviewed and negative except for:Constitution: fatigue      Objective     Vital Signs  Temp:  [96.6 °F (35.9 °C)-97.6 °F (36.4 °C)] 97.1 °F (36.2 °C)  Heart Rate:  [60-85] 60  Resp:  [16-18] 16  BP: ()/(47-89) 89/69  There is no height or weight on file to calculate BMI.    Intake/Output Summary (Last 24 hours) at 6/10/2021 1308  Last data filed at 6/10/2021 0600  Gross per 24 hour   Intake 2090 ml   Output 200 ml   Net 1890 ml     No intake/output data recorded.     Physical Exam:   General: patient awake, alert and cooperative   Eyes: Normal lids and lashes, + scleral icterus   Neck: supple, normal ROM   Skin: warm and dry, jaundiced   Cardiovascular: regular rhythm and rate, no murmurs auscultated   Pulm: clear to auscultation bilaterally, regular and unlabored   Abdomen: soft, nontender, nondistended; normal bowel sounds   Rectal: deferred   Extremities: no rash or edema   Psychiatric: Normal mood and behavior; memory intact     Results Review:     I reviewed the patient's new clinical results.    Results from last 7 days   Lab Units 06/10/21  0947 06/09/21  2204   WBC 10*3/mm3 5.85 5.71   HEMOGLOBIN  g/dL 13.8 12.9*   HEMATOCRIT % 37.0* 34.0*   PLATELETS 10*3/mm3 121* 113*     Results from last 7 days   Lab Units 06/10/21  0947 06/09/21 2015 06/08/21  1107   SODIUM mmol/L 123* 121* 121*   POTASSIUM mmol/L 5.4* 5.6* 5.5*   CHLORIDE mmol/L 89* 86* 85*   CO2 mmol/L 26.3 28.5 29.1*   BUN mg/dL 43* 43* 46*   CREATININE mg/dL 1.16 1.00 1.26   CALCIUM mg/dL 9.6 9.2 9.7   BILIRUBIN mg/dL 10.5* 11.2* 9.8*   ALK PHOS U/L 332* 297* 323*   ALT (SGPT) U/L 165* 140* 149*   AST (SGOT) U/L 229* 174* 186*   GLUCOSE mg/dL 107* 112* 116*     Results from last 7 days   Lab Units 06/09/21 2015 06/08/21  1103   INR  1.50* 1.53*     No results found for: LIPASE    Radiology:  US Paracentesis   Final Result   1. Ultrasound-guided paracentesis as described.       This report was finalized on 6/9/2021 1:58 PM by Dr. Amol Bagley M.D.          XR Chest PA & Lateral   Final Result          Assessment/Plan     Patient Active Problem List   Diagnosis   • Elevated hemidiaphragm   • Cobalamin deficiency   • Dizziness   • Phrenic nerve palsy   • Hepatocellular carcinoma (CMS/HCC)   • Essential hypertension   • Right inguinal hernia   • Cirrhosis (CMS/HCC)       Impression  1.  Decompensated cirrhosis    2.  Ascites, lower extremity edema    3.  Acute on chronic liver failure    4.  Hyponatremia    5.  ANNA MARIE    6.  History of HCC    Plan  Continue aggressive nutrition efforts--3 meals, 3 snacks including 1 before bedtime  Will arrange for as needed outpatient paracenteses  Pt/family will let me know regarding initiation of xifaxan  Ursodiol dosing increased to 500mg BID for PBC treatment  Close follow-up with [facility]  Ok for home from my standpoint.      I discussed the patients findings and my recommendations with patient and family.    All necessary PPE, including face mask and eye protection, were worn during this encounter.  Hand sanitization was performed both before and after the patient interaction.    Over 25 minutes  was spent reviewing the patient's chart and records, in discussion with the patient, in examination of the patient, and in discussion with members of the patient's medical team.    Veronica Good MD

## 2021-06-10 NOTE — CASE MANAGEMENT/SOCIAL WORK
Case Management Discharge Note      Final Note: Home with spouse and [facility] per private auto.    Provided Post Acute Provider List?: Yes  Post Acute Provider List: Home Health  Delivered To: Support Person  Support Person: Bonita Christian -[phone]  Method of Delivery: In person    Selected Continued Care - Admitted Since 6/9/2021     Destination    No services have been selected for the patient.              Durable Medical Equipment    No services have been selected for the patient.              Dialysis/Infusion    No services have been selected for the patient.              Home Medical Care    No services have been selected for the patient.              Therapy    No services have been selected for the patient.              Community Resources    No services have been selected for the patient.              Community & DME    No services have been selected for the patient.                       Final Discharge Disposition Code: 06 - home with home health care

## 2021-06-10 NOTE — PROGRESS NOTES
[facility] to provide Home Care services. Patient in agreement. Contact Information confirmed. Please call wife, Dr. Bonita Christian to schedule: [phone].

## 2021-06-10 NOTE — CASE MANAGEMENT/SOCIAL WORK
Discharge Planning Assessment  [facility]     Patient Name: Harris Christian  MRN: [id number]  Today's Date: 6/10/2021    Admit Date: 6/9/2021    Discharge Needs Assessment     Row Name 06/10/21 1250       Living Environment    Lives With  spouse    Name(s) of Who Lives With Patient  Bonita Christian -[phone]    Current Living Arrangements  home/apartment/condo    Primary Care Provided by  self    Provides Primary Care For  no one    Family Caregiver if Needed  spouse    Family Caregiver Names  Bonita Christian -[phone]    Quality of Family Relationships  involved;helpful;supportive    Able to Return to Prior Arrangements  yes    Living Arrangement Comments  Lives at home with spouse in a multi-level house. Two steps to enter/no handrails. Bed & bath on 1st floor.       Resource/Environmental Concerns    Resource/Environmental Concerns  none       Transition Planning    Patient/Family Anticipates Transition to  home with family;home with help/services    Patient/Family Anticipated Services at Transition  ;home health care    Transportation Anticipated  family or friend will provide       Discharge Needs Assessment    Readmission Within the Last 30 Days  no previous admission in last 30 days    Equipment Currently Used at Home  other (see comments) Trilogy respirator    Concerns to be Addressed  basic needs;home safety;discharge planning;medication    Concerns Comments  Obtaing Hep B vaccines    Anticipated Changes Related to Illness  inability to care for self    Equipment Needed After Discharge  rollator    Discharge Facility/Level of Care Needs  home with home health    Provided Post Acute Provider List?  Yes    Post Acute Provider List  Home Health    Delivered To  Support Person    Support Person  Bonita Christian -[phone]    Method of Delivery  In person    Patient's Choice of Community Agency(s)  Denies using HH/SNF in the past.    Discharge Coordination/Progress  Spoke with  patient snd spous at bedside regarding dc plans/needs. Confirmed information on facesheet.        Discharge Plan     Row Name 06/10/21 1256       Plan    Plan  Plans dc home with spouse and [facility].    Patient/Family in Agreement with Plan  yes    Plan Comments  Spoke with patient snd spous at bedside regarding dc plans/needs. Plans dc home with spouse and [facility]. Referral called to [facility] (nursing and possibly PT). States able to accept. Spouse says patient uses a Trilogy respirator at home and may need a rollator. States she will look into obtain a rollator at a later time. No additional needs noted. Family to transport per private auto. Continue to follow....[facility]        Continued Care and Services - Admitted Since 6/9/2021     Home Medical Care     Service Provider Request Status Selected Services Address Phone Fax Patient Preferred    [facility]  [facility]A [address] [facility] [address] [address] [phone] --              Expected Discharge Date and Time     Expected Discharge Date Expected Discharge Time    Koffi 10, 2021         Demographic Summary     Row Name 06/10/21 1250       General Information    Admission Type  observation    Referral Source  admission list    Reason for Consult  discharge planning        Functional Status     Row Name 06/10/21 1250       Functional Status    Usual Activity Tolerance  moderate    Current Activity Tolerance  fair       Functional Status, IADL    Medications  assistive person    Meal Preparation  assistive person    Housekeeping  assistive person    Laundry  assistive person    Shopping  assistive person        Psychosocial    No documentation.       Abuse/Neglect    No documentation.       Legal    No documentation.       Substance Abuse    No documentation.       Patient Forms    No documentation.           Karolyn Lott RN

## 2021-06-10 NOTE — H&P
History and physical/discharge summary    Chief complaint: Cirrhosis with hepatic failure    History of present illness: 75-year-old gentleman with history of hepatocellular carcinoma treated with radiation and now progressive cirrhosis with hepatic failure.  Currently being worked up for liver transplant at [facility].  Admitted due to progression of disease evidenced by worsening ascites and lab work.    Physical exam:  Alert and oriented  Lungs: Reasonably unlabored inspiratory effort at rest  Heart: Regular rate    Impression and plan/hospital course: 75-year-old gentleman admitted for progression of cirrhosis/hepatic failure.  He was seen by nephrology, gastroenterology, and infectious disease consultants.  Lab work at discharge included white count of 5.8, hemoglobin of 13.8, GFR of 62, bilirubin of 10.5, AST of 229, ALT of 165, and albumin of 2.4.  Paracentesis was performed with 6 L return.  Volume depletion was suspected and spironolactone was discontinued.  No infectious issue was noted.  Follow-up with  transplant center.

## 2021-06-10 NOTE — PROGRESS NOTES
[facility] Progress Note      Patient Name: Harris Christian  : 1945  MRN: [id number]  Primary Care Physician:  Provider, No Known  Date of admission: 2021    Subjective     Interval History:   Appetite much better; no SOB; still orthostatic, but BP drop is less dramatic than yesterday's.  No abd pain; no N/V.  Eager to go home    Review of Systems:   14 point review of systems is otherwise negative except for mentioned above on HPI    Objective     Vitals:   Temp:  [96.6 °F (35.9 °C)-97.6 °F (36.4 °C)] 96.6 °F (35.9 °C)  Heart Rate:  [61-88] 81  Resp:  [16-18] 16  BP: ()/(47-89) 77/47  Flow (L/min):  [2] 2    Intake/Output Summary (Last 24 hours) at 6/10/2021 1117  Last data filed at 6/10/2021 0600  Gross per 24 hour   Intake 2090 ml   Output 6200 ml   Net -4110 ml     Wt Readings from Last 1 Encounters:   21 1109 86.2 kg (190 lb 0.6 oz)     Wt Readings from Last 3 Encounters:   21 86.2 kg (190 lb 0.6 oz)   21 86.2 kg (190 lb)   21 95.3 kg (210 lb)       Physical Exam:    NAD; pleasant; oriented but flat affect  Chronically ill-appearing; jaundiced  MMM; +scleral icterus  No JVD at 45 degrees; temporal wasting present; atraumatic head   No eye discharge; no carotid bruits  No breath sounds lower third on left; crackles right base, tho less than yesterday  Not labored on RA  RRR, no rub  Soft, NT, ND, BS+  +1-2 edema, tho less prominent and softer than yesterday  Livido both lower legs, but fainter than yesterday  No clubbing  No asterixis  Moves all extremities     Scheduled Meds:     midodrine, 5 mg, Oral, TID AC  sodium chloride, 10 mL, Intravenous, Q12H  ursodiol, 500 mg, Oral, BID      IV Meds:        Results Reviewed:   I have personally reviewed the results from the time of this admission to 6/10/2021 11:17 EDT     Lab Results   Component Value Date    GLUCOSE 107 (H) 06/10/2021    CALCIUM 9.6 06/10/2021     (L) 06/10/2021    K 5.4  (H) 06/10/2021    CO2 26.3 06/10/2021    CL 89 (L) 06/10/2021    BUN 43 (H) 06/10/2021    CREATININE 1.16 06/10/2021    EGFRIFAFRI 100 06/11/2020    EGFRIFNONA 62 06/10/2021    BCR 37.1 (H) 06/10/2021    ANIONGAP 7.7 06/10/2021      Lab Results   Component Value Date    MG 2.6 (H) 06/10/2021    PHOS 2.3 (L) 06/10/2021    ALBUMIN 2.40 (L) 06/10/2021     US Paracentesis    Result Date: 6/9/2021  ULTRASOUND-GUIDED PARACENTESIS 06/09/2021  HISTORY: Ascites  After signed informed consent was obtained the left lower quadrant was prepped and draped in the usual sterile fashion. Lidocaine was used for local anesthesia.  The paracentesis catheter was inserted into the left lower quadrant using ultrasound guidance. 6000 of ascites was removed.  Confirmatory images were obtained.  Patient tolerated the procedure well with no complications.      Impression: 1. Ultrasound-guided paracentesis as described.  This report was finalized on 6/9/2021 1:58 PM by Dr. Amol Bagley M.D.      US Paracentesis    Result Date: 5/28/2021  ULTRASOUND-GUIDED PARACENTESIS 05/20/2021  HISTORY: Ascites  After signed informed consent was obtained the left lower quadrant was prepped and draped in the usual sterile fashion. Lidocaine was used for local anesthesia.  The paracentesis catheter was inserted into the left lower quadrant using ultrasound guidance. 4300 cc of ascites was removed.  Confirmatory images were obtained.  Patient tolerated the procedure well with no complications.      Impression: 1. Ultrasound-guided paracentesis as described.  This report was finalized on 5/28/2021 8:32 AM by Dr. Amol Bagley M.D.         Assessment / Plan     ASSESSMENT:  1.  Hyponatremia with hypervolemia (lower extremity edema; ascites), slightly better, though in face of cirrhosis, progressive liver disease, poor solute intake with weight loss, hypotension, and pulmonary disease.  Normal TSH.  Low urine Na and high Uosm.  2.  Prerenal azotemia,  "likely due to hypotension and cirrhosis.  Has mild hyperkalemia d/t recent use of spironolactone  3.  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  HCC s/p stereotactic radiation roughly 1 year ago  5.  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  Chronic nocturnal hypoxia and hypoventilation on Trilogy device at home  7.  Hypotension on midodrine    PLAN:  1.  Ok for d/c from renal view, tho will leave spironolactone off for now (with plan to restart once potassium back to normal; will re-check K+ early next week)  2.  Low threshold to increase midodrine further if blood pressures require; wife will contact me with blood pressure readings in the next few days  3.  Resume oral furosemide 40 mg once daily starting tomorrow  4.  Fluid restriction 1.5 L/day  5.  Would \"liberalize\" sodium restriction to 2 g daily; he has had significant weight loss and malnutrition, likely in part due to unpalatable diet employing 1.5 g sodium restriction daily  6.  Will ask home health to assist patient:  he is too frail to travel for his multiple lab checks.  Will request outpatient renal panel to be done on 6/14  7.  Discussed with wife at bedside    Thank you for involving us in the care of Harris Christian.  Please feel free to call with any questions.    Ken Martinez MD  06/10/21  11:17 EDT    [facility]  [phone]      Much of this encounter note is an electronic transcription/translation of spoken language to printed text. The electronic translation of spoken language may permit erroneous, or at times, nonsensical words or phrases to be inadvertently transcribed. Although I have reviewed the note for such errors, some may still exist.           "

## 2021-06-10 NOTE — H&P (VIEW-ONLY)
[facility]  Inpatient Progress Note    Reason for Follow Up:  Cirrhosis    Subjective     Interval History:   6 L removed with paracentesis yesterday.  No evidence of SBP.  Doppler negative for clot.  Ready for home.      Current Facility-Administered Medications:   •  guaiFENesin-codeine (GUAIFENESIN AC) 100-10 MG/5ML liquid 5 mL, 5 mL, Oral, TID PRN, Miguel Duran MD  •  midodrine (PROAMATINE) tablet 5 mg, 5 mg, Oral, TID AC, Ken Martinez MD, 5 mg at 06/10/21 1118  •  sodium chloride 0.9 % flush 10 mL, 10 mL, Intravenous, Q12H, Miguel Duran MD, 10 mL at 06/10/21 0836  •  sodium chloride 0.9 % flush 10 mL, 10 mL, Intravenous, PRN, Miguel Duran MD  •  ursodiol (ACTIGALL) tablet 500 mg, 500 mg, Oral, BID, Veronica Good MD  Review of Systems:   All systems reviewed and negative except for:Constitution: fatigue      Objective     Vital Signs  Temp:  [96.6 °F (35.9 °C)-97.6 °F (36.4 °C)] 97.1 °F (36.2 °C)  Heart Rate:  [60-85] 60  Resp:  [16-18] 16  BP: ()/(47-89) 89/69  There is no height or weight on file to calculate BMI.    Intake/Output Summary (Last 24 hours) at 6/10/2021 1308  Last data filed at 6/10/2021 0600  Gross per 24 hour   Intake 2090 ml   Output 200 ml   Net 1890 ml     No intake/output data recorded.     Physical Exam:   General: patient awake, alert and cooperative   Eyes: Normal lids and lashes, + scleral icterus   Neck: supple, normal ROM   Skin: warm and dry, jaundiced   Cardiovascular: regular rhythm and rate, no murmurs auscultated   Pulm: clear to auscultation bilaterally, regular and unlabored   Abdomen: soft, nontender, nondistended; normal bowel sounds   Rectal: deferred   Extremities: no rash or edema   Psychiatric: Normal mood and behavior; memory intact     Results Review:     I reviewed the patient's new clinical results.    Results from last 7 days   Lab Units 06/10/21  0947 06/09/21  2204   WBC 10*3/mm3 5.85 5.71   HEMOGLOBIN  g/dL 13.8 12.9*   HEMATOCRIT % 37.0* 34.0*   PLATELETS 10*3/mm3 121* 113*     Results from last 7 days   Lab Units 06/10/21  0947 06/09/21 2015 06/08/21  1107   SODIUM mmol/L 123* 121* 121*   POTASSIUM mmol/L 5.4* 5.6* 5.5*   CHLORIDE mmol/L 89* 86* 85*   CO2 mmol/L 26.3 28.5 29.1*   BUN mg/dL 43* 43* 46*   CREATININE mg/dL 1.16 1.00 1.26   CALCIUM mg/dL 9.6 9.2 9.7   BILIRUBIN mg/dL 10.5* 11.2* 9.8*   ALK PHOS U/L 332* 297* 323*   ALT (SGPT) U/L 165* 140* 149*   AST (SGOT) U/L 229* 174* 186*   GLUCOSE mg/dL 107* 112* 116*     Results from last 7 days   Lab Units 06/09/21 2015 06/08/21  1103   INR  1.50* 1.53*     No results found for: LIPASE    Radiology:  US Paracentesis   Final Result   1. Ultrasound-guided paracentesis as described.       This report was finalized on 6/9/2021 1:58 PM by Dr. Amol Bagley M.D.          XR Chest PA & Lateral   Final Result          Assessment/Plan     Patient Active Problem List   Diagnosis   • Elevated hemidiaphragm   • Cobalamin deficiency   • Dizziness   • Phrenic nerve palsy   • Hepatocellular carcinoma (CMS/HCC)   • Essential hypertension   • Right inguinal hernia   • Cirrhosis (CMS/HCC)       Impression  1.  Decompensated cirrhosis    2.  Ascites, lower extremity edema    3.  Acute on chronic liver failure    4.  Hyponatremia    5.  ANNA MARIE    6.  History of HCC    Plan  Continue aggressive nutrition efforts--3 meals, 3 snacks including 1 before bedtime  Will arrange for as needed outpatient paracenteses  Pt/family will let me know regarding initiation of xifaxan  Ursodiol dosing increased to 500mg BID for PBC treatment  Close follow-up with [facility]  Ok for home from my standpoint.      I discussed the patients findings and my recommendations with patient and family.    All necessary PPE, including face mask and eye protection, were worn during this encounter.  Hand sanitization was performed both before and after the patient interaction.    Over 25 minutes  was spent reviewing the patient's chart and records, in discussion with the patient, in examination of the patient, and in discussion with members of the patient's medical team.    Veronica Good MD

## 2021-06-10 NOTE — PLAN OF CARE
Problem: Adult Inpatient Plan of Care  Goal: Plan of Care Review  Outcome: Ongoing, Progressing  Goal: Patient-Specific Goal (Individualized)  Outcome: Ongoing, Progressing  Goal: Absence of Hospital-Acquired Illness or Injury  Outcome: Ongoing, Progressing  Intervention: Identify and Manage Fall Risk  Intervention: Prevent Skin Injury  Intervention: Prevent and Manage VTE (venous thromboembolism) Risk  Intervention: Prevent Infection  Goal: Optimal Comfort and Wellbeing  Outcome: Ongoing, Progressing  Intervention: Provide Person-Centered Care  Goal: Readiness for Transition of Care  Outcome: Ongoing, Progressing  Intervention: Mutually Develop Transition Plan     Problem: Fall Injury Risk  Goal: Absence of Fall and Fall-Related Injury  Outcome: Ongoing, Progressing  Intervention: Identify and Manage Contributors to Fall Injury Risk  Intervention: Promote Injury-Free Environment     Problem: Pain Chronic (Persistent) (Comorbidity Management)  Goal: Acceptable Pain Control and Functional Ability  Outcome: Ongoing, Progressing  Intervention: Manage Persistent Pain  Intervention: Optimize Psychosocial Wellbeing     Problem: Pain Chronic (Persistent) (Comorbidity Management)  Goal: Acceptable Pain Control and Functional Ability  Outcome: Ongoing, Progressing  Intervention: Manage Persistent Pain  Intervention: Optimize Psychosocial Wellbeing     Problem: Fall Injury Risk  Goal: Absence of Fall and Fall-Related Injury  Outcome: Ongoing, Progressing  Intervention: Identify and Manage Contributors to Fall Injury Risk  Intervention: Promote Injury-Free Environment   Goal Outcome Evaluation:  Plan of Care Reviewed With: patient        Progress: no change

## 2021-06-10 NOTE — DISCHARGE SUMMARY
History and physical/discharge summary     Chief complaint: Cirrhosis with hepatic failure     History of present illness: 75-year-old gentleman with history of hepatocellular carcinoma treated with radiation and now progressive cirrhosis with hepatic failure.  Currently being worked up for liver transplant at [facility].  Admitted due to progression of disease evidenced by worsening ascites and lab work.     Physical exam:  Alert and oriented  Lungs: Reasonably unlabored inspiratory effort at rest  Heart: Regular rate     Impression and plan/hospital course: 75-year-old gentleman admitted for progression of cirrhosis/hepatic failure.  He was seen by nephrology, gastroenterology, and infectious disease consultants.  Lab work at discharge included white count of 5.8, hemoglobin of 13.8, GFR of 62, bilirubin of 10.5, AST of 229, ALT of 165, and albumin of 2.4.  Paracentesis was performed with 6 L return.  Volume depletion was suspected and spironolactone was discontinued.  No infectious issue was noted.  Follow-up with  transplant center.

## 2021-06-10 NOTE — DISCHARGE PLACEMENT REQUEST
"Ede Ragsdale (75 y.o. Male)     Date of Birth Social Security Number Address Home Phone MRN    1945  [id number] [facility] [address] [phone] [id number]    [employer] Mar[employer] Status          Jainism        Admission Date Admission Type Admitting Provider Attending Provider Department, Room/Bed    6/9/21 Urgent Miguel Duran MD Pokorny, Richard, MD [address], P382/1    Discharge Date Discharge Disposition Discharge Destination                       Attending Provider: Miguel Duran MD    Allergies: No Known Allergies    Isolation: None   Infection: None   Code Status: Not on file    Ht: 170.2 cm (67.01\")   Wt: 86.2 kg (190 lb 0.6 oz)    Admission Cmt: None   Principal Problem: None                Active Insurance as of 6/9/2021     Primary Coverage     Payor Plan Insurance Group Employer/Plan Group    [employer] EMPLOYEE [id number]     Payor Plan Address Payor Plan Phone Number Payor Plan Fax Number Effective Dates    [address] [phone]  1/1/2018 - None Entered    [facility] [id number]       Subscriber Name Subscriber Birth Date Member ID       JNJAN 1/8/1956 [id number]           Secondary Coverage     Payor Plan Insurance Group Employer/Plan Group    MEDICARE MEDICARE A ONLY      Payor Plan Address Payor Plan Phone Number Payor Plan Fax Number Effective Dates    [address] [phone]  10/1/2011 - None Entered    [facility] [id number]       Subscriber Name Subscriber Birth Date Member ID       EDE RAGSDALE 1945 [id number]                 Emergency Contacts      (Rel.) Home Phone Work Phone Mobile Phone    Weston Ragsdale (Spouse) [phone] -- [phone]            Emergency Contact Information     Name Relation Home Work Mobile    Weston Ragsdale Spouse [phone]          Insurance Information                [employer] EMPLOYEE Phone: " [phone]    Subscriber: Weston Christian Subscriber#: [id number]    Group#: [id number] Precert#:         MEDICARE/MEDICARE A ONLY Phone: [phone]    Subscriber: Harris Christian Subscriber#: [id number]    Group#:  Precert#:

## 2021-06-10 NOTE — PROGRESS NOTES
LOS: 1 day     Chief Complaint:  Follow-up cirrhosis    Interval History:  No fever. No new symptoms today. S/P 6 L paracentesis yesterday. Cell counts not consistent with infection. Eager for discharge today.     ROS: fair appetite, no vomiting; easy skin tearing    Vital Signs  Temp:  [96.6 °F (35.9 °C)-97.6 °F (36.4 °C)] 96.6 °F (35.9 °C)  Heart Rate:  [61-88] 81  Resp:  [16-18] 16  BP: ()/(47-89) 77/47    Physical Exam:  General: awake, very nice, in chair  ENT: MMM  Cardiovascular: NR, + pitting BLE edema  Respiratory: normal work of breathing on ambient air  GI: Abdomen is distended though improved  :  no Spears catheter  Skin: skin tears on arms and legs but no heat, streaking erythema, or purulent drainage  Neurological: Alert and oriented x 3  Psychiatric: Normal mood and affect     Meds:    Current Facility-Administered Medications:   •  guaiFENesin-codeine (GUAIFENESIN AC) 100-10 MG/5ML liquid 5 mL, 5 mL, Oral, TID PRN, Miguel Duran MD  •  midodrine (PROAMATINE) tablet 5 mg, 5 mg, Oral, TID AC, Ken Martinez MD  •  sodium chloride 0.9 % flush 10 mL, 10 mL, Intravenous, Q12H, Miguel Duran MD, 10 mL at 06/10/21 0836  •  sodium chloride 0.9 % flush 10 mL, 10 mL, Intravenous, PRN, Miguel Duran MD  •  ursodiol (ACTIGALL) tablet 500 mg, 500 mg, Oral, BID, Veronica Good MD    LABS:  Micro reviewed today  Lab Results   Component Value Date    WBC 5.71 06/09/2021    HGB 12.9 (L) 06/09/2021    HCT 34.0 (L) 06/09/2021    MCV 95.8 06/09/2021     (L) 06/09/2021     Lab Results   Component Value Date    GLUCOSE 112 (H) 06/09/2021    BUN 43 (H) 06/09/2021    CREATININE 1.00 06/09/2021    EGFRIFNONA 73 06/09/2021    EGFRIFAFRI 100 06/11/2020    BCR 43.0 (H) 06/09/2021    CO2 28.5 06/09/2021    CALCIUM 9.2 06/09/2021    PROTENTOTREF 6.9 06/11/2020    ALBUMIN 2.60 (L) 06/09/2021    LABIL2 1.0 06/11/2020     (H) 06/09/2021     (H) 06/09/2021     Lab Results    Component Value Date    INR 1.50 (H) 06/09/2021    INR 1.53 (H) 06/08/2021    INR 1.0 05/28/2021    PROTIME 17.9 (H) 06/09/2021    PROTIME 18.2 (H) 06/08/2021    PROTIME 12.3 (L) 05/28/2021     [facility] Labs 1/29/21:  Hep A IgG positive  Hep B sAg negative  Hep B sAb negative  Hep C Ab negative     Viral Hepatitis Panel (3/2/20):  Hep A IgM negative  Hep B sAg negative  Hep B c IgM negative  Hep C Ab negative     5/6 Paracentesis:   Negative for malignant cells      Labs 5/3/21:  HIV Ab negative  Syphilis Ab negative  Crypto Ag negative  CMV IgG > 10  EBV IgG positive >750  VZV IgG positive  HSV 1 Ab positive  HSV 2 Ab negative     Paracentesis Labs 6/9/21:  122 Nucleated cells (15% PMNs)    ---------------------------------------------------------------------------------  Crypto Ag negative  UA 3-5 WBCs    Microbiology:  4/2 Wound Cx: scant growth Corynebacterium species  6/9 Paracentesis Cx: NGTD  6/9 BCx: NGTD  6/9 COVID: negative    Radiology (personally reviewed report):   Duplex Portal Hepatic System: normal flow in hepatic and portal veins    Assessment/Plan   1. Autoimmune hepatitis and decompensated cirrhosis  2. Hepatocellular carcinoma - treated  3. Hyponatremia  4. Hyperbilirubinemia  5. Acute kidney injury  6. Pre-transplant evaluation     Overnight he has been afebrile. He has a normal WBC. No new symptoms today. His paracentesis labs were not consistent with infection. I will ask the micro lab to hold the sample for 14 days to make sure nothing late-growing though I think this is unlikely. I have asked them to add on an anaerobic culture as well. Blood cultures are NGTD. No objection to discharge from an infection standpoint. I will follow-up his blood cultures are discharge.     He received his 1st dose of the Hep B vaccine yesterday. He will need another dose around 7/9 (one month) and 12/9 (6 months) to complete the series. I have asked CCP if this can be done through home health. If not, we will try  to find something convenient for him closer to home.     AM CMP pending. I will follow-up GI and renal evaluations today.

## 2021-06-11 NOTE — TELEPHONE ENCOUNTER
New order placed for weekly paracentesis.   Message sent to Dr Good to [facility].

## 2021-06-11 NOTE — TELEPHONE ENCOUNTER
----- Message from Veronica Good MD sent at 6/10/2021  4:07 PM EDT -----  3 please get this patient set up for weekly as needed outpatient paracenteses.  I have put in the order along with the fluid studies and albumin (cell count and diff, fluid culture, 12.5g albumin after) but I am not certain how to make sure that this is weekly.  Thanks!

## 2021-06-11 NOTE — TELEPHONE ENCOUNTER
Order signed .  Called pt and left  for pt to call back.     Called pt's spouse and advised that we have ordered the weekly paracentesis and she can call [facility] at [phone] to get them arranged. Pt's wife verb understanding.

## 2021-06-11 NOTE — OUTREACH NOTE
Prep Survey      Responses   [facility] facility patient discharged from?  [facility]   Is LACE score < 7 ?  No   Emergency Room discharge w/ pulse ox?  No   Eligibility  Readm Mgmt   Discharge diagnosis   Cirrhosis with hepatic failureascites    Does the patient have one of the following disease processes/diagnoses(primary or secondary)?  Other   Does the patient have Home health ordered?  Yes   What is the Home health agency?   [facility]   Is there a DME ordered?  No   Comments regarding appointments  Follow-up with [facility].   Prep survey completed?  Yes          Savi Madrigal RN

## 2021-06-11 NOTE — TELEPHONE ENCOUNTER
Called [facility] scheduling at [phone] and was on hold for 15 min without reaching someone and was placed in vm.    left advising we are calling regarding a paracentesis and we need to speak with someone .  Call back number with ext left.

## 2021-06-14 NOTE — PROGRESS NOTES
06/15/21 0000   Pre-Procedure Phone Call   Procedure Time Verified Yes   Arrival Time 1230   Procedure Location Verified Yes   Medical History Reviewed No   NPO Status Reinforced Yes   Ride and Caregiver Arranged Yes   Patient Knows to Bring Current Medications No   Bring Outside Films Requested No

## 2021-06-15 NOTE — NURSING NOTE
Patient out to car via wheelchair and NA assist. Wife is driving patient. No issues or concerns noted.

## 2021-06-15 NOTE — NURSING NOTE
Pt arrived to Radiology triage [place] [address] for US Paracentesis.   Pt wearing a mask as well as this RN for any bedside care.

## 2021-06-15 NOTE — DISCHARGE INSTRUCTIONS
EDUCATION /DISCHARGE INSTRUCTIONS  Paracentesis:  A needle is inserted into the space between your abdominal organs and the membrane that surrounds them (peritoneal space).  It is done for the diagnosis and treatment of fluid that is resistant to other therapies.  It helps determine the cause of the fluid and at the relieves pressure created by the fluid.  A sample is obtained and sent to the laboratory for study.  During the procedure:  You will lie on a bed on your back with your legs drawn up.  Your abdomen will be exposed from the chest to the pelvis.  You will otherwise be covered to maintain comfort.  A physician will clean your abdomen with antiseptic soap, place a sterile towel around the site and administer a local anesthetic to numb the area.  The physician will insert a needle into your abdominal wall.  There may be a popping sound which signifies the needle has pierced the abdominal wall. Next, the physician will attach tubing to transfer a sample into a collection bottle.  After the fluid is obtained the needle will be removed.  A pressure dressing is applied to the site.  Risks of the procedure include but are not limited to:   *  Bleeding    *  Wound infection   *  Low blood pressure   *  Decreased urination   *  Low sodium if a large amount of fluid is removed   *  Puncture of abdominal organs by the needle    Benefits of the procedure:  Benefits include the removal of fluid from the abdomen, relief of abdominal pressure and facilitation of a diagnosis.  Alternatives to the procedure:  Possible alternatives are diuretic drug therapy or surgery to place a shunt to drain fluid.  Risks of diuretic drug therapy include possible dehydration and renal failure.  The benefit of drug therapy is that it can be done at home under physician supervision.  Risks of shunt placement include exposure to anesthesia, infection, excessive bleeding and injury to abdominal organs.  The benefit of a shunt is that it can be  used to drain fluid over a longer period of time.  THIS EDUCATION INFORMATION WAS REVIEWED PRIOR TO THE PROCEDURE AND CONSENT. Patient initials__________________Time____1315_____________    Post procedure: (Follow all the instructions below carefully)   *  Weigh yourself daily.   *  Follow your doctors dietary instructions.   *  Rest today (no pushing pulling, straining or heavy lifting).   *  Slowly increase activity tomorow.   *  If you received sedation do not drive for 24 hours.              * Skin affix  applied to puncture site. Do not try to remove, scratch or apply lotion   * Skin affix will fall off on it's own   *  You may shower tomorrow  Call your doctor if experiencing:   *  Signs of infection such as redness, swelling, excessive pain and / or foul       smelling drainage from the puncture site.   *  Chills or fever over 101 degrees (by mouth).   *  Fainting or any noted Rapid weight gain/loss   *  Unrelieved pain or any new or severe symptoms  Following the procedure:      Follow-up with the ordering physician as directed.   Continue to take other medications as directed by your physician unless    otherwise instructed.   If applicable, resume taking your blood thinners or Aspirin in 24 hours.              If you have any concerns please call the Radiology Nurses Desk at [phone]

## 2021-06-17 NOTE — OUTREACH NOTE
"Medical Week 1 Survey      Responses   [facility] patient discharged from?  [place]   Does the patient have one of the following disease processes/diagnoses(primary or secondary)?  Other   Week 1 attempt successful?  Yes   Call start time  1503   Revoke  Decline to participate [Wife answered the phone. She said, \"he's dying and I don't care to talk about it.\"]   Call end time  1504   Discharge diagnosis  Cirrhosis with hepatic failure,  ascites           Nickie Saab RN  "

## 2021-06-17 NOTE — HOME HEALTH
"Patient has multiple skin tears/\"ulcerations\" to his left upper arm, right forearm and right lower leg. Patient's spouse is treating with over the counter bandaids and stated that she would be treating skin breakdown herself. Will continue to assess and treat as allowed"

## 2021-06-20 LAB
BACTERIA FLD CULT: NORMAL
GRAM STN SPEC: NORMAL

## 2021-06-23 LAB
BACTERIA FLD CULT: NO GROWTH
GRAM STN SPEC: NORMAL

## 2021-06-24 LAB — BACTERIA SPEC ANAEROBE CULT: NORMAL

## (undated) DEVICE — KT ORCA ORCAPOD DISP STRL

## (undated) DEVICE — GLV SURG BIOGEL LTX PF 6

## (undated) DEVICE — SUT VIC 3/0 TIES 18IN J110T

## (undated) DEVICE — LN SMPL CO2 SHTRM SD STREAM W/M LUER

## (undated) DEVICE — TRAP FLD MINIVAC MEGADYNE 100ML

## (undated) DEVICE — CANN O2 ETCO2 FITS ALL CONN CO2 SMPL A/ 7IN DISP LF

## (undated) DEVICE — PENROSE DRAIN 18" X 5/8": Brand: CARDINAL HEALTH

## (undated) DEVICE — SENSR O2 OXIMAX FNGR A/ 18IN NONSTR

## (undated) DEVICE — ELECTRD BLD EZ CLN MOD XLNG 2.75IN

## (undated) DEVICE — SUT ETHIB 0/0 MO6 I8IN CX45D

## (undated) DEVICE — TUBING, SUCTION, 1/4" X 10', STRAIGHT: Brand: MEDLINE

## (undated) DEVICE — ANTIBACTERIAL UNDYED BRAIDED (POLYGLACTIN 910), SYNTHETIC ABSORBABLE SUTURE: Brand: COATED VICRYL

## (undated) DEVICE — BITEBLOCK OMNI BLOC

## (undated) DEVICE — GLV SURG PREMIERPRO ORTHO LTX PF SZ7.5 BRN

## (undated) DEVICE — SUT VIC 5/0 PS2 18IN J495H

## (undated) DEVICE — LOU MINOR PROCEDURE: Brand: MEDLINE INDUSTRIES, INC.

## (undated) DEVICE — PENCL E/S ULTRAVAC TELESCP NOSE HOLSTR 10FT

## (undated) DEVICE — ADAPT CLN BIOGUARD AIR/H2O DISP

## (undated) DEVICE — GOWN ,SIRUS,NONREINFORCED SMALL: Brand: MEDLINE

## (undated) DEVICE — SKIN PREP TRAY W/CHG: Brand: MEDLINE INDUSTRIES, INC.